# Patient Record
Sex: FEMALE | Race: WHITE | NOT HISPANIC OR LATINO | URBAN - METROPOLITAN AREA
[De-identification: names, ages, dates, MRNs, and addresses within clinical notes are randomized per-mention and may not be internally consistent; named-entity substitution may affect disease eponyms.]

---

## 2023-03-01 PROBLEM — M54.50 LOW BACK PAIN: Status: ACTIVE | Noted: 2023-03-01

## 2023-03-01 PROBLEM — R35.0 URINE FREQUENCY: Status: ACTIVE | Noted: 2023-03-01

## 2023-03-01 PROBLEM — B37.31 CANDIDIASIS OF VAGINA: Status: ACTIVE | Noted: 2023-03-01

## 2023-03-01 PROBLEM — K80.20 CHOLELITHIASIS: Status: ACTIVE | Noted: 2023-03-01

## 2023-03-01 PROBLEM — R74.8 ELEVATED CPK: Status: ACTIVE | Noted: 2023-03-01

## 2023-03-01 PROBLEM — B36.9 FUNGAL RASH OF TRUNK: Status: ACTIVE | Noted: 2023-03-01

## 2023-03-01 PROBLEM — M54.9 BACK PAIN WITH RADIATION: Status: ACTIVE | Noted: 2023-03-01

## 2023-03-01 PROBLEM — D25.9 UTERINE FIBROID: Status: ACTIVE | Noted: 2023-03-01

## 2023-03-01 PROBLEM — E55.9 VITAMIN D DEFICIENCY: Status: ACTIVE | Noted: 2023-03-01

## 2023-03-01 PROBLEM — E03.9 HYPOTHYROIDISM: Status: ACTIVE | Noted: 2023-03-01

## 2023-03-01 PROBLEM — R06.02 SOB (SHORTNESS OF BREATH) ON EXERTION: Status: ACTIVE | Noted: 2023-03-01

## 2023-03-01 PROBLEM — J11.1 INFLUENZA: Status: ACTIVE | Noted: 2023-03-01

## 2023-03-01 PROBLEM — L30.9 DERMATITIS, ECZEMATOID: Status: ACTIVE | Noted: 2023-03-01

## 2023-03-01 PROBLEM — M25.552 HIP PAIN, BILATERAL: Status: ACTIVE | Noted: 2023-03-01

## 2023-03-01 PROBLEM — M89.9 DISORDER OF BONE AND ARTICULAR CARTILAGE: Status: ACTIVE | Noted: 2023-03-01

## 2023-03-01 PROBLEM — M79.10 MYALGIA: Status: ACTIVE | Noted: 2023-03-01

## 2023-03-01 PROBLEM — M94.9 DISORDER OF BONE AND ARTICULAR CARTILAGE: Status: ACTIVE | Noted: 2023-03-01

## 2023-03-01 PROBLEM — R05.9 COUGH: Status: ACTIVE | Noted: 2023-03-01

## 2023-03-01 PROBLEM — R73.9 HYPERGLYCEMIA: Status: ACTIVE | Noted: 2023-03-01

## 2023-03-01 PROBLEM — M54.2 NECK PAIN: Status: ACTIVE | Noted: 2023-03-01

## 2023-03-01 PROBLEM — R06.83 SNORING: Status: ACTIVE | Noted: 2023-03-01

## 2023-03-01 PROBLEM — J32.9 SINUSITIS: Status: ACTIVE | Noted: 2023-03-01

## 2023-03-01 PROBLEM — R53.83 FATIGUE: Status: ACTIVE | Noted: 2023-03-01

## 2023-03-01 PROBLEM — E66.811 CLASS 1 OBESITY WITH BODY MASS INDEX (BMI) OF 30.0 TO 30.9 IN ADULT: Status: ACTIVE | Noted: 2023-03-01

## 2023-03-01 PROBLEM — R42 DIZZINESS: Status: ACTIVE | Noted: 2023-03-01

## 2023-03-01 PROBLEM — R10.9 ABDOMINAL PAIN: Status: ACTIVE | Noted: 2023-03-01

## 2023-03-01 PROBLEM — N95.2 ATROPHY OF VAGINA: Status: ACTIVE | Noted: 2023-03-01

## 2023-03-01 PROBLEM — M25.551 HIP PAIN, BILATERAL: Status: ACTIVE | Noted: 2023-03-01

## 2023-03-01 PROBLEM — E78.5 HYPERLIPIDEMIA: Status: ACTIVE | Noted: 2023-03-01

## 2023-03-01 PROBLEM — E07.9 THYROID LUMP: Status: ACTIVE | Noted: 2023-03-01

## 2023-03-01 PROBLEM — M79.602 PAIN OF LEFT UPPER EXTREMITY: Status: ACTIVE | Noted: 2023-03-01

## 2023-03-01 PROBLEM — E66.9 CLASS 1 OBESITY WITH BODY MASS INDEX (BMI) OF 30.0 TO 30.9 IN ADULT: Status: ACTIVE | Noted: 2023-03-01

## 2023-03-01 PROBLEM — M54.9 BACK PAIN: Status: ACTIVE | Noted: 2023-03-01

## 2023-03-01 PROBLEM — E04.1 THYROID NODULE: Status: ACTIVE | Noted: 2023-03-01

## 2023-03-01 PROBLEM — R09.81 SINUS CONGESTION: Status: ACTIVE | Noted: 2023-03-01

## 2023-03-01 PROBLEM — R68.89 FLU-LIKE SYMPTOMS: Status: ACTIVE | Noted: 2023-03-01

## 2023-03-01 PROBLEM — R10.2 PELVIC PAIN IN FEMALE: Status: ACTIVE | Noted: 2023-03-01

## 2023-03-01 PROBLEM — N39.0 UTI (URINARY TRACT INFECTION): Status: ACTIVE | Noted: 2023-03-01

## 2023-03-01 PROBLEM — J45.909 ASTHMA (HHS-HCC): Status: ACTIVE | Noted: 2023-03-01

## 2023-03-01 PROBLEM — I10 HYPERTENSION: Status: ACTIVE | Noted: 2023-03-01

## 2023-03-01 PROBLEM — J30.9 ALLERGIC RHINITIS: Status: ACTIVE | Noted: 2023-03-01

## 2023-03-01 PROBLEM — U07.1 COVID-19 VIRUS INFECTION: Status: ACTIVE | Noted: 2023-03-01

## 2023-03-01 PROBLEM — B37.0 ORAL THRUSH: Status: ACTIVE | Noted: 2023-03-01

## 2023-03-01 PROBLEM — R07.9 CHEST PAIN: Status: ACTIVE | Noted: 2023-03-01

## 2023-03-01 PROBLEM — E78.00 PURE HYPERCHOLESTEROLEMIA: Status: ACTIVE | Noted: 2023-03-01

## 2023-03-01 PROBLEM — R31.9 HEMATURIA: Status: ACTIVE | Noted: 2023-03-01

## 2023-03-01 PROBLEM — E87.1 HYPONATREMIA: Status: ACTIVE | Noted: 2023-03-01

## 2023-03-01 PROBLEM — R82.998 DARK BROWN-COLORED URINE: Status: ACTIVE | Noted: 2023-03-01

## 2023-03-01 PROBLEM — L85.3 DRY SKIN: Status: ACTIVE | Noted: 2023-03-01

## 2023-03-01 RX ORDER — DESOXIMETASONE 2.5 MG/G
CREAM TOPICAL
COMMUNITY
Start: 2017-12-05 | End: 2023-06-23 | Stop reason: ALTCHOICE

## 2023-03-01 RX ORDER — ALBUTEROL SULFATE 0.83 MG/ML
SOLUTION RESPIRATORY (INHALATION)
COMMUNITY
Start: 2022-03-11 | End: 2023-11-13 | Stop reason: SDUPTHER

## 2023-03-01 RX ORDER — BUDESONIDE AND FORMOTEROL FUMARATE DIHYDRATE 160; 4.5 UG/1; UG/1
2 AEROSOL RESPIRATORY (INHALATION) 2 TIMES DAILY
COMMUNITY
Start: 2022-03-25 | End: 2023-11-13 | Stop reason: SDUPTHER

## 2023-03-01 RX ORDER — LEVOCETIRIZINE DIHYDROCHLORIDE 5 MG/1
1 TABLET, FILM COATED ORAL DAILY
COMMUNITY
Start: 2016-04-18 | End: 2023-05-16

## 2023-03-01 RX ORDER — OLMESARTAN MEDOXOMIL 40 MG/1
1 TABLET ORAL DAILY
COMMUNITY
Start: 2020-10-23 | End: 2023-03-22 | Stop reason: SDUPTHER

## 2023-03-01 RX ORDER — FLUTICASONE PROPIONATE 50 MCG
1 SPRAY, SUSPENSION (ML) NASAL DAILY
COMMUNITY
Start: 2020-09-29 | End: 2023-11-13 | Stop reason: SDUPTHER

## 2023-03-01 RX ORDER — NYSTATIN 100000 U/G
CREAM TOPICAL
COMMUNITY
Start: 2021-07-19 | End: 2023-06-23 | Stop reason: ALTCHOICE

## 2023-03-01 RX ORDER — ALBUTEROL SULFATE 90 UG/1
2 AEROSOL, METERED RESPIRATORY (INHALATION) 2 TIMES DAILY PRN
COMMUNITY
Start: 2022-03-11 | End: 2023-03-28

## 2023-03-01 RX ORDER — LEVOTHYROXINE SODIUM 100 UG/1
1 TABLET ORAL DAILY
COMMUNITY
End: 2023-11-13 | Stop reason: DRUGHIGH

## 2023-03-02 LAB
ALANINE AMINOTRANSFERASE (SGPT) (U/L) IN SER/PLAS: 33 U/L (ref 7–45)
ALBUMIN (G/DL) IN SER/PLAS: 4.4 G/DL (ref 3.4–5)
ALKALINE PHOSPHATASE (U/L) IN SER/PLAS: 87 U/L (ref 33–136)
ANION GAP IN SER/PLAS: 12 MMOL/L (ref 10–20)
ASPARTATE AMINOTRANSFERASE (SGOT) (U/L) IN SER/PLAS: 25 U/L (ref 9–39)
BILIRUBIN TOTAL (MG/DL) IN SER/PLAS: 0.9 MG/DL (ref 0–1.2)
CALCIUM (MG/DL) IN SER/PLAS: 10.4 MG/DL (ref 8.6–10.6)
CARBON DIOXIDE, TOTAL (MMOL/L) IN SER/PLAS: 29 MMOL/L (ref 21–32)
CHLORIDE (MMOL/L) IN SER/PLAS: 99 MMOL/L (ref 98–107)
CHOLESTEROL (MG/DL) IN SER/PLAS: 266 MG/DL (ref 0–199)
CHOLESTEROL IN HDL (MG/DL) IN SER/PLAS: 67.6 MG/DL
CHOLESTEROL/HDL RATIO: 3.9
CREATININE (MG/DL) IN SER/PLAS: 0.71 MG/DL (ref 0.5–1.05)
ERYTHROCYTE DISTRIBUTION WIDTH (RATIO) BY AUTOMATED COUNT: 12.6 % (ref 11.5–14.5)
ERYTHROCYTE MEAN CORPUSCULAR HEMOGLOBIN CONCENTRATION (G/DL) BY AUTOMATED: 32.9 G/DL (ref 32–36)
ERYTHROCYTE MEAN CORPUSCULAR VOLUME (FL) BY AUTOMATED COUNT: 89 FL (ref 80–100)
ERYTHROCYTES (10*6/UL) IN BLOOD BY AUTOMATED COUNT: 4.71 X10E12/L (ref 4–5.2)
GFR FEMALE: >90 ML/MIN/1.73M2
GLUCOSE (MG/DL) IN SER/PLAS: 96 MG/DL (ref 74–99)
HEMATOCRIT (%) IN BLOOD BY AUTOMATED COUNT: 42 % (ref 36–46)
HEMOGLOBIN (G/DL) IN BLOOD: 13.8 G/DL (ref 12–16)
LDL: 177 MG/DL (ref 0–99)
LEUKOCYTES (10*3/UL) IN BLOOD BY AUTOMATED COUNT: 4.1 X10E9/L (ref 4.4–11.3)
NRBC (PER 100 WBCS) BY AUTOMATED COUNT: 0 /100 WBC (ref 0–0)
PLATELETS (10*3/UL) IN BLOOD AUTOMATED COUNT: 293 X10E9/L (ref 150–450)
POTASSIUM (MMOL/L) IN SER/PLAS: 4.3 MMOL/L (ref 3.5–5.3)
PROTEIN TOTAL: 7 G/DL (ref 6.4–8.2)
SODIUM (MMOL/L) IN SER/PLAS: 136 MMOL/L (ref 136–145)
THYROTROPIN (MIU/L) IN SER/PLAS BY DETECTION LIMIT <= 0.05 MIU/L: 6.07 MIU/L (ref 0.44–3.98)
TRIGLYCERIDE (MG/DL) IN SER/PLAS: 108 MG/DL (ref 0–149)
UREA NITROGEN (MG/DL) IN SER/PLAS: 10 MG/DL (ref 6–23)
VLDL: 22 MG/DL (ref 0–40)

## 2023-03-06 ENCOUNTER — OFFICE VISIT (OUTPATIENT)
Dept: PRIMARY CARE | Facility: CLINIC | Age: 68
End: 2023-03-06
Payer: COMMERCIAL

## 2023-03-06 VITALS
HEIGHT: 62 IN | DIASTOLIC BLOOD PRESSURE: 60 MMHG | BODY MASS INDEX: 31.47 KG/M2 | SYSTOLIC BLOOD PRESSURE: 122 MMHG | WEIGHT: 171 LBS

## 2023-03-06 DIAGNOSIS — J30.89 SEASONAL ALLERGIC RHINITIS DUE TO OTHER ALLERGIC TRIGGER: ICD-10-CM

## 2023-03-06 DIAGNOSIS — I15.9 SECONDARY HYPERTENSION: Primary | ICD-10-CM

## 2023-03-06 DIAGNOSIS — J45.20 MILD INTERMITTENT ASTHMA, UNSPECIFIED WHETHER COMPLICATED (HHS-HCC): ICD-10-CM

## 2023-03-06 DIAGNOSIS — Z00.00 HEALTHCARE MAINTENANCE: ICD-10-CM

## 2023-03-06 DIAGNOSIS — E78.2 MIXED HYPERLIPIDEMIA: ICD-10-CM

## 2023-03-06 DIAGNOSIS — E03.9 HYPOTHYROIDISM, UNSPECIFIED TYPE: ICD-10-CM

## 2023-03-06 PROCEDURE — 3078F DIAST BP <80 MM HG: CPT | Performed by: INTERNAL MEDICINE

## 2023-03-06 PROCEDURE — 3074F SYST BP LT 130 MM HG: CPT | Performed by: INTERNAL MEDICINE

## 2023-03-06 PROCEDURE — 99214 OFFICE O/P EST MOD 30 MIN: CPT | Performed by: INTERNAL MEDICINE

## 2023-03-06 PROCEDURE — 1159F MED LIST DOCD IN RCRD: CPT | Performed by: INTERNAL MEDICINE

## 2023-03-06 RX ORDER — FLUTICASONE PROPIONATE 50 MCG
2 SPRAY, SUSPENSION (ML) NASAL DAILY
Qty: 16 G | Refills: 2 | Status: SHIPPED | OUTPATIENT
Start: 2023-03-06 | End: 2023-06-23 | Stop reason: ALTCHOICE

## 2023-03-06 RX ORDER — LEVOTHYROXINE SODIUM 100 UG/1
100 TABLET ORAL DAILY
Qty: 90 TABLET | Refills: 1 | Status: SHIPPED | OUTPATIENT
Start: 2023-03-06 | End: 2023-06-23 | Stop reason: ALTCHOICE

## 2023-03-06 NOTE — PROGRESS NOTES
"Subjective   Patient ID: Ade Fisher is a 67 y.o. female who presents for Follow-up (Results) and Earache.    Earache      Follow-up on hypertension high cholesterol hypothyroidism  Did blood work  She has to go off the statins because it gave her severe muscle pain,  Wants refill on allergy medications  Needs refill on eczema cream  Her asthma is flaring up as she moved to new house and lot of work of remodeling going on refill of nebulizer solution  She feels her ears stuffy    Past recap   Patient presents with complaints of urinary symptoms having some frequency dark urine and back pain over the weekend  She had colonoscopy done couple of months ago by Dr. Chris diagnosed with diverticular disease     She did not do sleep study     Follow-up on hypertension high cholesterol hypothyroidism  Needs medication refill  Did blood work  She feels very tired because she does not get enough sleep  She does snore but does not think she has sleep apnea did not do sleep study  She is off the statins could not tolerate the side effects  She was on vacation and was not watching her diet     Nonsmoker nondrinker  Family history mother  of lung cancer father  of colon cancer at 85 grandmother had stroke    Review of Systems   HENT:  Positive for ear pain.        Objective   /60   Ht 1.575 m (5' 2\")   Wt 77.6 kg (171 lb)   BMI 31.28 kg/m²     Physical Exam  Vitals reviewed.   Constitutional:       Appearance: Normal appearance.   HENT:      Head: Normocephalic and atraumatic.      Right Ear: Tympanic membrane, ear canal and external ear normal.      Left Ear: Tympanic membrane, ear canal and external ear normal.      Nose: Nose normal.      Mouth/Throat:      Pharynx: Oropharynx is clear.   Eyes:      Extraocular Movements: Extraocular movements intact.      Conjunctiva/sclera: Conjunctivae normal.      Pupils: Pupils are equal, round, and reactive to light.   Cardiovascular:      Rate and Rhythm: Normal " rate and regular rhythm.      Pulses: Normal pulses.      Heart sounds: Normal heart sounds.   Pulmonary:      Effort: Pulmonary effort is normal.      Breath sounds: Normal breath sounds.   Abdominal:      General: Abdomen is flat. Bowel sounds are normal.      Palpations: Abdomen is soft.   Musculoskeletal:      Cervical back: Normal range of motion and neck supple.   Skin:     General: Skin is warm and dry.   Neurological:      General: No focal deficit present.      Mental Status: She is alert and oriented to person, place, and time.   Psychiatric:         Mood and Affect: Mood normal.         Assessment/Plan   Problem List Items Addressed This Visit          Respiratory    Asthma       Circulatory    Hypertension - Primary       Endocrine/Metabolic    Hypothyroidism       Other    Allergic rhinitis     Other Visit Diagnoses       Healthcare maintenance              Past recap  Blood pressure is stable  Will decrease levothyroxine 100 mcg  Ultrasound thyroid for the thyroid nodule yearly follow-up  Cholesterol is extremely high but patient does not want to take medications  She understands risks  She is cannot do it with diet and exercise  For hematuria she saw the urologist cystoscopy normal  Patient thinks that years she was going through a lot of stress and anxiety could have been contributing to a lot of her symptoms  Follow-up blood work in 3 months    3/6/23  Patient tympanic membrane clear  She has some tenderness in the TMJ  Flonase nasal spray to help with the eustachian tube dysfunction  Blood work reviewed  TSH little elevated I think patient was missing few dosages which she accepted  We will continue levothyroxine 100 mcg  Patient is again refusing to take statins for high cholesterol  We will do CT cardiac scoring to assess the risk  Follow-up blood work in 6 months

## 2023-03-22 DIAGNOSIS — I10 HYPERTENSION, UNSPECIFIED TYPE: Primary | ICD-10-CM

## 2023-03-23 RX ORDER — OLMESARTAN MEDOXOMIL 40 MG/1
40 TABLET ORAL DAILY
Qty: 90 TABLET | Refills: 0 | Status: SHIPPED | OUTPATIENT
Start: 2023-03-23 | End: 2023-06-05

## 2023-03-24 DIAGNOSIS — Z00.00 HEALTHCARE MAINTENANCE: ICD-10-CM

## 2023-03-27 DIAGNOSIS — J45.20 MILD INTERMITTENT ASTHMA, UNSPECIFIED WHETHER COMPLICATED (HHS-HCC): Primary | ICD-10-CM

## 2023-03-28 RX ORDER — ALBUTEROL SULFATE 90 UG/1
AEROSOL, METERED RESPIRATORY (INHALATION)
Qty: 8.5 G | Refills: 0 | Status: SHIPPED | OUTPATIENT
Start: 2023-03-28 | End: 2023-06-23 | Stop reason: ALTCHOICE

## 2023-05-15 DIAGNOSIS — J30.89 ALLERGIC RHINITIS DUE TO OTHER ALLERGIC TRIGGER, UNSPECIFIED SEASONALITY: Primary | ICD-10-CM

## 2023-05-16 RX ORDER — LEVOCETIRIZINE DIHYDROCHLORIDE 5 MG/1
TABLET, FILM COATED ORAL
Qty: 90 TABLET | Refills: 1 | Status: SHIPPED | OUTPATIENT
Start: 2023-05-16 | End: 2023-11-10

## 2023-05-25 ENCOUNTER — TELEMEDICINE (OUTPATIENT)
Dept: PRIMARY CARE | Facility: CLINIC | Age: 68
End: 2023-05-25
Payer: COMMERCIAL

## 2023-05-25 VITALS — HEIGHT: 62 IN | BODY MASS INDEX: 31.65 KG/M2 | WEIGHT: 172 LBS

## 2023-05-25 DIAGNOSIS — R09.81 SINUS CONGESTION: ICD-10-CM

## 2023-05-25 PROBLEM — J20.9 ACUTE BRONCHITIS: Status: ACTIVE | Noted: 2023-05-25

## 2023-05-25 PROBLEM — J01.90 ACUTE SINUSITIS: Status: ACTIVE | Noted: 2023-05-25

## 2023-05-25 PROBLEM — R10.2 PAIN IN FEMALE PELVIS: Status: ACTIVE | Noted: 2023-05-25

## 2023-05-25 PROCEDURE — 99213 OFFICE O/P EST LOW 20 MIN: CPT | Performed by: INTERNAL MEDICINE

## 2023-05-25 RX ORDER — AZITHROMYCIN 250 MG/1
TABLET, FILM COATED ORAL
Qty: 6 TABLET | Refills: 0 | Status: SHIPPED | OUTPATIENT
Start: 2023-05-25 | End: 2023-05-30

## 2023-05-25 RX ORDER — DOXYCYCLINE 100 MG/1
100 CAPSULE ORAL 2 TIMES DAILY
Qty: 20 CAPSULE | Refills: 0 | COMMUNITY
Start: 2023-05-19 | End: 2023-05-29

## 2023-06-05 DIAGNOSIS — I10 HYPERTENSION, UNSPECIFIED TYPE: ICD-10-CM

## 2023-06-05 RX ORDER — OLMESARTAN MEDOXOMIL 40 MG/1
TABLET ORAL
Qty: 90 TABLET | Refills: 0 | Status: SHIPPED | OUTPATIENT
Start: 2023-06-05 | End: 2023-09-05

## 2023-06-07 ENCOUNTER — TELEMEDICINE (OUTPATIENT)
Dept: PRIMARY CARE | Facility: CLINIC | Age: 68
End: 2023-06-07
Payer: COMMERCIAL

## 2023-06-07 VITALS — BODY MASS INDEX: 31.65 KG/M2 | WEIGHT: 172 LBS | HEIGHT: 62 IN

## 2023-06-07 DIAGNOSIS — R09.81 SINUS CONGESTION: ICD-10-CM

## 2023-06-07 PROCEDURE — 99213 OFFICE O/P EST LOW 20 MIN: CPT | Performed by: INTERNAL MEDICINE

## 2023-06-07 RX ORDER — ALBUTEROL SULFATE 90 UG/1
2 AEROSOL, METERED RESPIRATORY (INHALATION) EVERY 4 HOURS PRN
Qty: 8.5 G | Refills: 0 | Status: SHIPPED | OUTPATIENT
Start: 2023-06-07 | End: 2023-07-10

## 2023-06-07 RX ORDER — AZITHROMYCIN 500 MG/1
500 TABLET, FILM COATED ORAL DAILY
Qty: 10 TABLET | Refills: 0 | Status: SHIPPED | OUTPATIENT
Start: 2023-06-07 | End: 2023-06-23 | Stop reason: ALTCHOICE

## 2023-06-07 RX ORDER — FLUTICASONE PROPIONATE 50 MCG
1 SPRAY, SUSPENSION (ML) NASAL DAILY
Qty: 16 G | Refills: 11 | Status: SHIPPED | OUTPATIENT
Start: 2023-06-07 | End: 2023-10-17 | Stop reason: ALTCHOICE

## 2023-06-07 NOTE — PROGRESS NOTES
"Subjective   Patient ID: Ade Fisher is a 67 y.o. female who presents for virtual visit (Sinus problems).    HPI   Patient complaining of sinus congestion postnasal drainage  She had 2 rounds of antibiotics since May 25.  Treated with doxycycline and Z-Jez.  She could not handle doxycycline  She is again having headaches dripping in the post sinus no chills no fever         Past recap  Follow-up on hypertension high cholesterol hypothyroidism  Did blood work  She has to go off the statins because it gave her severe muscle pain,  Wants refill on allergy medications  Needs refill on eczema cream  Her asthma is flaring up as she moved to new house and lot of work of remodeling going on refill of nebulizer solution  She feels her ears stuffy     Past recap   Patient presents with complaints of urinary symptoms having some frequency dark urine and back pain over the weekend  She had colonoscopy done couple of months ago by Dr. Chris diagnosed with diverticular disease     She did not do sleep study     Follow-up on hypertension high cholesterol hypothyroidism  Needs medication refill  Did blood work  She feels very tired because she does not get enough sleep  She does snore but does not think she has sleep apnea did not do sleep study  She is off the statins could not tolerate the side effects  She was on vacation and was not watching her diet     Nonsmoker nondrinker  Family history mother  of lung cancer father  of colon cancer at 85 grandmother had stroke     Review of Systems    Objective   Ht 1.575 m (5' 2\")   Wt 78 kg (172 lb)   BMI 31.46 kg/m²     Physical Exam    Assessment/Plan   Problem List Items Addressed This Visit          Other    Sinus congestion    Relevant Medications    azithromycin (Zithromax) 500 mg tablet    albuterol (ProAir HFA) 90 mcg/actuation inhaler    fluticasone (Flonase) 50 mcg/actuation nasal spray        Past recap  Blood pressure is stable  Will decrease levothyroxine 100 " mcg  Ultrasound thyroid for the thyroid nodule yearly follow-up  Cholesterol is extremely high but patient does not want to take medications  She understands risks  She is cannot do it with diet and exercise  For hematuria she saw the urologist cystoscopy normal  Patient thinks that years she was going through a lot of stress and anxiety could have been contributing to a lot of her symptoms  Follow-up blood work in 3 months     3/6/23  Patient tympanic membrane clear  She has some tenderness in the TMJ  Flonase nasal spray to help with the eustachian tube dysfunction  Blood work reviewed  TSH little elevated I think patient was missing few dosages which she accepted  We will continue levothyroxine 100 mcg  Patient is again refusing to take statins for high cholesterol  We will do CT cardiac scoring to assess the risk  Follow-up blood work in 6 months    6/7/2023  Treat with Zithromax for 10 days  Flonase nasal spray  Albuterol inhaler  Plenty of fluids  Follow-up if not better  I am wondering if patient has underlying allergies causing the symptoms  Advised to follow-up if not better

## 2023-06-11 NOTE — PROGRESS NOTES
"Subjective   Patient ID: Ade Fisher is a 67 y.o. female who presents for virtual visit (Sinus congestion).    HPI   Patient is here with complaints of having sinus congestion postnasal drainage sore throat headache runny nose taking allergy medication and Flonase not helping went to urgent care prescribed doxycycline made her stomach upset and having diarrhea     Past recap   follow-up on hypertension high cholesterol hypothyroidism  Did blood work  She has to go off the statins because it gave her severe muscle pain,  Wants refill on allergy medications  Needs refill on eczema cream  Her asthma is flaring up as she moved to new house and lot of work of remodeling going on refill of nebulizer solution  She feels her ears stuffy     Past recap   Patient presents with complaints of urinary symptoms having some frequency dark urine and back pain over the weekend  She had colonoscopy done couple of months ago by Dr. Chris diagnosed with diverticular disease     She did not do sleep study     Follow-up on hypertension high cholesterol hypothyroidism  Needs medication refill  Did blood work  She feels very tired because she does not get enough sleep  She does snore but does not think she has sleep apnea did not do sleep study  She is off the statins could not tolerate the side effects  She was on vacation and was not watching her diet     Nonsmoker nondrinker  Family history mother  of lung cancer father  of colon cancer at 85 grandmother had stroke  Review of Systems    Objective   Ht 1.575 m (5' 2\")   Wt 78 kg (172 lb)   BMI 31.46 kg/m²     Physical Exam  On virtual examination patient sounded congested in the sinuses  Assessment/Plan   Problem List Items Addressed This Visit          Other    Sinus congestion     Past recap  Blood pressure is stable  Will decrease levothyroxine 100 mcg  Ultrasound thyroid for the thyroid nodule yearly follow-up  Cholesterol is extremely high but patient does not want to " take medications  She understands risks  She is cannot do it with diet and exercise  For hematuria she saw the urologist cystoscopy normal  Patient thinks that years she was going through a lot of stress and anxiety could have been contributing to a lot of her symptoms  Follow-up blood work in 3 months     3/6/23  Patient tympanic membrane clear  She has some tenderness in the TMJ  Flonase nasal spray to help with the eustachian tube dysfunction  Blood work reviewed  TSH little elevated I think patient was missing few dosages which she accepted  We will continue levothyroxine 100 mcg  Patient is again refusing to take statins for high cholesterol  We will do CT cardiac scoring to assess the risk  Follow-up blood work in 6 months     5/25/2023  Treat with Z-Jez  Steam saline gargles rest fluids  Follow-up if not better

## 2023-06-23 ENCOUNTER — OFFICE VISIT (OUTPATIENT)
Dept: PRIMARY CARE | Facility: CLINIC | Age: 68
End: 2023-06-23
Payer: COMMERCIAL

## 2023-06-23 VITALS
WEIGHT: 172 LBS | SYSTOLIC BLOOD PRESSURE: 116 MMHG | BODY MASS INDEX: 31.65 KG/M2 | HEIGHT: 62 IN | DIASTOLIC BLOOD PRESSURE: 74 MMHG

## 2023-06-23 DIAGNOSIS — J32.9 SINUSITIS, UNSPECIFIED CHRONICITY, UNSPECIFIED LOCATION: Primary | ICD-10-CM

## 2023-06-23 PROCEDURE — 3074F SYST BP LT 130 MM HG: CPT | Performed by: INTERNAL MEDICINE

## 2023-06-23 PROCEDURE — 1036F TOBACCO NON-USER: CPT | Performed by: INTERNAL MEDICINE

## 2023-06-23 PROCEDURE — 3078F DIAST BP <80 MM HG: CPT | Performed by: INTERNAL MEDICINE

## 2023-06-23 PROCEDURE — 1159F MED LIST DOCD IN RCRD: CPT | Performed by: INTERNAL MEDICINE

## 2023-06-23 PROCEDURE — 99213 OFFICE O/P EST LOW 20 MIN: CPT | Performed by: INTERNAL MEDICINE

## 2023-06-23 RX ORDER — AMOXICILLIN AND CLAVULANATE POTASSIUM 875; 125 MG/1; MG/1
875 TABLET, FILM COATED ORAL 2 TIMES DAILY
Qty: 20 TABLET | Refills: 0 | Status: SHIPPED | OUTPATIENT
Start: 2023-06-23 | End: 2023-07-03

## 2023-06-23 ASSESSMENT — ENCOUNTER SYMPTOMS: SINUS COMPLAINT: 1
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
## 2023-07-09 DIAGNOSIS — R09.81 SINUS CONGESTION: ICD-10-CM

## 2023-07-10 RX ORDER — ALBUTEROL SULFATE 90 UG/1
AEROSOL, METERED RESPIRATORY (INHALATION)
Qty: 8.5 G | Refills: 0 | Status: SHIPPED | OUTPATIENT
Start: 2023-07-10 | End: 2023-11-13 | Stop reason: SDUPTHER

## 2023-08-15 ENCOUNTER — OFFICE VISIT (OUTPATIENT)
Dept: PRIMARY CARE | Facility: CLINIC | Age: 68
End: 2023-08-15
Payer: COMMERCIAL

## 2023-08-15 VITALS
DIASTOLIC BLOOD PRESSURE: 60 MMHG | WEIGHT: 172 LBS | BODY MASS INDEX: 31.65 KG/M2 | SYSTOLIC BLOOD PRESSURE: 116 MMHG | HEIGHT: 62 IN

## 2023-08-15 DIAGNOSIS — K21.9 GASTROESOPHAGEAL REFLUX DISEASE WITHOUT ESOPHAGITIS: ICD-10-CM

## 2023-08-15 DIAGNOSIS — J01.91 ACUTE RECURRENT SINUSITIS, UNSPECIFIED LOCATION: ICD-10-CM

## 2023-08-15 DIAGNOSIS — R05.9 COUGH, UNSPECIFIED TYPE: ICD-10-CM

## 2023-08-15 PROCEDURE — 3078F DIAST BP <80 MM HG: CPT | Performed by: INTERNAL MEDICINE

## 2023-08-15 PROCEDURE — 1159F MED LIST DOCD IN RCRD: CPT | Performed by: INTERNAL MEDICINE

## 2023-08-15 PROCEDURE — 99213 OFFICE O/P EST LOW 20 MIN: CPT | Performed by: INTERNAL MEDICINE

## 2023-08-15 PROCEDURE — 1036F TOBACCO NON-USER: CPT | Performed by: INTERNAL MEDICINE

## 2023-08-15 PROCEDURE — 1126F AMNT PAIN NOTED NONE PRSNT: CPT | Performed by: INTERNAL MEDICINE

## 2023-08-15 PROCEDURE — 3074F SYST BP LT 130 MM HG: CPT | Performed by: INTERNAL MEDICINE

## 2023-08-15 RX ORDER — PANTOPRAZOLE SODIUM 40 MG/1
40 TABLET, DELAYED RELEASE ORAL DAILY
Qty: 30 TABLET | Refills: 2 | Status: SHIPPED | OUTPATIENT
Start: 2023-08-15 | End: 2023-11-13 | Stop reason: SDUPTHER

## 2023-08-15 RX ORDER — METHYLPREDNISOLONE 4 MG/1
TABLET ORAL
Qty: 21 TABLET | Refills: 0 | Status: SHIPPED | OUTPATIENT
Start: 2023-08-15 | End: 2023-10-17 | Stop reason: ALTCHOICE

## 2023-08-15 RX ORDER — AZITHROMYCIN 250 MG/1
TABLET, FILM COATED ORAL
Qty: 6 TABLET | Refills: 0 | Status: SHIPPED | OUTPATIENT
Start: 2023-08-15 | End: 2023-08-20

## 2023-08-15 ASSESSMENT — ENCOUNTER SYMPTOMS
COUGH: 1
DEPRESSION: 0
OCCASIONAL FEELINGS OF UNSTEADINESS: 0
LOSS OF SENSATION IN FEET: 0
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
## 2023-09-04 DIAGNOSIS — I10 HYPERTENSION, UNSPECIFIED TYPE: ICD-10-CM

## 2023-09-05 RX ORDER — OLMESARTAN MEDOXOMIL 40 MG/1
TABLET ORAL
Qty: 90 TABLET | Refills: 0 | Status: SHIPPED | OUTPATIENT
Start: 2023-09-05 | End: 2023-11-13 | Stop reason: SDUPTHER

## 2023-09-14 PROBLEM — N88.2 STRICTURE AND STENOSIS OF CERVIX UTERI: Status: ACTIVE | Noted: 2023-09-14

## 2023-09-14 PROBLEM — N94.10 DYSPAREUNIA IN FEMALE: Status: ACTIVE | Noted: 2023-09-14

## 2023-09-14 RX ORDER — OXYMETAZOLINE HCL 0.05 %
SPRAY, NON-AEROSOL (ML) NASAL
COMMUNITY
Start: 2023-07-29 | End: 2023-10-17 | Stop reason: ALTCHOICE

## 2023-09-14 RX ORDER — OLMESARTAN MEDOXOMIL AND HYDROCHLOROTHIAZIDE 20/12.5 20; 12.5 MG/1; MG/1
TABLET ORAL EVERY 24 HOURS
COMMUNITY
End: 2023-10-17 | Stop reason: ALTCHOICE

## 2023-10-05 ENCOUNTER — LAB (OUTPATIENT)
Dept: LAB | Facility: LAB | Age: 68
End: 2023-10-05
Payer: COMMERCIAL

## 2023-10-05 DIAGNOSIS — I15.9 SECONDARY HYPERTENSION: ICD-10-CM

## 2023-10-05 DIAGNOSIS — J01.91 ACUTE RECURRENT SINUSITIS, UNSPECIFIED LOCATION: ICD-10-CM

## 2023-10-05 DIAGNOSIS — R05.9 COUGH, UNSPECIFIED TYPE: ICD-10-CM

## 2023-10-05 LAB
ALBUMIN SERPL BCP-MCNC: 4.6 G/DL (ref 3.4–5)
ALP SERPL-CCNC: 104 U/L (ref 33–136)
ALT SERPL W P-5'-P-CCNC: 42 U/L (ref 7–45)
ANION GAP SERPL CALC-SCNC: 12 MMOL/L (ref 10–20)
AST SERPL W P-5'-P-CCNC: 43 U/L (ref 9–39)
BILIRUB SERPL-MCNC: 0.5 MG/DL (ref 0–1.2)
BUN SERPL-MCNC: 14 MG/DL (ref 6–23)
CALCIUM SERPL-MCNC: 9.8 MG/DL (ref 8.6–10.6)
CHLORIDE SERPL-SCNC: 103 MMOL/L (ref 98–107)
CHOLEST SERPL-MCNC: 265 MG/DL (ref 0–199)
CHOLESTEROL/HDL RATIO: 4.3
CO2 SERPL-SCNC: 28 MMOL/L (ref 21–32)
CREAT SERPL-MCNC: 0.63 MG/DL (ref 0.5–1.05)
ERYTHROCYTE [DISTWIDTH] IN BLOOD BY AUTOMATED COUNT: 12.8 % (ref 11.5–14.5)
GFR SERPL CREATININE-BSD FRML MDRD: >90 ML/MIN/1.73M*2
GLUCOSE SERPL-MCNC: 92 MG/DL (ref 74–99)
HCT VFR BLD AUTO: 41.5 % (ref 36–46)
HDLC SERPL-MCNC: 61.4 MG/DL
HGB BLD-MCNC: 13.6 G/DL (ref 12–16)
LDLC SERPL CALC-MCNC: 183 MG/DL (ref 140–190)
MCH RBC QN AUTO: 29.3 PG (ref 26–34)
MCHC RBC AUTO-ENTMCNC: 32.8 G/DL (ref 32–36)
MCV RBC AUTO: 89 FL (ref 80–100)
NON HDL CHOLESTEROL: 204 MG/DL (ref 0–149)
NRBC BLD-RTO: 0 /100 WBCS (ref 0–0)
PLATELET # BLD AUTO: 285 X10*3/UL (ref 150–450)
PMV BLD AUTO: 9.6 FL (ref 7.5–11.5)
POTASSIUM SERPL-SCNC: 4.9 MMOL/L (ref 3.5–5.3)
PROT SERPL-MCNC: 7 G/DL (ref 6.4–8.2)
RBC # BLD AUTO: 4.64 X10*6/UL (ref 4–5.2)
SODIUM SERPL-SCNC: 138 MMOL/L (ref 136–145)
TRIGL SERPL-MCNC: 105 MG/DL (ref 0–149)
TSH SERPL-ACNC: 8.64 MIU/L (ref 0.44–3.98)
VLDL: 21 MG/DL (ref 0–40)
WBC # BLD AUTO: 4 X10*3/UL (ref 4.4–11.3)

## 2023-10-05 PROCEDURE — 80053 COMPREHEN METABOLIC PANEL: CPT

## 2023-10-05 PROCEDURE — 84443 ASSAY THYROID STIM HORMONE: CPT

## 2023-10-05 PROCEDURE — 85027 COMPLETE CBC AUTOMATED: CPT

## 2023-10-05 PROCEDURE — 36415 COLL VENOUS BLD VENIPUNCTURE: CPT

## 2023-10-05 PROCEDURE — 80061 LIPID PANEL: CPT

## 2023-10-07 ENCOUNTER — TELEPHONE (OUTPATIENT)
Dept: PRIMARY CARE | Facility: CLINIC | Age: 68
End: 2023-10-07
Payer: COMMERCIAL

## 2023-10-17 ENCOUNTER — OFFICE VISIT (OUTPATIENT)
Dept: OTOLARYNGOLOGY | Facility: CLINIC | Age: 68
End: 2023-10-17
Payer: COMMERCIAL

## 2023-10-17 VITALS — BODY MASS INDEX: 31.72 KG/M2 | HEIGHT: 61 IN | WEIGHT: 168 LBS

## 2023-10-17 DIAGNOSIS — J32.9 RECURRENT SINUSITIS: Primary | ICD-10-CM

## 2023-10-17 DIAGNOSIS — R13.19 ESOPHAGEAL DYSPHAGIA: ICD-10-CM

## 2023-10-17 PROCEDURE — 99203 OFFICE O/P NEW LOW 30 MIN: CPT | Performed by: OTOLARYNGOLOGY

## 2023-10-17 PROCEDURE — 1126F AMNT PAIN NOTED NONE PRSNT: CPT | Performed by: OTOLARYNGOLOGY

## 2023-10-17 PROCEDURE — 1036F TOBACCO NON-USER: CPT | Performed by: OTOLARYNGOLOGY

## 2023-10-17 PROCEDURE — 1159F MED LIST DOCD IN RCRD: CPT | Performed by: OTOLARYNGOLOGY

## 2023-10-17 PROCEDURE — 1160F RVW MEDS BY RX/DR IN RCRD: CPT | Performed by: OTOLARYNGOLOGY

## 2023-10-17 NOTE — PROGRESS NOTES
Chief Complaint   Patient presents with    SINUS ISSUES     LOV 2017 W/ BOLD- SINUS ISSUES/SWALLOWING ISSUES      HPI:  Ade Fisher is a 68 y.o. female who complains of having a bad summer with some recurrent sinus infections.  Required 4 rounds of antibiotics.  She uses Flonase and saline spray.  Denies any significant clinical classic allergy symptoms.  Feeling better now without any complaints.  She does have a history over the past few years of some difficulty swallowing on occasion where she gets some pain in the chest and things feeling to get stuck.    PMH:  Past Medical History:   Diagnosis Date    Pain in right hip 12/02/2021    Hip pain, bilateral    Personal history of other diseases of the respiratory system     Personal history of asthma    Personal history of other endocrine, nutritional and metabolic disease     History of hyperlipidemia    Personal history of other specified conditions 03/30/2017    History of snoring    Personal history of urinary (tract) infections 01/18/2022    History of urinary tract infection     Past Surgical History:   Procedure Laterality Date    OTHER SURGICAL HISTORY  09/23/2013    Wrist Surgery    TONSILLECTOMY  04/12/2013    Tonsillectomy         Medications:     Current Outpatient Medications:     albuterol 2.5 mg /3 mL (0.083 %) nebulizer solution, USE 1 UNIT DOSE EVERY 4-6 HOURS AS NEEDED FOR WHEEZING ., Disp: , Rfl:     albuterol 90 mcg/actuation inhaler, INHALE 2 PUFFS BY MOUTH EVERY 4 HOURS IF NEEDED FOR WHEEZING OR SHORTNESS OF BREATH, Disp: 8.5 g, Rfl: 0    budesonide-formoteroL (Symbicort) 160-4.5 mcg/actuation inhaler, Inhale 2 puffs twice a day. RINSE MOUTH AFTER USE., Disp: , Rfl:     fluticasone (Flonase) 50 mcg/actuation nasal spray, Administer 1 spray into each nostril once daily., Disp: , Rfl:     levocetirizine (Xyzal) 5 mg tablet, TAKE 1 TABLET DAILY, Disp: 90 tablet, Rfl: 1    levothyroxine (Synthroid, Levoxyl) 100 mcg tablet, Take 1 tablet (100  "mcg) by mouth once daily., Disp: , Rfl:     olmesartan (BENIcar) 40 mg tablet, TAKE 1 TABLET DAILY, Disp: 90 tablet, Rfl: 0    pantoprazole (ProtoNix) 40 mg EC tablet, Take 1 tablet (40 mg) by mouth once daily. Do not crush, chew, or split., Disp: 30 tablet, Rfl: 2     Allergies:  Allergies   Allergen Reactions    Sulfa (Sulfonamide Antibiotics) Unknown        ROS:  Review of systems normal unless stated otherwise in the HPI and/or PMH.    Physical Exam:  Height 1.549 m (5' 1\"), weight 76.2 kg (168 lb). Body mass index is 31.74 kg/m².     GENERAL APPEARANCE: Well developed and well nourished.  Alert and oriented in no acute distress.  Normal vocal quality.      HEAD/FACE: No erythema or edema or facial tenderness.  Normal facial nerve function bilaterally.    EAR:       EXTERNAL: Normal pinnas and external auditory canals without lesion or obstructing wax.       MIDDLE EAR: Tympanic membranes intact and mobile with normal landmarks.  Middle ear space appears well aerated.       TUBE STATUS: N/A       MASTOID CAVITY: N/A       HEARING: Gross hearing assessment is within normal limits.      NOSE:       VISUALIZED USING: Anterior rhinoscopy with headlight and nasal speculum.       DORSUM: Midline, nontraumatic appearance.       MUCOSA: Normal-appearing.       SECRETIONS: Normal.       SEPTUM: Midline and nonobstructing.       INFERIOR TURBINATES: Normal.       MIDDLE TURBINATES/MEATUS: N/A       BLEEDING: N/A         ORAL CAVITY/PHARYNX:       TEETH: Adequate dentition.       TONGUE: No mass or lesion.  Normal mobility.       FLOOR OF MOUTH: No mass or lesion.       PALATE: Normal hard palate, soft palate, and uvula.       OROPHARYNX: Normal without mass or lesion.       BUCCAL MUCOSA/GBS: Normal without mass or lesion.       LIPS: Normal.    LARYNX/HYPOPHARYNX/NASOPHARYNX: N/A    NECK: No palpable masses or abnormal adenopathy.  Trachea is midline.    THYROID: No thyromegaly or palpable nodule.    SALIVARY GLANDS: " Normal bilateral parotid and submandibular glands by inspection and palpation.    TMJ's: Normal.    NEURO: Cranial nerve exam grossly normal bilaterally.       Assessment/Plan   Ade was seen today for sinus issues.  Diagnoses and all orders for this visit:  Recurrent sinusitis (Primary)  Esophageal dysphagia     Fortunately her sinuses are feeling better.  Recommend avoidance measures of allergens and soot.  Use saline spray and Leroy pot daily.  Continue use of Flonase.  Having some esophageal dysphagia.  Recommend she see Dr. Chris from GI for evaluation and possible EGD.  She is on a PPI from her PCP which she will continue.  Follow up if symptoms worsen or fail to improve.     Tesfaye Mak MD

## 2023-10-19 DIAGNOSIS — J30.1 ALLERGIC RHINITIS DUE TO POLLEN, UNSPECIFIED SEASONALITY: Primary | ICD-10-CM

## 2023-10-19 RX ORDER — FLUTICASONE PROPIONATE 0.5 MG/G
1 CREAM TOPICAL 2 TIMES DAILY
Qty: 60 G | Refills: 1 | Status: SHIPPED | OUTPATIENT
Start: 2023-10-19

## 2023-10-24 ENCOUNTER — TELEPHONE (OUTPATIENT)
Dept: OBSTETRICS AND GYNECOLOGY | Facility: CLINIC | Age: 68
End: 2023-10-24
Payer: COMMERCIAL

## 2023-10-24 NOTE — TELEPHONE ENCOUNTER
Est pt last seen 11/22/2022  post op / Annual sched for 09/10/2024 / pt calling states that she is having vag itching and irritation / denies discharge at this time / Advised Diflucan 150 mg / advised can take 48-72 hours for relief / if no relief to call office for appt /Diflucan 150 mg 1 po qty 1 no refills called to verified [facility].

## 2023-11-01 ENCOUNTER — ANCILLARY PROCEDURE (OUTPATIENT)
Dept: RADIOLOGY | Facility: CLINIC | Age: 68
End: 2023-11-01
Payer: COMMERCIAL

## 2023-11-01 DIAGNOSIS — Z00.00 ENCOUNTER FOR GENERAL ADULT MEDICAL EXAMINATION WITHOUT ABNORMAL FINDINGS: ICD-10-CM

## 2023-11-01 PROCEDURE — 75571 CT HRT W/O DYE W/CA TEST: CPT

## 2023-11-10 DIAGNOSIS — J30.89 ALLERGIC RHINITIS DUE TO OTHER ALLERGIC TRIGGER, UNSPECIFIED SEASONALITY: ICD-10-CM

## 2023-11-10 RX ORDER — LEVOCETIRIZINE DIHYDROCHLORIDE 5 MG/1
TABLET, FILM COATED ORAL
Qty: 90 TABLET | Refills: 3 | Status: SHIPPED | OUTPATIENT
Start: 2023-11-10 | End: 2023-11-13 | Stop reason: SDUPTHER

## 2023-11-13 ENCOUNTER — OFFICE VISIT (OUTPATIENT)
Dept: PRIMARY CARE | Facility: CLINIC | Age: 68
End: 2023-11-13
Payer: COMMERCIAL

## 2023-11-13 VITALS
WEIGHT: 168 LBS | SYSTOLIC BLOOD PRESSURE: 124 MMHG | DIASTOLIC BLOOD PRESSURE: 76 MMHG | BODY MASS INDEX: 31.72 KG/M2 | HEIGHT: 61 IN

## 2023-11-13 DIAGNOSIS — E03.8 OTHER SPECIFIED HYPOTHYROIDISM: ICD-10-CM

## 2023-11-13 DIAGNOSIS — R09.81 SINUS CONGESTION: ICD-10-CM

## 2023-11-13 DIAGNOSIS — J30.89 ALLERGIC RHINITIS DUE TO OTHER ALLERGIC TRIGGER, UNSPECIFIED SEASONALITY: ICD-10-CM

## 2023-11-13 DIAGNOSIS — E78.2 MIXED HYPERLIPIDEMIA: ICD-10-CM

## 2023-11-13 DIAGNOSIS — K21.9 GASTROESOPHAGEAL REFLUX DISEASE WITHOUT ESOPHAGITIS: ICD-10-CM

## 2023-11-13 DIAGNOSIS — R05.1 ACUTE COUGH: ICD-10-CM

## 2023-11-13 DIAGNOSIS — R79.89 ELEVATED LFTS: ICD-10-CM

## 2023-11-13 DIAGNOSIS — I10 PRIMARY HYPERTENSION: ICD-10-CM

## 2023-11-13 PROCEDURE — 1159F MED LIST DOCD IN RCRD: CPT | Performed by: INTERNAL MEDICINE

## 2023-11-13 PROCEDURE — 99214 OFFICE O/P EST MOD 30 MIN: CPT | Performed by: INTERNAL MEDICINE

## 2023-11-13 PROCEDURE — 1036F TOBACCO NON-USER: CPT | Performed by: INTERNAL MEDICINE

## 2023-11-13 PROCEDURE — 1126F AMNT PAIN NOTED NONE PRSNT: CPT | Performed by: INTERNAL MEDICINE

## 2023-11-13 PROCEDURE — 1160F RVW MEDS BY RX/DR IN RCRD: CPT | Performed by: INTERNAL MEDICINE

## 2023-11-13 PROCEDURE — 3078F DIAST BP <80 MM HG: CPT | Performed by: INTERNAL MEDICINE

## 2023-11-13 PROCEDURE — 3074F SYST BP LT 130 MM HG: CPT | Performed by: INTERNAL MEDICINE

## 2023-11-13 RX ORDER — BUDESONIDE AND FORMOTEROL FUMARATE DIHYDRATE 160; 4.5 UG/1; UG/1
2 AEROSOL RESPIRATORY (INHALATION)
Qty: 4 EACH | Refills: 1 | Status: SHIPPED | OUTPATIENT
Start: 2023-11-13 | End: 2024-02-02 | Stop reason: SDUPTHER

## 2023-11-13 RX ORDER — ALBUTEROL SULFATE 90 UG/1
2 AEROSOL, METERED RESPIRATORY (INHALATION) EVERY 4 HOURS PRN
Qty: 8.5 G | Refills: 2 | Status: SHIPPED | OUTPATIENT
Start: 2023-11-13 | End: 2023-12-19 | Stop reason: SDUPTHER

## 2023-11-13 RX ORDER — OLMESARTAN MEDOXOMIL 40 MG/1
40 TABLET ORAL DAILY
Qty: 90 TABLET | Refills: 0 | Status: SHIPPED | OUTPATIENT
Start: 2023-11-13 | End: 2024-02-09 | Stop reason: HOSPADM

## 2023-11-13 RX ORDER — LEVOCETIRIZINE DIHYDROCHLORIDE 5 MG/1
5 TABLET, FILM COATED ORAL DAILY
Qty: 90 TABLET | Refills: 0 | Status: SHIPPED | OUTPATIENT
Start: 2023-11-13

## 2023-11-13 RX ORDER — PANTOPRAZOLE SODIUM 40 MG/1
40 TABLET, DELAYED RELEASE ORAL DAILY
Qty: 90 TABLET | Refills: 0 | Status: SHIPPED | OUTPATIENT
Start: 2023-11-13 | End: 2025-03-01

## 2023-11-13 RX ORDER — ALBUTEROL SULFATE 0.83 MG/ML
2.5 SOLUTION RESPIRATORY (INHALATION) 4 TIMES DAILY
Qty: 2400 ML | Refills: 0 | Status: SHIPPED | OUTPATIENT
Start: 2023-11-13

## 2023-11-13 RX ORDER — FLUTICASONE PROPIONATE 50 MCG
1 SPRAY, SUSPENSION (ML) NASAL DAILY
Qty: 16 G | Refills: 1 | Status: SHIPPED | OUTPATIENT
Start: 2023-11-13

## 2023-11-13 RX ORDER — LEVOTHYROXINE SODIUM 125 UG/1
125 TABLET ORAL DAILY
Qty: 90 TABLET | Refills: 0 | Status: SHIPPED | OUTPATIENT
Start: 2023-11-13 | End: 2024-11-12
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
## 2023-12-19 ENCOUNTER — TELEMEDICINE (OUTPATIENT)
Dept: PRIMARY CARE | Facility: CLINIC | Age: 68
End: 2023-12-19
Payer: COMMERCIAL

## 2023-12-19 DIAGNOSIS — R09.81 SINUS CONGESTION: ICD-10-CM

## 2023-12-19 PROCEDURE — 99213 OFFICE O/P EST LOW 20 MIN: CPT | Performed by: INTERNAL MEDICINE

## 2023-12-19 RX ORDER — AZITHROMYCIN 500 MG/1
500 TABLET, FILM COATED ORAL DAILY
Qty: 10 TABLET | Refills: 0 | Status: SHIPPED | OUTPATIENT
Start: 2023-12-19 | End: 2023-12-29

## 2023-12-19 RX ORDER — ALBUTEROL SULFATE 90 UG/1
2 AEROSOL, METERED RESPIRATORY (INHALATION) EVERY 4 HOURS PRN
Qty: 18 G | Refills: 0 | Status: SHIPPED | OUTPATIENT
Start: 2023-12-19 | End: 2024-02-02 | Stop reason: SDUPTHER

## 2023-12-19 ASSESSMENT — ENCOUNTER SYMPTOMS
DEPRESSION: 0
LOSS OF SENSATION IN FEET: 0
OCCASIONAL FEELINGS OF UNSTEADINESS: 0
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
## 2024-01-09 ENCOUNTER — LAB (OUTPATIENT)
Dept: LAB | Facility: LAB | Age: 69
End: 2024-01-09
Payer: COMMERCIAL

## 2024-01-09 DIAGNOSIS — E03.8 OTHER SPECIFIED HYPOTHYROIDISM: ICD-10-CM

## 2024-01-09 DIAGNOSIS — E78.2 MIXED HYPERLIPIDEMIA: ICD-10-CM

## 2024-01-09 DIAGNOSIS — I10 PRIMARY HYPERTENSION: ICD-10-CM

## 2024-01-09 LAB
ALBUMIN SERPL BCP-MCNC: 4.4 G/DL (ref 3.4–5)
ALP SERPL-CCNC: 87 U/L (ref 33–136)
ALT SERPL W P-5'-P-CCNC: 30 U/L (ref 7–45)
ANION GAP SERPL CALC-SCNC: 10 MMOL/L (ref 10–20)
AST SERPL W P-5'-P-CCNC: 26 U/L (ref 9–39)
BILIRUB SERPL-MCNC: 0.7 MG/DL (ref 0–1.2)
BUN SERPL-MCNC: 8 MG/DL (ref 6–23)
CALCIUM SERPL-MCNC: 10.4 MG/DL (ref 8.6–10.6)
CHLORIDE SERPL-SCNC: 99 MMOL/L (ref 98–107)
CHOLEST SERPL-MCNC: 300 MG/DL (ref 0–199)
CHOLESTEROL/HDL RATIO: 4.6
CO2 SERPL-SCNC: 31 MMOL/L (ref 21–32)
CREAT SERPL-MCNC: 0.71 MG/DL (ref 0.5–1.05)
EGFRCR SERPLBLD CKD-EPI 2021: >90 ML/MIN/1.73M*2
ERYTHROCYTE [DISTWIDTH] IN BLOOD BY AUTOMATED COUNT: 12.4 % (ref 11.5–14.5)
GLUCOSE SERPL-MCNC: 87 MG/DL (ref 74–99)
HCT VFR BLD AUTO: 41.2 % (ref 36–46)
HDLC SERPL-MCNC: 65.4 MG/DL
HGB BLD-MCNC: 13.8 G/DL (ref 12–16)
LDLC SERPL CALC-MCNC: 206 MG/DL
MCH RBC QN AUTO: 29.6 PG (ref 26–34)
MCHC RBC AUTO-ENTMCNC: 33.5 G/DL (ref 32–36)
MCV RBC AUTO: 88 FL (ref 80–100)
NON HDL CHOLESTEROL: 235 MG/DL (ref 0–149)
NRBC BLD-RTO: 0 /100 WBCS (ref 0–0)
PLATELET # BLD AUTO: 296 X10*3/UL (ref 150–450)
POTASSIUM SERPL-SCNC: 5.1 MMOL/L (ref 3.5–5.3)
PROT SERPL-MCNC: 7.2 G/DL (ref 6.4–8.2)
RBC # BLD AUTO: 4.66 X10*6/UL (ref 4–5.2)
SODIUM SERPL-SCNC: 135 MMOL/L (ref 136–145)
TRIGL SERPL-MCNC: 145 MG/DL (ref 0–149)
TSH SERPL-ACNC: 3.91 MIU/L (ref 0.44–3.98)
VLDL: 29 MG/DL (ref 0–40)
WBC # BLD AUTO: 3.5 X10*3/UL (ref 4.4–11.3)

## 2024-01-09 PROCEDURE — 36415 COLL VENOUS BLD VENIPUNCTURE: CPT

## 2024-01-09 PROCEDURE — 84443 ASSAY THYROID STIM HORMONE: CPT

## 2024-01-09 PROCEDURE — 80053 COMPREHEN METABOLIC PANEL: CPT

## 2024-01-09 PROCEDURE — 85027 COMPLETE CBC AUTOMATED: CPT

## 2024-01-09 PROCEDURE — 80061 LIPID PANEL: CPT

## 2024-01-11 ENCOUNTER — OFFICE VISIT (OUTPATIENT)
Dept: PRIMARY CARE | Facility: CLINIC | Age: 69
End: 2024-01-11
Payer: COMMERCIAL

## 2024-01-11 ENCOUNTER — LAB (OUTPATIENT)
Dept: LAB | Facility: LAB | Age: 69
End: 2024-01-11
Payer: COMMERCIAL

## 2024-01-11 VITALS
WEIGHT: 174 LBS | SYSTOLIC BLOOD PRESSURE: 144 MMHG | BODY MASS INDEX: 32.85 KG/M2 | HEIGHT: 61 IN | DIASTOLIC BLOOD PRESSURE: 86 MMHG

## 2024-01-11 DIAGNOSIS — R07.9 CHEST PAIN, UNSPECIFIED TYPE: ICD-10-CM

## 2024-01-11 DIAGNOSIS — I10 PRIMARY HYPERTENSION: ICD-10-CM

## 2024-01-11 DIAGNOSIS — R07.9 CHEST PAIN, UNSPECIFIED TYPE: Primary | ICD-10-CM

## 2024-01-11 DIAGNOSIS — E78.00 PURE HYPERCHOLESTEROLEMIA: ICD-10-CM

## 2024-01-11 DIAGNOSIS — E03.9 HYPOTHYROIDISM, UNSPECIFIED TYPE: ICD-10-CM

## 2024-01-11 LAB
CARDIAC TROPONIN I PNL SERPL HS: 6 NG/L (ref 0–34)
CK SERPL-CCNC: 117 U/L (ref 0–215)

## 2024-01-11 PROCEDURE — 99214 OFFICE O/P EST MOD 30 MIN: CPT | Performed by: INTERNAL MEDICINE

## 2024-01-11 PROCEDURE — 84484 ASSAY OF TROPONIN QUANT: CPT

## 2024-01-11 PROCEDURE — 36415 COLL VENOUS BLD VENIPUNCTURE: CPT

## 2024-01-11 PROCEDURE — 82550 ASSAY OF CK (CPK): CPT

## 2024-01-11 PROCEDURE — 1126F AMNT PAIN NOTED NONE PRSNT: CPT | Performed by: INTERNAL MEDICINE

## 2024-01-11 PROCEDURE — 1159F MED LIST DOCD IN RCRD: CPT | Performed by: INTERNAL MEDICINE

## 2024-01-11 PROCEDURE — 3079F DIAST BP 80-89 MM HG: CPT | Performed by: INTERNAL MEDICINE

## 2024-01-11 PROCEDURE — 3077F SYST BP >= 140 MM HG: CPT | Performed by: INTERNAL MEDICINE

## 2024-01-11 PROCEDURE — 1036F TOBACCO NON-USER: CPT | Performed by: INTERNAL MEDICINE

## 2024-01-11 PROCEDURE — 1160F RVW MEDS BY RX/DR IN RCRD: CPT | Performed by: INTERNAL MEDICINE

## 2024-01-11 PROCEDURE — 93000 ELECTROCARDIOGRAM COMPLETE: CPT | Performed by: INTERNAL MEDICINE

## 2024-01-11 NOTE — PROGRESS NOTES
"Subjective   Patient ID: Ade Fisher is a 68 y.o. female who presents for Follow-up.    Patient is here for follow-up hypertension high cholesterol hypothyroidism  Not taking statin  Follow-up on hypothyroidism  Did blood work  Over the weekend having fleeting chest pains a lot of stress not sleeping heart was beating weird      Past recap        Follow-up on hypertension high cholesterol hypothyroidism  Did blood work  She has to go off the statins because it gave her severe muscle pain,  Wants refill on allergy medications  Needs refill on eczema cream  Her asthma is flaring up as she moved to new house and lot of work of remodeling going on refill of nebulizer solution  She feels her ears stuffy      Patient presents with complaints of urinary symptoms having some frequency dark urine and back pain over the weekend  She had colonoscopy done couple of months ago by Dr. Chris diagnosed with diverticular disease     She did not do sleep study     Follow-up on hypertension high cholesterol hypothyroidism  Needs medication refill  Did blood work  She feels very tired because she does not get enough sleep  She does snore but does not think she has sleep apnea did not do sleep study  She is off the statins could not tolerate the side effects  She was on vacation and was not watching her diet     Nonsmoker nondrinker  Family history mother  of lung cancer father  of colon cancer at 85 grandmother had stroke        Objective   /86   Ht 1.549 m (5' 1\")   Wt 78.9 kg (174 lb)   BMI 32.88 kg/m²     Physical Exam  Vitals reviewed.   Constitutional:       Appearance: Normal appearance.   HENT:      Head: Normocephalic and atraumatic.      Right Ear: Tympanic membrane, ear canal and external ear normal.      Left Ear: Tympanic membrane, ear canal and external ear normal.      Nose: Nose normal.      Mouth/Throat:      Pharynx: Oropharynx is clear.   Eyes:      Extraocular Movements: Extraocular movements " intact.      Conjunctiva/sclera: Conjunctivae normal.      Pupils: Pupils are equal, round, and reactive to light.   Cardiovascular:      Rate and Rhythm: Normal rate and regular rhythm.      Pulses: Normal pulses.      Heart sounds: Normal heart sounds.   Pulmonary:      Effort: Pulmonary effort is normal.      Breath sounds: Normal breath sounds.   Abdominal:      General: Abdomen is flat. Bowel sounds are normal.      Palpations: Abdomen is soft.   Musculoskeletal:      Cervical back: Normal range of motion and neck supple.   Skin:     General: Skin is warm and dry.   Neurological:      General: No focal deficit present.      Mental Status: She is alert and oriented to person, place, and time.   Psychiatric:         Mood and Affect: Mood normal.         Assessment/Plan   Problem List Items Addressed This Visit          Cardiac and Vasculature    Chest pain - Primary    Relevant Orders    CK (Completed)    Troponin I, High Sensitivity (Completed)    ECG 12 Lead    Hypertension    Pure hypercholesterolemia       Endocrine/Metabolic    Hypothyroidism     Past recap  Blood pressure is stable  Will decrease levothyroxine 100 mcg  Ultrasound thyroid for the thyroid nodule yearly follow-up  Cholesterol is extremely high but patient does not want to take medications  She understands risks  She is cannot do it with diet and exercise  For hematuria she saw the urologist cystoscopy normal  Patient thinks that years she was going through a lot of stress and anxiety could have been contributing to a lot of her symptoms  Follow-up blood work in 3 months     3/6/23  Patient tympanic membrane clear  She has some tenderness in the TMJ  Flonase nasal spray to help with the eustachian tube dysfunction  Blood work reviewed  TSH little elevated I think patient was missing few dosages which she accepted  We will continue levothyroxine 100 mcg  Patient is again refusing to take statins for high cholesterol  We will do CT cardiac  scoring to assess the risk  Follow-up blood work in 6 months     6/7/2023  Treat with Zithromax for 10 days  Flonase nasal spray  Albuterol inhaler  Plenty of fluids  Follow-up if not better  I am wondering if patient has underlying allergies causing the symptoms  Advised to follow-up if not better     6/23/2023  I am currently not convinced if patient has sinus infection  I think patient is having rhinitis and allergies  Advised to use Flonase twice a day  Use Claritin-D  If not better then only try antibiotic  Also offered patient to do CAT scan of the sinuses but she wants to wait    8/15/2023    Patient has mild eustachian tube dysfunction on the right side  Sinuses passages clean  I am wondering if patient has acid reflux causing chest congestion  Treat with Protonix Z-Jez and Medrol Dosepak  We will do CT of the sinuses for recurrent sinusitis  Keep her follow-up with the ENT    11/13/2023  Blood work reviewed  TSH elevated at 8.64  Increase levothyroxine 125 mcg  Cholesterol is high   Patient is intolerant to statins and does not want to take it  She will do with diet and exercise  Cholesterol diet chart given  LFT still elevated  Again emphasized low-carb diet  Follow-up blood work in 3 months    1/11/2024  EKG done shows normal sinus rhythm  Troponin ordered  Blood work reviewed all in normal range  Continue current medications  Advised to go to the emergency room if chest pain happens again  Stress test done in 2019 was normal  Patient has not done CT cardiac scoring advised to do  Discussed diet exercise and lifestyle modification to

## 2024-01-31 ENCOUNTER — TELEMEDICINE (OUTPATIENT)
Dept: PRIMARY CARE | Facility: CLINIC | Age: 69
End: 2024-01-31
Payer: COMMERCIAL

## 2024-01-31 DIAGNOSIS — U07.1 COVID-19 VIRUS INFECTION: ICD-10-CM

## 2024-01-31 PROCEDURE — 99213 OFFICE O/P EST LOW 20 MIN: CPT | Performed by: INTERNAL MEDICINE

## 2024-02-01 NOTE — PROGRESS NOTES
Subjective   Patient ID: Ade Fisher is a 68 y.o. female who presents for virtual visit (Covid positive).    Patient presents with complaints of head congestion chest congestion sinus congestion fever, body ache.  Has been tested positive for COVID on  last night she tested positive for COVID     Past recap   patient is here for follow-up hypertension high cholesterol hypothyroidism  Not taking statin  Follow-up on hypothyroidism  Did blood work  Over the weekend having fleeting chest pains a lot of stress not sleeping heart was beating weird      Follow-up on hypertension high cholesterol hypothyroidism  Did blood work  She has to go off the statins because it gave her severe muscle pain,  Wants refill on allergy medications  Needs refill on eczema cream  Her asthma is flaring up as she moved to new house and lot of work of remodeling going on refill of nebulizer solution  She feels her ears stuffy      Patient presents with complaints of urinary symptoms having some frequency dark urine and back pain over the weekend  She had colonoscopy done couple of months ago by Dr. Chris diagnosed with diverticular disease     She did not do sleep study     Follow-up on hypertension high cholesterol hypothyroidism  Needs medication refill  Did blood work  She feels very tired because she does not get enough sleep  She does snore but does not think she has sleep apnea did not do sleep study  She is off the statins could not tolerate the side effects  She was on vacation and was not watching her diet     Nonsmoker nondrinker  Family history mother  of lung cancer father  of colon cancer at 85 grandmother had stroke        Objective   There were no vitals taken for this visit.        Assessment/Plan   Problem List Items Addressed This Visit          Infectious Diseases    COVID-19 virus infection    Relevant Medications    nirmatrelvir-ritonavir (PAXLOVID) 300 mg (150 mg x 2)-100 mg tablet therapy pack     Past  recap  Blood pressure is stable  Will decrease levothyroxine 100 mcg  Ultrasound thyroid for the thyroid nodule yearly follow-up  Cholesterol is extremely high but patient does not want to take medications  She understands risks  She is cannot do it with diet and exercise  For hematuria she saw the urologist cystoscopy normal  Patient thinks that years she was going through a lot of stress and anxiety could have been contributing to a lot of her symptoms  Follow-up blood work in 3 months     3/6/23  Patient tympanic membrane clear  She has some tenderness in the TMJ  Flonase nasal spray to help with the eustachian tube dysfunction  Blood work reviewed  TSH little elevated I think patient was missing few dosages which she accepted  We will continue levothyroxine 100 mcg  Patient is again refusing to take statins for high cholesterol  We will do CT cardiac scoring to assess the risk  Follow-up blood work in 6 months     6/7/2023  Treat with Zithromax for 10 days  Flonase nasal spray  Albuterol inhaler  Plenty of fluids  Follow-up if not better  I am wondering if patient has underlying allergies causing the symptoms  Advised to follow-up if not better     6/23/2023  I am currently not convinced if patient has sinus infection  I think patient is having rhinitis and allergies  Advised to use Flonase twice a day  Use Claritin-D  If not better then only try antibiotic  Also offered patient to do CAT scan of the sinuses but she wants to wait    8/15/2023    Patient has mild eustachian tube dysfunction on the right side  Sinuses passages clean  I am wondering if patient has acid reflux causing chest congestion  Treat with Protonix Z-Jez and Medrol Dosepak  We will do CT of the sinuses for recurrent sinusitis  Keep her follow-up with the ENT    11/13/2023  Blood work reviewed  TSH elevated at 8.64  Increase levothyroxine 125 mcg  Cholesterol is high   Patient is intolerant to statins and does not want to take it  She  will do with diet and exercise  Cholesterol diet chart given  LFT still elevated  Again emphasized low-carb diet  Follow-up blood work in 3 months    1/11/2024  EKG done shows normal sinus rhythm  Troponin ordered  Blood work reviewed all in normal range  Continue current medications  Advised to go to the emergency room if chest pain happens again  Stress test done in 2019 was normal  Patient has not done CT cardiac scoring advised to do  Discussed diet exercise and lifestyle modification to    1/31/2024  Treat with Paxlovid  Rest fluids  Follow-up if not better

## 2024-02-02 ENCOUNTER — TELEMEDICINE (OUTPATIENT)
Dept: PRIMARY CARE | Facility: CLINIC | Age: 69
End: 2024-02-02
Payer: COMMERCIAL

## 2024-02-02 DIAGNOSIS — R05.1 ACUTE COUGH: ICD-10-CM

## 2024-02-02 DIAGNOSIS — U07.1 COVID-19: ICD-10-CM

## 2024-02-02 DIAGNOSIS — R09.81 SINUS CONGESTION: ICD-10-CM

## 2024-02-02 PROCEDURE — 99213 OFFICE O/P EST LOW 20 MIN: CPT | Performed by: INTERNAL MEDICINE

## 2024-02-02 RX ORDER — DEXAMETHASONE 6 MG/1
6 TABLET ORAL DAILY
Qty: 10 TABLET | Refills: 0 | Status: SHIPPED | OUTPATIENT
Start: 2024-02-02 | End: 2024-03-01 | Stop reason: WASHOUT

## 2024-02-02 RX ORDER — ALBUTEROL SULFATE 90 UG/1
2 AEROSOL, METERED RESPIRATORY (INHALATION) EVERY 4 HOURS PRN
Qty: 18 G | Refills: 0 | Status: SHIPPED | OUTPATIENT
Start: 2024-02-02

## 2024-02-02 RX ORDER — BENZONATATE 200 MG/1
200 CAPSULE ORAL 3 TIMES DAILY PRN
Qty: 60 CAPSULE | Refills: 0 | Status: CANCELLED | OUTPATIENT
Start: 2024-02-02 | End: 2024-07-31

## 2024-02-02 RX ORDER — FLUTICASONE PROPIONATE 50 MCG
1 SPRAY, SUSPENSION (ML) NASAL DAILY
Qty: 16 G | Refills: 0 | Status: CANCELLED | OUTPATIENT
Start: 2024-02-02

## 2024-02-02 RX ORDER — BUDESONIDE AND FORMOTEROL FUMARATE DIHYDRATE 160; 4.5 UG/1; UG/1
2 AEROSOL RESPIRATORY (INHALATION)
Qty: 10.2 G | Refills: 0 | Status: SHIPPED | OUTPATIENT
Start: 2024-02-02

## 2024-02-04 NOTE — PROGRESS NOTES
Subjective   Patient ID: Ade Fisher is a 68 y.o. female who presents for virtual visit.    Patient was with COVID symptoms she is getting worse again having headache sinus congestion.  Her  is feeling better.  Patient is having more chest congestion     Past recap   patient presents with complaints of head congestion chest congestion sinus congestion fever, body ache.  Has been tested positive for COVID on  last night she tested positive for COVID     patient is here for follow-up hypertension high cholesterol hypothyroidism  Not taking statin  Follow-up on hypothyroidism  Did blood work  Over the weekend having fleeting chest pains a lot of stress not sleeping heart was beating weird      Follow-up on hypertension high cholesterol hypothyroidism  Did blood work  She has to go off the statins because it gave her severe muscle pain,  Wants refill on allergy medications  Needs refill on eczema cream  Her asthma is flaring up as she moved to new house and lot of work of remodeling going on refill of nebulizer solution  She feels her ears stuffy      Patient presents with complaints of urinary symptoms having some frequency dark urine and back pain over the weekend  She had colonoscopy done couple of months ago by Dr. Chris diagnosed with diverticular disease     She did not do sleep study     Follow-up on hypertension high cholesterol hypothyroidism  Needs medication refill  Did blood work  She feels very tired because she does not get enough sleep  She does snore but does not think she has sleep apnea did not do sleep study  She is off the statins could not tolerate the side effects  She was on vacation and was not watching her diet     Nonsmoker nondrinker  Family history mother  of lung cancer father  of colon cancer at 85 grandmother had stroke        Objective   There were no vitals taken for this visit.        Assessment/Plan   Problem List Items Addressed This Visit          ENT     Sinus congestion    Relevant Medications    albuterol 90 mcg/actuation inhaler       Pulmonary and Pneumonias    Cough    Relevant Medications    budesonide-formoteroL (Symbicort) 160-4.5 mcg/actuation inhaler     Other Visit Diagnoses       COVID-19        Relevant Medications    budesonide-formoteroL (Symbicort) 160-4.5 mcg/actuation inhaler    dexAMETHasone (Decadron) 6 mg tablet    albuterol 90 mcg/actuation inhaler          Past recap  Blood pressure is stable  Will decrease levothyroxine 100 mcg  Ultrasound thyroid for the thyroid nodule yearly follow-up  Cholesterol is extremely high but patient does not want to take medications  She understands risks  She is cannot do it with diet and exercise  For hematuria she saw the urologist cystoscopy normal  Patient thinks that years she was going through a lot of stress and anxiety could have been contributing to a lot of her symptoms  Follow-up blood work in 3 months     3/6/23  Patient tympanic membrane clear  She has some tenderness in the TMJ  Flonase nasal spray to help with the eustachian tube dysfunction  Blood work reviewed  TSH little elevated I think patient was missing few dosages which she accepted  We will continue levothyroxine 100 mcg  Patient is again refusing to take statins for high cholesterol  We will do CT cardiac scoring to assess the risk  Follow-up blood work in 6 months     6/7/2023  Treat with Zithromax for 10 days  Flonase nasal spray  Albuterol inhaler  Plenty of fluids  Follow-up if not better  I am wondering if patient has underlying allergies causing the symptoms  Advised to follow-up if not better     6/23/2023  I am currently not convinced if patient has sinus infection  I think patient is having rhinitis and allergies  Advised to use Flonase twice a day  Use Claritin-D  If not better then only try antibiotic  Also offered patient to do CAT scan of the sinuses but she wants to wait    8/15/2023    Patient has mild eustachian tube  dysfunction on the right side  Sinuses passages clean  I am wondering if patient has acid reflux causing chest congestion  Treat with Protonix Z-Jez and Medrol Dosepak  We will do CT of the sinuses for recurrent sinusitis  Keep her follow-up with the ENT    11/13/2023  Blood work reviewed  TSH elevated at 8.64  Increase levothyroxine 125 mcg  Cholesterol is high   Patient is intolerant to statins and does not want to take it  She will do with diet and exercise  Cholesterol diet chart given  LFT still elevated  Again emphasized low-carb diet  Follow-up blood work in 3 months    1/11/2024  EKG done shows normal sinus rhythm  Troponin ordered  Blood work reviewed all in normal range  Continue current medications  Advised to go to the emergency room if chest pain happens again  Stress test done in 2019 was normal  Patient has not done CT cardiac scoring advised to do  Discussed diet exercise and lifestyle modification to    1/31/2024  Treat with Paxlovid  Rest fluids  Follow-up if not better    2/2/2024  Decadron 6 mg once a day for 10 days Symbicort and albuterol inhaler  Follow-up in person if not better.  Go to ER if gets worse

## 2024-02-05 ENCOUNTER — APPOINTMENT (OUTPATIENT)
Dept: CARDIOLOGY | Facility: HOSPITAL | Age: 69
DRG: 643 | End: 2024-02-05
Payer: COMMERCIAL

## 2024-02-05 ENCOUNTER — APPOINTMENT (OUTPATIENT)
Dept: RADIOLOGY | Facility: HOSPITAL | Age: 69
DRG: 643 | End: 2024-02-05
Payer: COMMERCIAL

## 2024-02-05 ENCOUNTER — HOSPITAL ENCOUNTER (INPATIENT)
Facility: HOSPITAL | Age: 69
LOS: 4 days | Discharge: HOME | DRG: 643 | End: 2024-02-09
Attending: STUDENT IN AN ORGANIZED HEALTH CARE EDUCATION/TRAINING PROGRAM | Admitting: INTERNAL MEDICINE
Payer: COMMERCIAL

## 2024-02-05 DIAGNOSIS — R09.81 SINUS CONGESTION: ICD-10-CM

## 2024-02-05 DIAGNOSIS — U07.1 COVID-19: Primary | ICD-10-CM

## 2024-02-05 DIAGNOSIS — E87.1 HYPONATREMIA: ICD-10-CM

## 2024-02-05 DIAGNOSIS — R06.02 SHORTNESS OF BREATH: ICD-10-CM

## 2024-02-05 DIAGNOSIS — J32.9 SINUSITIS, UNSPECIFIED CHRONICITY, UNSPECIFIED LOCATION: ICD-10-CM

## 2024-02-05 LAB
ALBUMIN SERPL-MCNC: 4.3 G/DL (ref 3.5–5)
ALP BLD-CCNC: 102 U/L (ref 35–125)
ALT SERPL-CCNC: 25 U/L (ref 5–40)
ANION GAP SERPL CALC-SCNC: 10 MMOL/L
ANION GAP SERPL CALC-SCNC: 13 MMOL/L
APPEARANCE UR: CLEAR
APPEARANCE UR: CLEAR
AST SERPL-CCNC: 25 U/L (ref 5–40)
BASOPHILS # BLD AUTO: 0 X10*3/UL (ref 0–0.1)
BASOPHILS NFR BLD AUTO: 0 %
BILIRUB SERPL-MCNC: 0.7 MG/DL (ref 0.1–1.2)
BILIRUB UR STRIP.AUTO-MCNC: NEGATIVE MG/DL
BILIRUB UR STRIP.AUTO-MCNC: NEGATIVE MG/DL
BUN SERPL-MCNC: 6 MG/DL (ref 8–25)
BUN SERPL-MCNC: 6 MG/DL (ref 8–25)
CALCIUM SERPL-MCNC: 9.1 MG/DL (ref 8.5–10.4)
CALCIUM SERPL-MCNC: 9.9 MG/DL (ref 8.5–10.4)
CHLORIDE SERPL-SCNC: 86 MMOL/L (ref 97–107)
CHLORIDE SERPL-SCNC: 89 MMOL/L (ref 97–107)
CO2 SERPL-SCNC: 23 MMOL/L (ref 24–31)
CO2 SERPL-SCNC: 23 MMOL/L (ref 24–31)
COLOR UR: COLORLESS
COLOR UR: COLORLESS
CREAT SERPL-MCNC: 0.6 MG/DL (ref 0.4–1.6)
CREAT SERPL-MCNC: 0.6 MG/DL (ref 0.4–1.6)
EGFRCR SERPLBLD CKD-EPI 2021: >90 ML/MIN/1.73M*2
EGFRCR SERPLBLD CKD-EPI 2021: >90 ML/MIN/1.73M*2
EOSINOPHIL # BLD AUTO: 0 X10*3/UL (ref 0–0.7)
EOSINOPHIL NFR BLD AUTO: 0 %
ERYTHROCYTE [DISTWIDTH] IN BLOOD BY AUTOMATED COUNT: 12.1 % (ref 11.5–14.5)
FLUAV RNA RESP QL NAA+PROBE: NOT DETECTED
FLUBV RNA RESP QL NAA+PROBE: NOT DETECTED
GLUCOSE SERPL-MCNC: 104 MG/DL (ref 65–99)
GLUCOSE SERPL-MCNC: 121 MG/DL (ref 65–99)
GLUCOSE UR STRIP.AUTO-MCNC: NORMAL MG/DL
GLUCOSE UR STRIP.AUTO-MCNC: NORMAL MG/DL
HCT VFR BLD AUTO: 39.8 % (ref 36–46)
HGB BLD-MCNC: 14.1 G/DL (ref 12–16)
IMM GRANULOCYTES # BLD AUTO: 0.02 X10*3/UL (ref 0–0.7)
IMM GRANULOCYTES NFR BLD AUTO: 0.4 % (ref 0–0.9)
KETONES UR STRIP.AUTO-MCNC: ABNORMAL MG/DL
KETONES UR STRIP.AUTO-MCNC: ABNORMAL MG/DL
LEUKOCYTE ESTERASE UR QL STRIP.AUTO: NEGATIVE
LEUKOCYTE ESTERASE UR QL STRIP.AUTO: NEGATIVE
LYMPHOCYTES # BLD AUTO: 0.8 X10*3/UL (ref 1.2–4.8)
LYMPHOCYTES NFR BLD AUTO: 16 %
MAGNESIUM SERPL-MCNC: 1.8 MG/DL (ref 1.6–3.1)
MCH RBC QN AUTO: 29.6 PG (ref 26–34)
MCHC RBC AUTO-ENTMCNC: 35.4 G/DL (ref 32–36)
MCV RBC AUTO: 83 FL (ref 80–100)
MONOCYTES # BLD AUTO: 0.32 X10*3/UL (ref 0.1–1)
MONOCYTES NFR BLD AUTO: 6.4 %
NEUTROPHILS # BLD AUTO: 3.87 X10*3/UL (ref 1.2–7.7)
NEUTROPHILS NFR BLD AUTO: 77.2 %
NITRITE UR QL STRIP.AUTO: NEGATIVE
NITRITE UR QL STRIP.AUTO: NEGATIVE
NRBC BLD-RTO: 0 /100 WBCS (ref 0–0)
PH UR STRIP.AUTO: 7 [PH]
PH UR STRIP.AUTO: 7 [PH]
PLATELET # BLD AUTO: 306 X10*3/UL (ref 150–450)
POTASSIUM SERPL-SCNC: 3.8 MMOL/L (ref 3.4–5.1)
POTASSIUM SERPL-SCNC: 4.1 MMOL/L (ref 3.4–5.1)
PROT SERPL-MCNC: 7.4 G/DL (ref 5.9–7.9)
PROT UR STRIP.AUTO-MCNC: NEGATIVE MG/DL
PROT UR STRIP.AUTO-MCNC: NEGATIVE MG/DL
RBC # BLD AUTO: 4.77 X10*6/UL (ref 4–5.2)
RBC # UR STRIP.AUTO: NEGATIVE /UL
RBC # UR STRIP.AUTO: NEGATIVE /UL
SARS-COV-2 RNA RESP QL NAA+PROBE: DETECTED
SODIUM SERPL-SCNC: 122 MMOL/L (ref 133–145)
SODIUM SERPL-SCNC: 122 MMOL/L (ref 133–145)
SP GR UR STRIP.AUTO: 1
SP GR UR STRIP.AUTO: 1.01
T4 FREE SERPL-MCNC: 1.5 NG/DL (ref 0.9–1.7)
TSH SERPL DL<=0.05 MIU/L-ACNC: 2.47 MIU/L (ref 0.27–4.2)
UROBILINOGEN UR STRIP.AUTO-MCNC: NORMAL MG/DL
UROBILINOGEN UR STRIP.AUTO-MCNC: NORMAL MG/DL
WBC # BLD AUTO: 5 X10*3/UL (ref 4.4–11.3)

## 2024-02-05 PROCEDURE — 84439 ASSAY OF FREE THYROXINE: CPT | Performed by: INTERNAL MEDICINE

## 2024-02-05 PROCEDURE — 80061 LIPID PANEL: CPT | Performed by: INTERNAL MEDICINE

## 2024-02-05 PROCEDURE — 84443 ASSAY THYROID STIM HORMONE: CPT | Performed by: INTERNAL MEDICINE

## 2024-02-05 PROCEDURE — 81003 URINALYSIS AUTO W/O SCOPE: CPT | Performed by: INTERNAL MEDICINE

## 2024-02-05 PROCEDURE — 93005 ELECTROCARDIOGRAM TRACING: CPT

## 2024-02-05 PROCEDURE — 82570 ASSAY OF URINE CREATININE: CPT | Performed by: INTERNAL MEDICINE

## 2024-02-05 PROCEDURE — 2500000004 HC RX 250 GENERAL PHARMACY W/ HCPCS (ALT 636 FOR OP/ED): Performed by: INTERNAL MEDICINE

## 2024-02-05 PROCEDURE — 99285 EMERGENCY DEPT VISIT HI MDM: CPT | Mod: 25 | Performed by: STUDENT IN AN ORGANIZED HEALTH CARE EDUCATION/TRAINING PROGRAM

## 2024-02-05 PROCEDURE — 84075 ASSAY ALKALINE PHOSPHATASE: CPT

## 2024-02-05 PROCEDURE — 83930 ASSAY OF BLOOD OSMOLALITY: CPT | Mod: TRILAB,WESLAB | Performed by: INTERNAL MEDICINE

## 2024-02-05 PROCEDURE — 87636 SARSCOV2 & INF A&B AMP PRB: CPT

## 2024-02-05 PROCEDURE — 96374 THER/PROPH/DIAG INJ IV PUSH: CPT

## 2024-02-05 PROCEDURE — 80048 BASIC METABOLIC PNL TOTAL CA: CPT | Mod: CCI | Performed by: INTERNAL MEDICINE

## 2024-02-05 PROCEDURE — 36415 COLL VENOUS BLD VENIPUNCTURE: CPT

## 2024-02-05 PROCEDURE — 83735 ASSAY OF MAGNESIUM: CPT | Performed by: INTERNAL MEDICINE

## 2024-02-05 PROCEDURE — 1200000002 HC GENERAL ROOM WITH TELEMETRY DAILY

## 2024-02-05 PROCEDURE — 2500000004 HC RX 250 GENERAL PHARMACY W/ HCPCS (ALT 636 FOR OP/ED)

## 2024-02-05 PROCEDURE — 36415 COLL VENOUS BLD VENIPUNCTURE: CPT | Performed by: INTERNAL MEDICINE

## 2024-02-05 PROCEDURE — 81003 URINALYSIS AUTO W/O SCOPE: CPT

## 2024-02-05 PROCEDURE — 83935 ASSAY OF URINE OSMOLALITY: CPT | Mod: TRILAB,WESLAB | Performed by: INTERNAL MEDICINE

## 2024-02-05 PROCEDURE — 71046 X-RAY EXAM CHEST 2 VIEWS: CPT

## 2024-02-05 PROCEDURE — 85025 COMPLETE CBC W/AUTO DIFF WBC: CPT

## 2024-02-05 RX ORDER — FLUTICASONE FUROATE AND VILANTEROL 200; 25 UG/1; UG/1
1 POWDER RESPIRATORY (INHALATION)
Status: DISCONTINUED | OUTPATIENT
Start: 2024-02-06 | End: 2024-02-09 | Stop reason: HOSPADM

## 2024-02-05 RX ORDER — HEPARIN SODIUM 5000 [USP'U]/ML
5000 INJECTION, SOLUTION INTRAVENOUS; SUBCUTANEOUS EVERY 8 HOURS SCHEDULED
Status: DISCONTINUED | OUTPATIENT
Start: 2024-02-05 | End: 2024-02-09 | Stop reason: HOSPADM

## 2024-02-05 RX ORDER — ACETAMINOPHEN 325 MG/1
650 TABLET ORAL ONCE
Status: COMPLETED | OUTPATIENT
Start: 2024-02-05 | End: 2024-02-05

## 2024-02-05 RX ORDER — LORATADINE 10 MG/1
10 TABLET ORAL DAILY
Status: DISCONTINUED | OUTPATIENT
Start: 2024-02-06 | End: 2024-02-09 | Stop reason: HOSPADM

## 2024-02-05 RX ORDER — PANTOPRAZOLE SODIUM 40 MG/1
40 TABLET, DELAYED RELEASE ORAL DAILY
Status: DISCONTINUED | OUTPATIENT
Start: 2024-02-06 | End: 2024-02-09 | Stop reason: HOSPADM

## 2024-02-05 RX ORDER — ACETAMINOPHEN 650 MG/1
650 SUPPOSITORY RECTAL EVERY 4 HOURS PRN
Status: DISCONTINUED | OUTPATIENT
Start: 2024-02-05 | End: 2024-02-09 | Stop reason: HOSPADM

## 2024-02-05 RX ORDER — ALBUTEROL SULFATE 0.83 MG/ML
2.5 SOLUTION RESPIRATORY (INHALATION) 4 TIMES DAILY
Status: DISCONTINUED | OUTPATIENT
Start: 2024-02-05 | End: 2024-02-06

## 2024-02-05 RX ORDER — LEVOTHYROXINE SODIUM 125 UG/1
125 TABLET ORAL
Status: DISCONTINUED | OUTPATIENT
Start: 2024-02-06 | End: 2024-02-09 | Stop reason: HOSPADM

## 2024-02-05 RX ORDER — GUAIFENESIN/DEXTROMETHORPHAN 100-10MG/5
5 SYRUP ORAL EVERY 4 HOURS PRN
Status: DISCONTINUED | OUTPATIENT
Start: 2024-02-05 | End: 2024-02-09 | Stop reason: HOSPADM

## 2024-02-05 RX ORDER — ACETAMINOPHEN 160 MG/5ML
650 SOLUTION ORAL EVERY 4 HOURS PRN
Status: DISCONTINUED | OUTPATIENT
Start: 2024-02-05 | End: 2024-02-09 | Stop reason: HOSPADM

## 2024-02-05 RX ORDER — ONDANSETRON HYDROCHLORIDE 2 MG/ML
4 INJECTION, SOLUTION INTRAVENOUS ONCE
Status: COMPLETED | OUTPATIENT
Start: 2024-02-05 | End: 2024-02-05

## 2024-02-05 RX ORDER — ALBUTEROL SULFATE 0.83 MG/ML
2.5 SOLUTION RESPIRATORY (INHALATION) EVERY 4 HOURS PRN
Status: DISCONTINUED | OUTPATIENT
Start: 2024-02-05 | End: 2024-02-09 | Stop reason: HOSPADM

## 2024-02-05 RX ORDER — ONDANSETRON HYDROCHLORIDE 2 MG/ML
4 INJECTION, SOLUTION INTRAVENOUS EVERY 6 HOURS PRN
Status: DISCONTINUED | OUTPATIENT
Start: 2024-02-05 | End: 2024-02-09 | Stop reason: HOSPADM

## 2024-02-05 RX ORDER — ACETAMINOPHEN 325 MG/1
650 TABLET ORAL EVERY 6 HOURS PRN
Status: DISCONTINUED | OUTPATIENT
Start: 2024-02-05 | End: 2024-02-05

## 2024-02-05 RX ORDER — TRIAMCINOLONE ACETONIDE 1 MG/G
CREAM TOPICAL 2 TIMES DAILY
Status: DISCONTINUED | OUTPATIENT
Start: 2024-02-05 | End: 2024-02-09 | Stop reason: HOSPADM

## 2024-02-05 RX ORDER — GUAIFENESIN 600 MG/1
600 TABLET, EXTENDED RELEASE ORAL EVERY 12 HOURS PRN
Status: DISCONTINUED | OUTPATIENT
Start: 2024-02-05 | End: 2024-02-09 | Stop reason: HOSPADM

## 2024-02-05 RX ORDER — DEXAMETHASONE 4 MG/1
6 TABLET ORAL DAILY
Status: DISCONTINUED | OUTPATIENT
Start: 2024-02-06 | End: 2024-02-09 | Stop reason: HOSPADM

## 2024-02-05 RX ORDER — FLUTICASONE PROPIONATE 50 MCG
1 SPRAY, SUSPENSION (ML) NASAL DAILY
Status: DISCONTINUED | OUTPATIENT
Start: 2024-02-06 | End: 2024-02-09 | Stop reason: HOSPADM

## 2024-02-05 RX ORDER — ACETAMINOPHEN 325 MG/1
650 TABLET ORAL EVERY 4 HOURS PRN
Status: DISCONTINUED | OUTPATIENT
Start: 2024-02-05 | End: 2024-02-09 | Stop reason: HOSPADM

## 2024-02-05 RX ORDER — POLYETHYLENE GLYCOL 3350 17 G/17G
17 POWDER, FOR SOLUTION ORAL DAILY PRN
Status: DISCONTINUED | OUTPATIENT
Start: 2024-02-05 | End: 2024-02-09 | Stop reason: HOSPADM

## 2024-02-05 RX ADMIN — ONDANSETRON 4 MG: 2 INJECTION INTRAMUSCULAR; INTRAVENOUS at 15:53

## 2024-02-05 RX ADMIN — ONDANSETRON HYDROCHLORIDE 4 MG: 2 INJECTION INTRAMUSCULAR; INTRAVENOUS at 18:45

## 2024-02-05 RX ADMIN — ACETAMINOPHEN 325 MG: 325 TABLET ORAL at 15:52

## 2024-02-05 RX ADMIN — ACETAMINOPHEN 650 MG: 325 TABLET ORAL at 21:26

## 2024-02-05 RX ADMIN — SODIUM CHLORIDE 1000 ML: 9 INJECTION, SOLUTION INTRAVENOUS at 15:40

## 2024-02-05 RX ADMIN — HEPARIN SODIUM 5000 UNITS: 5000 INJECTION, SOLUTION INTRAVENOUS; SUBCUTANEOUS at 23:03

## 2024-02-05 RX ADMIN — ONDANSETRON 4 MG: 2 INJECTION INTRAMUSCULAR; INTRAVENOUS at 23:03

## 2024-02-05 SDOH — SOCIAL STABILITY: SOCIAL INSECURITY: DOES ANYONE TRY TO KEEP YOU FROM HAVING/CONTACTING OTHER FRIENDS OR DOING THINGS OUTSIDE YOUR HOME?: NO

## 2024-02-05 SDOH — SOCIAL STABILITY: SOCIAL INSECURITY: ABUSE: ADULT

## 2024-02-05 SDOH — SOCIAL STABILITY: SOCIAL INSECURITY: HAVE YOU HAD THOUGHTS OF HARMING ANYONE ELSE?: NO

## 2024-02-05 SDOH — SOCIAL STABILITY: SOCIAL INSECURITY: DO YOU FEEL ANYONE HAS EXPLOITED OR TAKEN ADVANTAGE OF YOU FINANCIALLY OR OF YOUR PERSONAL PROPERTY?: NO

## 2024-02-05 SDOH — SOCIAL STABILITY: SOCIAL INSECURITY: DO YOU FEEL UNSAFE GOING BACK TO THE PLACE WHERE YOU ARE LIVING?: NO

## 2024-02-05 SDOH — SOCIAL STABILITY: SOCIAL INSECURITY: ARE YOU OR HAVE YOU BEEN THREATENED OR ABUSED PHYSICALLY, EMOTIONALLY, OR SEXUALLY BY ANYONE?: NO

## 2024-02-05 SDOH — SOCIAL STABILITY: SOCIAL INSECURITY: WERE YOU ABLE TO COMPLETE ALL THE BEHAVIORAL HEALTH SCREENINGS?: YES

## 2024-02-05 SDOH — SOCIAL STABILITY: SOCIAL INSECURITY: ARE THERE ANY APPARENT SIGNS OF INJURIES/BEHAVIORS THAT COULD BE RELATED TO ABUSE/NEGLECT?: NO

## 2024-02-05 SDOH — SOCIAL STABILITY: SOCIAL INSECURITY: HAS ANYONE EVER THREATENED TO HURT YOUR FAMILY OR YOUR PETS?: NO

## 2024-02-05 ASSESSMENT — COGNITIVE AND FUNCTIONAL STATUS - GENERAL
DRESSING REGULAR UPPER BODY CLOTHING: A LITTLE
TOILETING: A LITTLE
PATIENT BASELINE BEDBOUND: NO
MOBILITY SCORE: 22
CLIMB 3 TO 5 STEPS WITH RAILING: A LITTLE
DAILY ACTIVITIY SCORE: 22
WALKING IN HOSPITAL ROOM: A LITTLE

## 2024-02-05 ASSESSMENT — COLUMBIA-SUICIDE SEVERITY RATING SCALE - C-SSRS
1. IN THE PAST MONTH, HAVE YOU WISHED YOU WERE DEAD OR WISHED YOU COULD GO TO SLEEP AND NOT WAKE UP?: NO
6. HAVE YOU EVER DONE ANYTHING, STARTED TO DO ANYTHING, OR PREPARED TO DO ANYTHING TO END YOUR LIFE?: NO
2. HAVE YOU ACTUALLY HAD ANY THOUGHTS OF KILLING YOURSELF?: NO

## 2024-02-05 ASSESSMENT — ACTIVITIES OF DAILY LIVING (ADL)
HEARING - LEFT EAR: FUNCTIONAL
JUDGMENT_ADEQUATE_SAFELY_COMPLETE_DAILY_ACTIVITIES: YES
DRESSING YOURSELF: INDEPENDENT
GROOMING: INDEPENDENT
ADEQUATE_TO_COMPLETE_ADL: YES
FEEDING YOURSELF: INDEPENDENT
BATHING: INDEPENDENT
WALKS IN HOME: INDEPENDENT
HEARING - RIGHT EAR: FUNCTIONAL
PATIENT'S MEMORY ADEQUATE TO SAFELY COMPLETE DAILY ACTIVITIES?: YES
LACK_OF_TRANSPORTATION: NO
TOILETING: INDEPENDENT

## 2024-02-05 ASSESSMENT — PAIN SCALES - GENERAL
PAINLEVEL_OUTOF10: 3
PAINLEVEL_OUTOF10: 0 - NO PAIN

## 2024-02-05 ASSESSMENT — PATIENT HEALTH QUESTIONNAIRE - PHQ9
2. FEELING DOWN, DEPRESSED OR HOPELESS: NOT AT ALL
SUM OF ALL RESPONSES TO PHQ9 QUESTIONS 1 & 2: 0
1. LITTLE INTEREST OR PLEASURE IN DOING THINGS: NOT AT ALL

## 2024-02-05 ASSESSMENT — LIFESTYLE VARIABLES
HOW OFTEN DO YOU HAVE 6 OR MORE DRINKS ON ONE OCCASION: NEVER
HOW OFTEN DO YOU HAVE A DRINK CONTAINING ALCOHOL: NEVER
PRESCIPTION_ABUSE_PAST_12_MONTHS: NO
HOW MANY STANDARD DRINKS CONTAINING ALCOHOL DO YOU HAVE ON A TYPICAL DAY: PATIENT DOES NOT DRINK
SUBSTANCE_ABUSE_PAST_12_MONTHS: NO
AUDIT-C TOTAL SCORE: 0
AUDIT-C TOTAL SCORE: 0
SKIP TO QUESTIONS 9-10: 1

## 2024-02-05 ASSESSMENT — PAIN - FUNCTIONAL ASSESSMENT
PAIN_FUNCTIONAL_ASSESSMENT: 0-10
PAIN_FUNCTIONAL_ASSESSMENT: FLACC (FACE, LEGS, ACTIVITY, CRY, CONSOLABILITY)

## 2024-02-05 ASSESSMENT — PAIN DESCRIPTION - PROGRESSION: CLINICAL_PROGRESSION: NOT CHANGED

## 2024-02-05 ASSESSMENT — PAIN DESCRIPTION - LOCATION: LOCATION: HEAD

## 2024-02-05 NOTE — ED PROVIDER NOTES
HPI   Chief Complaint   Patient presents with    Flu Symptoms     Flu like symptoms, positive for covid last week       Patient is a 68-year-old female presents emergency department for evaluation of cough, congestion, inability eat or drink, and generalized fatigue malaise.  Patient states that last Tuesday she took an at home COVID test and was positive for COVID.  She states since then she has been on a steady decline.  She states over the last few days she has been unable to eat or drink because she is been so nauseous and has had multiple episodes of vomiting.  She is been having increased diarrhea and is concerned she may be dehydrated.  She states that she has been coughing a lot and congested, with some mild shortness of breath.  She denies any associated chest pain.  She has generalized aches and pains throughout her entire body.  She denies any associate abdominal pain and has been urinating without difficulty.  She has had fevers and chills as well.      History provided by:  Patient   used: No                        [place] Coma Scale Score: 15                  Patient History   Past Medical History:   Diagnosis Date    Pain in right hip 12/02/2021    Hip pain, bilateral    Personal history of other diseases of the respiratory system     Personal history of asthma    Personal history of other endocrine, nutritional and metabolic disease     History of hyperlipidemia    Personal history of other specified conditions 03/30/2017    History of snoring    Personal history of urinary (tract) infections 01/18/2022    History of urinary tract infection     Past Surgical History:   Procedure Laterality Date    OTHER SURGICAL HISTORY  09/23/2013    Wrist Surgery    TONSILLECTOMY  04/12/2013    Tonsillectomy     Family History   Problem Relation Name Age of Onset    Lung cancer Mother      Hypertension Brother       Social History     Tobacco Use    Smoking status: Never    Smokeless tobacco:  Never   Substance Use Topics    Alcohol use: Never    Drug use: Never       Physical Exam   ED Triage Vitals [02/05/24 1526]   Temperature Heart Rate Respirations BP   36.6 °C (97.9 °F) 70 20 (!) 158/91      Pulse Ox Temp Source Heart Rate Source Patient Position   98 % Oral Monitor Sitting      BP Location FiO2 (%)     Right arm --       Physical Exam  Constitutional:       Appearance: Normal appearance.      Comments: Fatigued appearing.   HENT:      Mouth/Throat:      Mouth: Mucous membranes are moist.   Eyes:      Extraocular Movements: Extraocular movements intact.      Pupils: Pupils are equal, round, and reactive to light.   Cardiovascular:      Rate and Rhythm: Normal rate and regular rhythm.   Pulmonary:      Effort: Pulmonary effort is normal.      Breath sounds: Normal breath sounds. No wheezing.   Abdominal:      General: Abdomen is flat.      Palpations: Abdomen is soft.      Tenderness: There is no abdominal tenderness.   Musculoskeletal:         General: Normal range of motion.   Skin:     General: Skin is warm and dry.   Neurological:      General: No focal deficit present.      Mental Status: She is alert and oriented to person, place, and time.         ED Course & MDM   ED Course as of 02/05/24 2035 Mon Feb 05, 2024   1547 EKG presented to me at 3:47 PM     EKG as interpreted by me shows normal sinus rhythm at a rate of 77 bpm, no STEMI, normal intervals   [DH]   1717 Attending MDM:  68-year-old female past medical history of hypertension presents the emergency room feeling unwell.  Patient notes a 6-day history of symptoms and diagnosis of COVID 6 days ago.  Patient notes significant nausea and vomiting with diarrhea and otherwise has been not hydrating well at home.  Patient notes shortness of breath as well as cough but notes no recent fevers.    [unfilled]  Vital signs as interpreted by me: Afebrile, not tachycardic, mildly hypertensive, 98% on room air    Exam:    Constitutional:  Uncomfortable appearing.    Head: Normocephalic, atraumatic.   Eyes: Pupils equal bilaterally, EOM grossly intact, conjunctiva normal.  Mouth/Throat: Oropharynx is clear, moist mucus membranes.   Neck: Supple. No lymphadenopathy.  Cardiovascular: Regular rate and regular rhythm. Extremities are well-perfused.    Pulmonary/Chest: No respiratory distress, breathing comfortably on room air.    Abdominal: Soft, non-tender, non-distended. No rebound or guarding.   Musculoskeletal: No lower extremity edema.       Skin: Warm, dry, and intact.   Neurological: Patient is oriented to person, place, time, and situation. Face symmetric, hearing intact to voice, speech normal. Moves all extremities.   Psych: Mood, affect, thought content, and judgment normal.    Differential includes but not limited to:  Electrolyte abnormality versus dehydration versus COVID versus pneumonia versus pneumothorax [DH]      ED Course User Index  [DH] Vaibhav Rucker MD         Diagnoses as of 02/05/24 2035   COVID-19   Hyponatremia       Medical Decision Making  Patient is a 68-year-old female presents emergency department for evaluation of flulike symptoms with recent COVID-positive test.    EKG was interpreted by attending physician and reviewed by me.    Lab work done today included CMP, CBC, urinalysis, and flu/COVID swabs.  Lab work with hyponatremia, mild electrolyte abnormalities, COVID-positive test, and otherwise unremarkable    Scans done today were interpreted/confirmed by radiologist and also interpreted by me which included x-ray.  Chest x-ray shows no acute cardiopulmonary disease.    Medications given at today's visit include IV fluids, IV Zofran, p.o. Tylenol    I saw this patient in conjunction with Dr. Rucker.  Patient still significantly fatigued and malaise.  She is COVID-positive today, but chest x-ray does not show any acute cardiopulmonary disease.  She is significantly hyponatremic and given this in conjunction with her inability  to eat and drink with generalized fatigue malaise she will be admitted for further management.  I spoke with Dr. Fierro who is admitting.  Patient's primary care provider Dr. Ellis was agreeable to admission of patient for further management.  Patient agreeable to admission at this time.    The patient/family was counseled on clinical impression, expectations, and plan along with recommendations to admission.  All questions were answered and involved parties were understanding and agreeable to course of treatment.  Case was discussed with admitting physician and any consultants. Bed type, ED treatment and further ED workup decided by joint decision making with admitting team and any consultants. Patient stable for admission per my assessment and further management of patient will be deferred to the inpatient setting.    ** Disclaimer:  Parts of this document were written utilizing a voice to text dictation software.  Note may contain minor transcription or typographical errors that were inadvertently transcribed by the computer software.        Procedure  Procedures     Valentina Boyer PA-C  02/05/24 2197

## 2024-02-06 ENCOUNTER — APPOINTMENT (OUTPATIENT)
Dept: CARDIOLOGY | Facility: HOSPITAL | Age: 69
DRG: 643 | End: 2024-02-06
Payer: COMMERCIAL

## 2024-02-06 LAB
ALBUMIN SERPL-MCNC: 4.3 G/DL (ref 3.5–5)
ALP BLD-CCNC: 98 U/L (ref 35–125)
ALT SERPL-CCNC: 29 U/L (ref 5–40)
ANION GAP SERPL CALC-SCNC: 9 MMOL/L
AST SERPL-CCNC: 30 U/L (ref 5–40)
BILIRUB SERPL-MCNC: 0.6 MG/DL (ref 0.1–1.2)
BUN SERPL-MCNC: 6 MG/DL (ref 8–25)
CALCIUM SERPL-MCNC: 10 MG/DL (ref 8.5–10.4)
CHLORIDE SERPL-SCNC: 91 MMOL/L (ref 97–107)
CHOLEST SERPL-MCNC: 234 MG/DL (ref 133–200)
CHOLEST/HDLC SERPL: 4 {RATIO}
CO2 SERPL-SCNC: 25 MMOL/L (ref 24–31)
CREAT SERPL-MCNC: 0.6 MG/DL (ref 0.4–1.6)
CREAT UR-MCNC: 44.8 MG/DL
EGFRCR SERPLBLD CKD-EPI 2021: >90 ML/MIN/1.73M*2
ERYTHROCYTE [DISTWIDTH] IN BLOOD BY AUTOMATED COUNT: 11.9 % (ref 11.5–14.5)
GLUCOSE SERPL-MCNC: 110 MG/DL (ref 65–99)
HCT VFR BLD AUTO: 41 % (ref 36–46)
HDLC SERPL-MCNC: 58 MG/DL
HGB BLD-MCNC: 14.4 G/DL (ref 12–16)
LDLC SERPL CALC-MCNC: 152 MG/DL (ref 65–130)
MCH RBC QN AUTO: 29.3 PG (ref 26–34)
MCHC RBC AUTO-ENTMCNC: 35.1 G/DL (ref 32–36)
MCV RBC AUTO: 84 FL (ref 80–100)
NRBC BLD-RTO: 0 /100 WBCS (ref 0–0)
OSMOLALITY SERPL: 254 MOSM/KG (ref 280–300)
OSMOLALITY UR: 382 MOSM/KG (ref 200–1200)
PLATELET # BLD AUTO: 314 X10*3/UL (ref 150–450)
POTASSIUM SERPL-SCNC: 3.9 MMOL/L (ref 3.4–5.1)
PROT SERPL-MCNC: 7.4 G/DL (ref 5.9–7.9)
RBC # BLD AUTO: 4.91 X10*6/UL (ref 4–5.2)
SODIUM SERPL-SCNC: 125 MMOL/L (ref 133–145)
SODIUM UR-SCNC: 125 MMOL/L
SODIUM/CREAT UR-RTO: 279 MMOL/G CREAT
TRIGL SERPL-MCNC: 119 MG/DL (ref 40–150)
WBC # BLD AUTO: 4.2 X10*3/UL (ref 4.4–11.3)

## 2024-02-06 PROCEDURE — 2500000002 HC RX 250 W HCPCS SELF ADMINISTERED DRUGS (ALT 637 FOR MEDICARE OP, ALT 636 FOR OP/ED): Performed by: INTERNAL MEDICINE

## 2024-02-06 PROCEDURE — 36415 COLL VENOUS BLD VENIPUNCTURE: CPT | Performed by: INTERNAL MEDICINE

## 2024-02-06 PROCEDURE — 85027 COMPLETE CBC AUTOMATED: CPT | Performed by: INTERNAL MEDICINE

## 2024-02-06 PROCEDURE — 1200000002 HC GENERAL ROOM WITH TELEMETRY DAILY

## 2024-02-06 PROCEDURE — 97162 PT EVAL MOD COMPLEX 30 MIN: CPT | Mod: GP

## 2024-02-06 PROCEDURE — 97165 OT EVAL LOW COMPLEX 30 MIN: CPT | Mod: GO

## 2024-02-06 PROCEDURE — 2500000004 HC RX 250 GENERAL PHARMACY W/ HCPCS (ALT 636 FOR OP/ED): Performed by: INTERNAL MEDICINE

## 2024-02-06 PROCEDURE — 9420000001 HC RT PATIENT EDUCATION 5 MIN

## 2024-02-06 PROCEDURE — 2500000001 HC RX 250 WO HCPCS SELF ADMINISTERED DRUGS (ALT 637 FOR MEDICARE OP): Performed by: INTERNAL MEDICINE

## 2024-02-06 PROCEDURE — 80053 COMPREHEN METABOLIC PANEL: CPT | Performed by: INTERNAL MEDICINE

## 2024-02-06 PROCEDURE — 93005 ELECTROCARDIOGRAM TRACING: CPT

## 2024-02-06 RX ORDER — ACETAMINOPHEN 500 MG
5 TABLET ORAL NIGHTLY
Status: DISCONTINUED | OUTPATIENT
Start: 2024-02-06 | End: 2024-02-09 | Stop reason: HOSPADM

## 2024-02-06 RX ORDER — GUAIFENESIN 600 MG/1
600 TABLET, EXTENDED RELEASE ORAL 2 TIMES DAILY PRN
Status: DISCONTINUED | OUTPATIENT
Start: 2024-02-06 | End: 2024-02-09 | Stop reason: HOSPADM

## 2024-02-06 RX ORDER — TRAMADOL HYDROCHLORIDE 50 MG/1
50 TABLET ORAL EVERY 6 HOURS PRN
Status: DISCONTINUED | OUTPATIENT
Start: 2024-02-06 | End: 2024-02-09 | Stop reason: HOSPADM

## 2024-02-06 RX ORDER — ALBUTEROL SULFATE 0.83 MG/ML
2.5 SOLUTION RESPIRATORY (INHALATION)
Status: DISCONTINUED | OUTPATIENT
Start: 2024-02-06 | End: 2024-02-06

## 2024-02-06 RX ORDER — ALBUTEROL SULFATE 0.83 MG/ML
2.5 SOLUTION RESPIRATORY (INHALATION) EVERY 2 HOUR PRN
Status: DISCONTINUED | OUTPATIENT
Start: 2024-02-06 | End: 2024-02-09 | Stop reason: HOSPADM

## 2024-02-06 RX ORDER — AMOXICILLIN AND CLAVULANATE POTASSIUM 875; 125 MG/1; MG/1
1 TABLET, FILM COATED ORAL EVERY 12 HOURS SCHEDULED
Status: DISCONTINUED | OUTPATIENT
Start: 2024-02-06 | End: 2024-02-09 | Stop reason: HOSPADM

## 2024-02-06 RX ADMIN — PANTOPRAZOLE SODIUM 40 MG: 40 TABLET, DELAYED RELEASE ORAL at 09:58

## 2024-02-06 RX ADMIN — TRIAMCINOLONE ACETONIDE: 1 CREAM TOPICAL at 09:58

## 2024-02-06 RX ADMIN — Medication 5 MG: at 21:30

## 2024-02-06 RX ADMIN — HEPARIN SODIUM 5000 UNITS: 5000 INJECTION, SOLUTION INTRAVENOUS; SUBCUTANEOUS at 05:09

## 2024-02-06 RX ADMIN — AMOXICILLIN AND CLAVULANATE POTASSIUM 1 TABLET: 875; 125 TABLET, FILM COATED ORAL at 21:30

## 2024-02-06 RX ADMIN — HEPARIN SODIUM 5000 UNITS: 5000 INJECTION, SOLUTION INTRAVENOUS; SUBCUTANEOUS at 14:46

## 2024-02-06 RX ADMIN — ACETAMINOPHEN 650 MG: 325 TABLET ORAL at 18:34

## 2024-02-06 RX ADMIN — ACETAMINOPHEN 650 MG: 325 TABLET ORAL at 09:56

## 2024-02-06 RX ADMIN — ONDANSETRON 4 MG: 2 INJECTION INTRAMUSCULAR; INTRAVENOUS at 05:09

## 2024-02-06 RX ADMIN — ACETAMINOPHEN 650 MG: 325 TABLET ORAL at 03:08

## 2024-02-06 RX ADMIN — HEPARIN SODIUM 5000 UNITS: 5000 INJECTION, SOLUTION INTRAVENOUS; SUBCUTANEOUS at 21:30

## 2024-02-06 RX ADMIN — GUAIFENESIN SYRUP AND DEXTROMETHORPHAN 5 ML: 100; 10 SYRUP ORAL at 03:08

## 2024-02-06 RX ADMIN — GUAIFENESIN 600 MG: 600 TABLET ORAL at 18:44

## 2024-02-06 RX ADMIN — DEXAMETHASONE 6 MG: 4 TABLET ORAL at 09:57

## 2024-02-06 RX ADMIN — LORATADINE 10 MG: 10 TABLET ORAL at 09:58

## 2024-02-06 RX ADMIN — TRAMADOL HYDROCHLORIDE 50 MG: 50 TABLET ORAL at 21:30

## 2024-02-06 RX ADMIN — FLUTICASONE PROPIONATE 1 SPRAY: 50 SPRAY, METERED NASAL at 09:58

## 2024-02-06 RX ADMIN — LEVOTHYROXINE SODIUM 125 MCG: 0.12 TABLET ORAL at 05:09

## 2024-02-06 SDOH — ECONOMIC STABILITY: FOOD INSECURITY: WITHIN THE PAST 12 MONTHS, YOU WORRIED THAT YOUR FOOD WOULD RUN OUT BEFORE YOU GOT MONEY TO BUY MORE.: NEVER TRUE

## 2024-02-06 SDOH — SOCIAL STABILITY: SOCIAL INSECURITY
WITHIN THE LAST YEAR, HAVE TO BEEN RAPED OR FORCED TO HAVE ANY KIND OF SEXUAL ACTIVITY BY YOUR PARTNER OR EX-PARTNER?: NO

## 2024-02-06 SDOH — ECONOMIC STABILITY: HOUSING INSECURITY
IN THE LAST 12 MONTHS, WAS THERE A TIME WHEN YOU DID NOT HAVE A STEADY PLACE TO SLEEP OR SLEPT IN A SHELTER (INCLUDING NOW)?: NO

## 2024-02-06 SDOH — SOCIAL STABILITY: SOCIAL INSECURITY: WITHIN THE LAST YEAR, HAVE YOU BEEN AFRAID OF YOUR PARTNER OR EX-PARTNER?: NO

## 2024-02-06 SDOH — SOCIAL STABILITY: SOCIAL NETWORK
DO YOU BELONG TO ANY CLUBS OR ORGANIZATIONS SUCH AS CHURCH GROUPS UNIONS, FRATERNAL OR ATHLETIC GROUPS, OR SCHOOL GROUPS?: NO

## 2024-02-06 SDOH — SOCIAL STABILITY: SOCIAL NETWORK: ARE YOU MARRIED, WIDOWED, DIVORCED, SEPARATED, NEVER MARRIED, OR LIVING WITH A PARTNER?: MARRIED

## 2024-02-06 SDOH — ECONOMIC STABILITY: INCOME INSECURITY: IN THE LAST 12 MONTHS, WAS THERE A TIME WHEN YOU WERE NOT ABLE TO PAY THE MORTGAGE OR RENT ON TIME?: NO

## 2024-02-06 SDOH — SOCIAL STABILITY: SOCIAL INSECURITY
WITHIN THE LAST YEAR, HAVE YOU BEEN KICKED, HIT, SLAPPED, OR OTHERWISE PHYSICALLY HURT BY YOUR PARTNER OR EX-PARTNER?: NO

## 2024-02-06 SDOH — ECONOMIC STABILITY: FOOD INSECURITY: WITHIN THE PAST 12 MONTHS, THE FOOD YOU BOUGHT JUST DIDN'T LAST AND YOU DIDN'T HAVE MONEY TO GET MORE.: NEVER TRUE

## 2024-02-06 SDOH — HEALTH STABILITY: MENTAL HEALTH: HOW OFTEN DO YOU HAVE 6 OR MORE DRINKS ON ONE OCCASION?: NEVER

## 2024-02-06 SDOH — SOCIAL STABILITY: SOCIAL NETWORK: HOW OFTEN DO YOU ATTEND CHURCH OR RELIGIOUS SERVICES?: NEVER

## 2024-02-06 SDOH — HEALTH STABILITY: MENTAL HEALTH: HOW OFTEN DO YOU HAVE A DRINK CONTAINING ALCOHOL?: NEVER

## 2024-02-06 SDOH — SOCIAL STABILITY: SOCIAL INSECURITY: WITHIN THE LAST YEAR, HAVE YOU BEEN HUMILIATED OR EMOTIONALLY ABUSED IN OTHER WAYS BY YOUR PARTNER OR EX-PARTNER?: NO

## 2024-02-06 SDOH — HEALTH STABILITY: MENTAL HEALTH: HOW MANY STANDARD DRINKS CONTAINING ALCOHOL DO YOU HAVE ON A TYPICAL DAY?: PATIENT DOES NOT DRINK

## 2024-02-06 SDOH — ECONOMIC STABILITY: INCOME INSECURITY: IN THE PAST 12 MONTHS, HAS THE ELECTRIC, GAS, OIL, OR WATER COMPANY THREATENED TO SHUT OFF SERVICE IN YOUR HOME?: NO

## 2024-02-06 SDOH — ECONOMIC STABILITY: HOUSING INSECURITY: IN THE LAST 12 MONTHS, HOW MANY PLACES HAVE YOU LIVED?: 1

## 2024-02-06 SDOH — SOCIAL STABILITY: SOCIAL NETWORK: HOW OFTEN DO YOU GET TOGETHER WITH FRIENDS OR RELATIVES?: MORE THAN THREE TIMES A WEEK

## 2024-02-06 SDOH — HEALTH STABILITY: PHYSICAL HEALTH: ON AVERAGE, HOW MANY MINUTES DO YOU ENGAGE IN EXERCISE AT THIS LEVEL?: 0 MIN

## 2024-02-06 SDOH — HEALTH STABILITY: PHYSICAL HEALTH: ON AVERAGE, HOW MANY DAYS PER WEEK DO YOU ENGAGE IN MODERATE TO STRENUOUS EXERCISE (LIKE A BRISK WALK)?: 0 DAYS

## 2024-02-06 SDOH — ECONOMIC STABILITY: TRANSPORTATION INSECURITY
IN THE PAST 12 MONTHS, HAS LACK OF TRANSPORTATION KEPT YOU FROM MEETINGS, WORK, OR FROM GETTING THINGS NEEDED FOR DAILY LIVING?: NO

## 2024-02-06 SDOH — SOCIAL STABILITY: SOCIAL NETWORK
IN A TYPICAL WEEK, HOW MANY TIMES DO YOU TALK ON THE PHONE WITH FAMILY, FRIENDS, OR NEIGHBORS?: MORE THAN THREE TIMES A WEEK

## 2024-02-06 SDOH — HEALTH STABILITY: MENTAL HEALTH
STRESS IS WHEN SOMEONE FEELS TENSE, NERVOUS, ANXIOUS, OR CAN'T SLEEP AT NIGHT BECAUSE THEIR MIND IS TROUBLED. HOW STRESSED ARE YOU?: ONLY A LITTLE

## 2024-02-06 SDOH — ECONOMIC STABILITY: TRANSPORTATION INSECURITY
IN THE PAST 12 MONTHS, HAS THE LACK OF TRANSPORTATION KEPT YOU FROM MEDICAL APPOINTMENTS OR FROM GETTING MEDICATIONS?: NO

## 2024-02-06 SDOH — ECONOMIC STABILITY: INCOME INSECURITY: HOW HARD IS IT FOR YOU TO PAY FOR THE VERY BASICS LIKE FOOD, HOUSING, MEDICAL CARE, AND HEATING?: NOT HARD AT ALL

## 2024-02-06 ASSESSMENT — PAIN - FUNCTIONAL ASSESSMENT
PAIN_FUNCTIONAL_ASSESSMENT: 0-10
PAIN_FUNCTIONAL_ASSESSMENT: 0-10
PAIN_FUNCTIONAL_ASSESSMENT: WONG-BAKER FACES
PAIN_FUNCTIONAL_ASSESSMENT: 0-10

## 2024-02-06 ASSESSMENT — COGNITIVE AND FUNCTIONAL STATUS - GENERAL
STANDING UP FROM CHAIR USING ARMS: A LITTLE
DRESSING REGULAR UPPER BODY CLOTHING: A LITTLE
STANDING UP FROM CHAIR USING ARMS: A LITTLE
MOBILITY SCORE: 20
MOVING TO AND FROM BED TO CHAIR: A LITTLE
CLIMB 3 TO 5 STEPS WITH RAILING: A LITTLE
EATING MEALS: A LITTLE
TOILETING: A LITTLE
HELP NEEDED FOR BATHING: A LITTLE
PERSONAL GROOMING: A LITTLE
CLIMB 3 TO 5 STEPS WITH RAILING: A LITTLE
EATING MEALS: A LITTLE
DRESSING REGULAR UPPER BODY CLOTHING: A LITTLE
TOILETING: A LITTLE
HELP NEEDED FOR BATHING: A LITTLE
DRESSING REGULAR LOWER BODY CLOTHING: A LITTLE
MOBILITY SCORE: 20
MOVING TO AND FROM BED TO CHAIR: A LITTLE
DAILY ACTIVITIY SCORE: 18
WALKING IN HOSPITAL ROOM: A LITTLE
DAILY ACTIVITIY SCORE: 18
PERSONAL GROOMING: A LITTLE
WALKING IN HOSPITAL ROOM: A LITTLE
DRESSING REGULAR LOWER BODY CLOTHING: A LITTLE

## 2024-02-06 ASSESSMENT — PAIN SCALES - GENERAL
PAINLEVEL_OUTOF10: 7
PAINLEVEL_OUTOF10: 0 - NO PAIN
PAINLEVEL_OUTOF10: 4
PAINLEVEL_OUTOF10: 0 - NO PAIN
PAINLEVEL_OUTOF10: 2

## 2024-02-06 ASSESSMENT — LIFESTYLE VARIABLES
SKIP TO QUESTIONS 9-10: 1
AUDIT-C TOTAL SCORE: 0

## 2024-02-06 ASSESSMENT — ACTIVITIES OF DAILY LIVING (ADL)
ADL_ASSISTANCE: INDEPENDENT
LACK_OF_TRANSPORTATION: NO
ADL_ASSISTANCE: INDEPENDENT

## 2024-02-06 ASSESSMENT — PAIN SCALES - WONG BAKER: WONGBAKER_NUMERICALRESPONSE: NO HURT

## 2024-02-06 ASSESSMENT — PAIN DESCRIPTION - LOCATION
LOCATION: HEAD
LOCATION: HEAD

## 2024-02-06 NOTE — PROGRESS NOTES
02/06/24 1610   Physical Activity   On average, how many days per week do you engage in moderate to strenuous exercise (like a brisk walk)? 0 days   On average, how many minutes do you engage in exercise at this level? 0 min   Financial Resource Strain   How hard is it for you to pay for the very basics like food, housing, medical care, and heating? Not hard   Housing Stability   In the last 12 months, was there a time when you were not able to pay the mortgage or rent on time? N   In the last 12 months, how many places have you lived? 1   In the last 12 months, was there a time when you did not have a steady place to sleep or slept in a shelter (including now)? N   Transportation Needs   In the past 12 months, has lack of transportation kept you from medical appointments or from getting medications? no   In the past 12 months, has lack of transportation kept you from meetings, work, or from getting things needed for daily living? No   Food Insecurity   Within the past 12 months, you worried that your food would run out before you got the money to buy more. Never true   Within the past 12 months, the food you bought just didn't last and you didn't have money to get more. Never true   Stress   Do you feel stress - tense, restless, nervous, or anxious, or unable to sleep at night because your mind is troubled all the time - these days? Only a littl   Social Connections   In a typical week, how many times do you talk on the phone with family, friends, or neighbors? More than 3   How often do you get together with friends or relatives? More than 3   How often do you attend Hinduism or Pentecostal services? Never   Do you belong to any clubs or organizations such as Hinduism groups, unions, fraternal or athletic groups, or school groups? No   Are you , , , , never , or living with a partner?    Intimate Partner Violence   Within the last year, have you been afraid of your partner  or ex-partner? No   Within the last year, have you been humiliated or emotionally abused in other ways by your partner or ex-partner? No   Within the last year, have you been kicked, hit, slapped, or otherwise physically hurt by your partner or ex-partner? No   Within the last year, have you been raped or forced to have any kind of sexual activity by your partner or ex-partner? No   Alcohol Use   Q1: How often do you have a drink containing alcohol? Never   Q2: How many drinks containing alcohol do you have on a typical day when you are drinking? None   Q3: How often do you have six or more drinks on one occasion? Never   Utilities   In the past 12 months has the electric, gas, oil, or water company threatened to shut off services in your home? No

## 2024-02-06 NOTE — H&P
History Of Present Illness  Ade Fisher is a 68 y.o. female presenting with generalized weakness, shortness of breath, nausea, cough, inability to eat.  About a week ago patient was diagnosed with COVID-19 and was treated with Paxlovid and dexamethasone.  In the emergency room initial workup was done.  Patient was found to have a hyponatremia.  Patient been admitted to the floor for further management.  Patient denied any nausea or vomiting.  Patient also complained of nausea and vomited yesterday.  Patient also complained diarrhea.  Patient complained of loss of taste and smell.  Patient also complaining of coughing a lot and congested.      Past Medical History  Past Medical History:   Diagnosis Date    Pain in right hip 12/02/2021    Hip pain, bilateral    Personal history of other diseases of the respiratory system     Personal history of asthma    Personal history of other endocrine, nutritional and metabolic disease     History of hyperlipidemia    Personal history of other specified conditions 03/30/2017    History of snoring    Personal history of urinary (tract) infections 01/18/2022    History of urinary tract infection       Surgical History  Past Surgical History:   Procedure Laterality Date    OTHER SURGICAL HISTORY  09/23/2013    Wrist Surgery    TONSILLECTOMY  04/12/2013    Tonsillectomy        Social History  She reports that she has never smoked. She has never used smokeless tobacco. She reports that she does not drink alcohol and does not use drugs.    Family History  Family History   Problem Relation Name Age of Onset    Lung cancer Mother      Hypertension Brother          Allergies  Sulfa (sulfonamide antibiotics)    Review of Systems  I reviewed all systems reviewed as above otherwise is negative.  Physical Exam  HEENT:  Head externally atraumatic, no pallor, no icterus, extraocular movements intact, pupils reactive to light, oral mucosa moist and throat clear.  Neck:  Supple, no JVP, no  "palpable adenopathy or thyromegaly.  No carotid bruit.  Chest: Bilateral occasional wheeze and decreased breath sounds.    Heart:  Regular rate and rhythm, no murmur or gallop could be appreciated.  Abdomen:  Soft, nontender, bowel sounds present, normoactive, no palpable hepatosplenomegaly.  Extremities:  No edema, pulses present, no cyanosis or clubbing.  CNS:  Patient alert, oriented to time, place and person.  Power 5/5 all over and deep tendon reflexes symmetrical, cranial nerves 2-12 grossly intact.  Skin:  No active rash.  Musculoskeletal:  No joint swelling or erythema, range of movement normal.  Last Recorded Vitals  Heart Rate:  [68-74]   Temp:  [36.6 °C (97.9 °F)-36.9 °C (98.4 °F)]   Resp:  [18-20]   BP: (135-158)/(71-91)   Height:  [157.5 cm (5' 2\")]   Weight:  [76 kg (167 lb 8.8 oz)-76.1 kg (167 lb 12.3 oz)]   SpO2:  [95 %-99 %]       Relevant Results        Results for orders placed or performed during the hospital encounter of 02/05/24 (from the past 24 hour(s))   CBC and Auto Differential   Result Value Ref Range    WBC 5.0 4.4 - 11.3 x10*3/uL    nRBC 0.0 0.0 - 0.0 /100 WBCs    RBC 4.77 4.00 - 5.20 x10*6/uL    Hemoglobin 14.1 12.0 - 16.0 g/dL    Hematocrit 39.8 36.0 - 46.0 %    MCV 83 80 - 100 fL    MCH 29.6 26.0 - 34.0 pg    MCHC 35.4 32.0 - 36.0 g/dL    RDW 12.1 11.5 - 14.5 %    Platelets 306 150 - 450 x10*3/uL    Neutrophils % 77.2 40.0 - 80.0 %    Immature Granulocytes %, Automated 0.4 0.0 - 0.9 %    Lymphocytes % 16.0 13.0 - 44.0 %    Monocytes % 6.4 2.0 - 10.0 %    Eosinophils % 0.0 0.0 - 6.0 %    Basophils % 0.0 0.0 - 2.0 %    Neutrophils Absolute 3.87 1.20 - 7.70 x10*3/uL    Immature Granulocytes Absolute, Automated 0.02 0.00 - 0.70 x10*3/uL    Lymphocytes Absolute 0.80 (L) 1.20 - 4.80 x10*3/uL    Monocytes Absolute 0.32 0.10 - 1.00 x10*3/uL    Eosinophils Absolute 0.00 0.00 - 0.70 x10*3/uL    Basophils Absolute 0.00 0.00 - 0.10 x10*3/uL   Comprehensive metabolic panel   Result Value Ref " Range    Glucose 121 (H) 65 - 99 mg/dL    Sodium 122 (L) 133 - 145 mmol/L    Potassium 3.8 3.4 - 5.1 mmol/L    Chloride 86 (L) 97 - 107 mmol/L    Bicarbonate 23 (L) 24 - 31 mmol/L    Urea Nitrogen 6 (L) 8 - 25 mg/dL    Creatinine 0.60 0.40 - 1.60 mg/dL    eGFR >90 >60 mL/min/1.73m*2    Calcium 9.9 8.5 - 10.4 mg/dL    Albumin 4.3 3.5 - 5.0 g/dL    Alkaline Phosphatase 102 35 - 125 U/L    Total Protein 7.4 5.9 - 7.9 g/dL    AST 25 5 - 40 U/L    Bilirubin, Total 0.7 0.1 - 1.2 mg/dL    ALT 25 5 - 40 U/L    Anion Gap 13 <=19 mmol/L   Influenza A, and B PCR   Result Value Ref Range    Flu A Result Not Detected Not Detected    Flu B Result Not Detected Not Detected   SARS-CoV-2 RT PCR   Result Value Ref Range    Coronavirus 2019, PCR Detected (A) Not Detected   Urinalysis with Reflex Culture and Microscopic   Result Value Ref Range    Color, Urine Colorless (N) Light-Yellow, Yellow, Dark-Yellow    Appearance, Urine Clear Clear    Specific Gravity, Urine 1.004 (N) 1.005 - 1.035    pH, Urine 7.0 5.0, 5.5, 6.0, 6.5, 7.0, 7.5, 8.0    Protein, Urine NEGATIVE NEGATIVE, 10 (TRACE), 20 (TRACE) mg/dL    Glucose, Urine Normal Normal mg/dL    Blood, Urine NEGATIVE NEGATIVE    Ketones, Urine 10 (1+) (A) NEGATIVE mg/dL    Bilirubin, Urine NEGATIVE NEGATIVE    Urobilinogen, Urine Normal Normal mg/dL    Nitrite, Urine NEGATIVE NEGATIVE    Leukocyte Esterase, Urine NEGATIVE NEGATIVE     Prior to Admission medications    Medication Sig Start Date End Date Taking? Authorizing Provider   albuterol 2.5 mg /3 mL (0.083 %) nebulizer solution Take 3 mL (2.5 mg) by nebulization 4 times a day. USE 1 UNIT DOSE EVERY 4-6 HOURS AS NEEDED FOR WHEEZING . 11/13/23   Jennifer Ellis MD   albuterol 90 mcg/actuation inhaler Inhale 2 puffs every 4 hours if needed for wheezing or shortness of breath. 2/2/24   Jennifer Ellis MD   budesonide-formoteroL (Symbicort) 160-4.5 mcg/actuation inhaler Inhale 2 puffs 2 times a day. RINSE MOUTH AFTER USE. 2/2/24   Jennifer QURESHI  MD Caleb   dexAMETHasone (Decadron) 6 mg tablet Take 1 tablet (6 mg) by mouth once daily for 10 days. 2/2/24 2/12/24  Jennifer Ellis MD   fluticasone (Cutivate) 0.05 % cream APPLY AND GENTLY MASSAGE INTO AFFECTED AREA(S) TWICE DAILY 10/19/23   Jennifer Ellis MD   fluticasone (Flonase) 50 mcg/actuation nasal spray Administer 1 spray into each nostril once daily. 11/13/23   Jennifer Ellis MD   levocetirizine (Xyzal) 5 mg tablet Take 1 tablet (5 mg) by mouth once daily. 11/13/23   Jennifer Ellis MD   levothyroxine (Synthroid, Levoxyl) 125 mcg tablet Take 1 tablet (125 mcg) by mouth once daily. 11/13/23 11/12/24  Jennifer Ellis MD   nirmatrelvir-ritonavir (PAXLOVID) 300 mg (150 mg x 2)-100 mg tablet therapy pack Take 3 tablets by mouth 2 times a day for 5 days. Follow the instructions on the package 1/31/24 2/5/24  Jennifer Ellis MD   olmesartan (BENIcar) 40 mg tablet Take 1 tablet (40 mg) by mouth once daily. 11/13/23   Jennifer Ellis MD   pantoprazole (ProtoNix) 40 mg EC tablet Take 1 tablet (40 mg) by mouth once daily. Do not crush, chew, or split.  Patient taking differently: Take 1 tablet (40 mg) by mouth if needed. Do not crush, chew, or split. 11/13/23 1/12/24  Jennifer Ellis MD   albuterol 90 mcg/actuation inhaler Inhale 2 puffs every 4 hours if needed for wheezing or shortness of breath. 12/19/23 2/2/24  Jennifer Ellis MD   budesonide-formoteroL (Symbicort) 160-4.5 mcg/actuation inhaler Inhale 2 puffs 2 times a day. RINSE MOUTH AFTER USE. 11/13/23 2/2/24  Jennifer Ellis MD       Current Facility-Administered Medications:     acetaminophen (Tylenol) tablet 650 mg, 650 mg, oral, q4h PRN **OR** acetaminophen (Tylenol) oral liquid 650 mg, 650 mg, oral, q4h PRN **OR** acetaminophen (Tylenol) suppository 650 mg, 650 mg, rectal, q4h PRN, Ben Fierro MD    albuterol 2.5 mg /3 mL (0.083 %) nebulizer solution 2.5 mg, 2.5 mg, nebulization, 4x daily, Ben Fierro MD    albuterol 2.5 mg /3 mL (0.083 %) nebulizer solution 2.5 mg,  2.5 mg, nebulization, q4h PRN, Ben Fierro MD    benzocaine-menthol (Cepastat Sore Throat) 15-3.6 mg lozenge 1 lozenge, 1 lozenge, Mouth/Throat, q2h PRN, Ben Fierro MD    [START ON 2/6/2024] dexAMETHasone (Decadron) tablet 6 mg, 6 mg, oral, Daily, Ben Fierro MD    dextromethorphan-guaifenesin (Robitussin DM)  mg/5 mL oral liquid 5 mL, 5 mL, oral, q4h PRN, Ben Fierro MD    [START ON 2/6/2024] fluticasone (Flonase) nasal spray 1 spray, 1 spray, Each Nostril, Daily, Ben Fierro MD    [START ON 2/6/2024] fluticasone furoate-vilanteroL (Breo Ellipta) 200-25 mcg/dose inhaler 1 puff, 1 puff, inhalation, Daily, Ben Fierro MD    guaiFENesin (Mucinex) 12 hr tablet 600 mg, 600 mg, oral, q12h PRN, Ben Fierro MD    heparin (porcine) injection 5,000 Units, 5,000 Units, subcutaneous, q8h ZAC, Ben Fierro MD    [START ON 2/6/2024] levothyroxine (Synthroid, Levoxyl) tablet 125 mcg, 125 mcg, oral, Daily, Ben Fierro MD    [START ON 2/6/2024] loratadine (Claritin) tablet 10 mg, 10 mg, oral, Daily, Ben Fierro MD    nirmatrelvir-ritonavir (PAXLOVID) tablet therapy pack 3 tablet, 3 tablet, oral, BID, Ben Fierro MD    ondansetron (Zofran) injection 4 mg, 4 mg, intravenous, q6h PRN, Ben Fierro MD    [START ON 2/6/2024] pantoprazole (ProtoNix) EC tablet 40 mg, 40 mg, oral, Daily, Ben Fierro MD    polyethylene glycol (Glycolax, Miralax) packet 17 g, 17 g, oral, Daily PRN, Ben Fierro MD    triamcinolone (Kenalog) 0.1 % cream, , Topical, BID, Ben Fierro MD  XR chest 2 views    Result Date: 2/5/2024  Interpreted By:  Kyler Greenfield, STUDY: XR CHEST 2 VIEWS; 2/5/2024 4:36 pm   INDICATION: Signs/Symptoms:cough, congestion   COMPARISON: 01/12/2022   ACCESSION NUMBER(S): [id number]   ORDERING CLINICIAN: AGUSTÍN BROCK   TECHNIQUE: PA and LAT views of the chest were obtained.   FINDINGS: The cardiomediastinal  silhouette is unremarkable. The lungs are clear. No pleural effusion is identified.   The osseous structures are intact.       No acute cardiopulmonary process.     Signed by: Kyler Greenfield 2/5/2024 4:58 PM Dictation workstation:   [id number]    ECG 12 Lead    Result Date: 1/20/2024  Normal sinus rhythm    No results found for the last 90 days.       Assessment/Plan   Principal Problem:    COVID-19  Hyponatremia  GERD  Hypothyroidism  Nausea and vomiting  Hypertension  COPD      Plan: Continue current medication.  Supportive care.  Physical therapy and Occupational Therapy.  CBC and BMP.  Patient has got a hyponatremia.  Patient was on telmisartan.  There could also contribute to hyponatremia.  Hyponatremia workup including urine, serum osmolality and urine sodium, TSH, free T4, lipid panel has been ordered.  We will take DVT, fall, aspiration decubitus pressure.  This has been discussed with patient.  Nephrology on consult.        Ben Fierro MD

## 2024-02-06 NOTE — PROGRESS NOTES
Physical Therapy    Physical Therapy Evaluation    Patient Name: Ade Fisher  MRN: [id number]  Today's Date: 2/6/2024   Time Calculation  Start Time: 1007  Stop Time: 1022  Time Calculation (min): 15 min    Assessment/Plan   PT Assessment  PT Assessment Results: Decreased strength, Decreased endurance, Impaired balance, Decreased mobility, Decreased safety awareness  Rehab Prognosis: Good  Evaluation/Treatment Tolerance: Patient tolerated treatment well  Medical Staff Made Aware: Yes  End of Session Communication: Bedside nurse  End of Session Patient Position: Bed, 3 rail up, Alarm on  IP OR SWING BED PT PLAN  Inpatient or Swing Bed: Inpatient  PT Plan  Treatment/Interventions: Transfer training, Gait training, Balance training, Strengthening, Endurance training, Therapeutic exercise, Therapeutic activity, Home exercise program  PT Plan: Skilled PT  PT Frequency: 3 times per week  PT Discharge Recommendations: Low intensity level of continued care  PT Recommended Transfer Status: Contact guard  PT - OK to Discharge: Yes      Subjective   General Visit Information:  General  Reason for Referral: Impaired Mobility  Referred By: Dr. Fierro  Past Medical History Relevant to Rehab: hip pain, asthma, hyperlipidemia, UTI  Family/Caregiver Present: Yes  Caregiver Feedback: son  Prior to Session Communication: Bedside nurse  Patient Position Received: Bed, 3 rail up, Alarm on  Preferred Learning Style: verbal  General Comment: 68 year old female admits with Covid, hyponatremia, GERD, hypothyroidism, N/V, HTN, and COPD  Home Living:  Home Living  Type of Home: House  Lives With: Spouse, Adult children  Home Adaptive Equipment: Cane  Home Layout: Two level, Able to live on main level with bedroom/bathroom  Home Access: Stairs to enter with rails  Entrance Stairs-Rails: Both  Entrance Stairs-Number of Steps: 2  Bathroom Shower/Tub: Walk-in shower  Prior Level of Function:  Prior Function Per Pt/Caregiver Report  Level of  [place]: Independent with ADLs and functional transfers, Independent with homemaking with ambulation  Receives Help From: Family  ADL Assistance: Independent  Homemaking Assistance: Independent  Ambulatory Assistance: Independent  Precautions:  Precautions  Medical Precautions: Fall precautions  Precautions Comment: c+  Vital Signs:   SPO2 93-96% throughout session on RA    Objective   Pain:  Pain Assessment  Pain Assessment: 0-10  Pain Score: 0 - No pain  Cognition:  Cognition  Overall Cognitive Status: Within Functional Limits    General Assessments:  Activity Tolerance  Endurance: Decreased tolerance for upright activites    Sensation  Light Touch: No apparent deficits  Functional Assessments:  Bed Mobility  Bed Mobility: Yes  Bed Mobility 1  Bed Mobility 1: Supine to sitting  Level of Assistance 1: Independent  Bed Mobility 2  Bed Mobility  2: Sitting to supine  Level of Assistance 2: Independent    Transfers  Transfer: Yes  Transfer 1  Transfer From 1: Bed to  Transfer to 1: Stand  Technique 1: Sit to stand, Stand to sit  Transfer Device 1:  (no AD)  Transfer Level of Assistance 1: Close supervision  Trials/Comments 1: Laborous but self completed. Mild sway once standing    Ambulation/Gait Training  Ambulation/Gait Training Performed: Yes  Ambulation/Gait Training 1  Surface 1: Level tile  Device 1: No device  Assistance 1: Close supervision  Quality of Gait 1:  (slow, laborous, mild sway noted but no LOB. Pt reports feeling weak and unsteady)  Comments/Distance (ft) 1: 60  Extremity/Trunk Assessments:  RLE   RLE : Exceptions to WFL  Strength RLE  RLE Overall Strength: Deficits (Pt reports feeling weak)  R Hip Flexion: 4+/5  R Knee Flexion: 4+/5  R Knee Extension: 4+/5  LLE   LLE : Exceptions to WFL (Pt reports feeling weak)  Strength LLE  LLE Overall Strength: Deficits  L Hip Flexion: 4+/5  L Knee Flexion: 4+/5  L Knee Extension: 4+/5  Outcome Measures:  [facility] Basic Mobility  Turning from your back to  your side while in a flat bed without using bedrails: None  Moving from lying on your back to sitting on the side of a flat bed without using bedrails: None  Moving to and from bed to chair (including a wheelchair): A little  Standing up from a chair using your arms (e.g. wheelchair or bedside chair): A little  To walk in hospital room: A little  Climbing 3-5 steps with railing: A little  Basic Mobility - Total Score: 20    Encounter Problems       Encounter Problems (Active)       Balance       Standing Balance (Progressing)       Start:  02/06/24    Expected End:  02/20/24         Pt will demonstrate good static standing balance to promote safe participation with out of bed activity, transfers, and mobility              Mobility       Ambulation (Progressing)       Start:  02/06/24    Expected End:  02/20/24       Pt will ambulate 150' independent assist with no device to promote safe home mobility              Pain - Adult          Safety       Safe Mobility Techniques (Progressing)       Start:  02/06/24    Expected End:  02/20/24       Pt will correctly identify and demonstrate safe mobility techniques to reduce their risks for falls during their acute care stay               Transfers       Sit to stand (Progressing)       Start:  02/06/24    Expected End:  02/20/24       Pt will transfer sit to standing position with independent assist and no device to promote safe out of bed activity                  Education Documentation  Mobility Training, taught by Gagan Simeon, PT at 2/6/2024 11:36 AM.  Learner: Family, Patient  Readiness: Acceptance  Method: Explanation, Demonstration  Response: Verbalizes Understanding  Comment: safe mobility techniques, risks of prolonged bedrest, benefits to continued OOB activity    Education Comments  No comments found.

## 2024-02-06 NOTE — PROGRESS NOTES
Occupational Therapy    Evaluation    Patient Name: Ade Fisher  MRN: [id number]  Today's Date: 2/6/2024  Time Calculation  Start Time: 0824  Stop Time: 0839  Time Calculation (min): 15 min    Assessment  IP OT Assessment  OT Assessment: 67 y/o female here with SOB, malaise, + covid19.  On eval, she requires slightly increased assist for xfers, mobility and self care d/t decreased strength, balance, activity tolerance.  Would benefit from skilled OT services while in house to maximize safety/IND prior to DC  Prognosis: Excellent  Barriers to Discharge: None  Evaluation/Treatment Tolerance: Patient tolerated treatment well  Medical Staff Made Aware: Yes  End of Session Communication: Bedside nurse  End of Session Patient Position: Bed, 2 rail up    Plan:  Treatment Interventions: ADL retraining, Functional transfer training, UE strengthening/ROM, Endurance training, Patient/family training, Equipment evaluation/education, Compensatory technique education  OT Frequency: 2 times per week  OT Discharge Recommendations: Low intensity level of continued care  OT Recommended Transfer Status: Assist of 1  OT - OK to Discharge: Yes    Subjective   Current Problem:  1. COVID-19        2. Hyponatremia          General:  General  Reason for Referral: Decreased ADLs  Referred By: Dr. Fierro  Past Medical History Relevant to Rehab: hip pain, asthma, hyperlipidemia, UTI  Family/Caregiver Present: Yes  Caregiver Feedback: spouse present, very supportive  Prior to Session Communication: Bedside nurse  Patient Position Received: Bed, 3 rail up  Preferred Learning Style: verbal  General Comment: Cleared by nsg, pt met in supine, agreeable to OT assessment  Precautions:  Medical Precautions: Fall precautions, Infection precautions (+ covid iso, on room air)    Pain:  Pain Assessment  Pain Assessment: 0-10  Pain Score: 0 - No pain    Objective   Cognition:  Overall Cognitive Status: Within Functional Limits    Home Living:  Type of  Home: House  Lives With: Spouse, Adult children  Home Adaptive Equipment: Cane  Home Layout: Two level, Able to live on main level with bedroom/bathroom  Home Access: Stairs to enter with rails  Entrance Stairs-Rails: Both  Entrance Stairs-Number of Steps: 2  Bathroom Shower/Tub: Walk-in shower     Prior Function:  Level of [place]: Independent with ADLs and functional transfers, Independent with homemaking with ambulation  Receives Help From: Family  ADL Assistance: Independent  Homemaking Assistance: Independent  Ambulatory Assistance: Independent    ADL:  Eating Assistance: Stand by  Eating Deficit: Setup  Grooming Assistance: Stand by  Grooming Deficit: Supervision/safety, Wash/dry hands (standing sinkside grooming)  UE Dressing Assistance: Minimal  UE Dressing Deficit: Pull around back  LE Dressing Assistance: Stand by  LE Dressing Deficit: Supervision/safety, Don/doff R sock, Don/doff L sock (donning socks while EOB sitting with figure 4 technique)  Toileting Assistance with Device: Stand by  Toileting Deficit: Supervison/safety    Activity Tolerance:  Endurance: Decreased tolerance for upright activites  Activity Tolerance Comments: on room air, c/o generalized malaise throughout session    Bed Mobility/Transfers:   Bed Mobility  Bed Mobility: Yes  Bed Mobility 1  Bed Mobility 1: Supine to sitting  Level of Assistance 1: Modified independent  Bed Mobility Comments 1: HOB elevated, increased effort  Bed Mobility 2  Bed Mobility  2: Sitting to supine  Level of Assistance 2: Modified independent  Bed Mobility Comments 2: manages trunk/LEs no assist    Transfers  Transfer: Yes  Transfer 1  Technique 1: Stand to sit, Sit to stand  Transfer Level of Assistance 1: Contact guard  Trials/Comments 1: STS to/from EOB with CGA for stability  Transfers 2  Technique 2:  (functional mobility)  Transfer Level of Assistance 2: Contact guard  Trials/Comments 2: ambulates short household distance within hospital room, no  device but requires CGA via hand held assist for stability  Transfers 3  Transfer From 3: Stand to  Transfer to 3: Toilet  Technique 3: Stand to sit  Transfer Level of Assistance 3: Close supervision  Trials/Comments 3: with grab bar use    Vision: Vision - Basic Assessment  Current Vision: No visual deficits  Sensation:  Light Touch: No apparent deficits  Strength:  Strength Comments: [place] WFL  Hand Function:  Hand Function  Gross Grasp: Functional  Coordination: Functional  Extremities: RUE   RUE : Within Functional Limits and LUE   LUE: Within Functional Limits    Outcome Measures: [facility] Daily Activity  Putting on and taking off regular lower body clothing: A little  Bathing (including washing, rinsing, drying): A little  Putting on and taking off regular upper body clothing: A little  Toileting, which includes using toilet, bedpan or urinal: A little  Taking care of personal grooming such as brushing teeth: A little  Eating Meals: A little  Daily Activity - Total Score: 18      Education Documentation  Body Mechanics, taught by Henry Buchanan OT at 2/6/2024  9:30 AM.  Learner: Patient  Readiness: Acceptance  Method: Explanation  Response: Needs Reinforcement    Precautions, taught by Henry Buchanan OT at 2/6/2024  9:30 AM.  Learner: Patient  Readiness: Acceptance  Method: Explanation  Response: Needs Reinforcement    Education Comments  No comments found.      Goals:   Encounter Problems       Encounter Problems (Active)       OT Goals       ADLs (Progressing)       Start:  02/06/24    Expected End:  02/20/24       Patient will complete ADLS at MOD I using AE/compensatory techniques as needed.          Functional Mobility (Progressing)       Start:  02/06/24    Expected End:  02/20/24       Patient will complete household distance mobility at MOD I using LRD.          Activity Tolerance (Progressing)       Start:  02/06/24    Expected End:  02/20/24       Patient will demonstrate improved activity  tolerance AEB completing functional tasks for >/= 15 minutes while remaining hemodynamically stable.

## 2024-02-06 NOTE — PROGRESS NOTES
02/06/24 1611   Discharge Planning   Living Arrangements Spouse/significant other   Support Systems Spouse/significant other   Assistance Needed none   Type of Residence Private residence   Home or Post Acute Services None   Patient expects to be discharged to: home   Does the patient need discharge transport arranged? No   Financial Resource Strain   How hard is it for you to pay for the very basics like food, housing, medical care, and heating? Not hard   Housing Stability   In the last 12 months, was there a time when you were not able to pay the mortgage or rent on time? N   In the last 12 months, how many places have you lived? 1   In the last 12 months, was there a time when you did not have a steady place to sleep or slept in a shelter (including now)? N   Transportation Needs   In the past 12 months, has lack of transportation kept you from medical appointments or from getting medications? no   In the past 12 months, has lack of transportation kept you from meetings, work, or from getting things needed for daily living? No

## 2024-02-06 NOTE — CONSULTS
.Reason For Consult  Hyponatremia    History Of Present Illness  Ade Fisher is a 68 y.o. female  who is known to have a history of arthritis no history of kidney disease or hyponatremia in the past and was diagnosed last week with COVID-19 as an outpatient she was treated with steroids as well as Paxlovid however symptoms had progressed significantly she became extremely weak she starting some nausea which is extremely severe sinus pressure difficulty ambulating she did have an emesis yesterday she told me she never had received vaccination for COVID-19 the patient renal consultation because of hyponatremia the patient mental status is clear she does have some headache     Review of Systems    10 point review of system was done all negative except was positive for the history of present illness    Past Medical History  She has a past medical history of Pain in right hip (12/02/2021), Personal history of other diseases of the respiratory system, Personal history of other endocrine, nutritional and metabolic disease, Personal history of other specified conditions (03/30/2017), and Personal history of urinary (tract) infections (01/18/2022).    Surgical History  She has a past surgical history that includes Tonsillectomy (04/12/2013) and Other surgical history (09/23/2013).     Social History  She reports that she has never smoked. She has never used smokeless tobacco. She reports that she does not drink alcohol and does not use drugs.    Family History  Family History   Problem Relation Name Age of Onset    Lung cancer Mother      Hypertension Brother          Current Facility-Administered Medications:     acetaminophen (Tylenol) tablet 650 mg, 650 mg, oral, q4h PRN, 650 mg at 02/06/24 0956 **OR** acetaminophen (Tylenol) oral liquid 650 mg, 650 mg, oral, q4h PRN **OR** acetaminophen (Tylenol) suppository 650 mg, 650 mg, rectal, q4h PRN, Ben Fierro MD    albuterol 2.5 mg /3 mL (0.083 %) nebulizer solution  2.5 mg, 2.5 mg, nebulization, q4h PRN, Ben Fierro MD    albuterol 2.5 mg /3 mL (0.083 %) nebulizer solution 2.5 mg, 2.5 mg, nebulization, 4x daily, Ben Fierro MD    benzocaine-menthol (Cepastat Sore Throat) 15-3.6 mg lozenge 1 lozenge, 1 lozenge, Mouth/Throat, q2h PRN, Ben Fierro MD    dexAMETHasone (Decadron) tablet 6 mg, 6 mg, oral, Daily, Ben Fierro MD, 6 mg at 02/06/24 0957    dextromethorphan-guaifenesin (Robitussin DM)  mg/5 mL oral liquid 5 mL, 5 mL, oral, q4h PRN, Ben Fierro MD, 5 mL at 02/06/24 0308    fluticasone (Flonase) nasal spray 1 spray, 1 spray, Each Nostril, Daily, Ben Fierro MD, 1 spray at 02/06/24 0958    [Held by provider] fluticasone furoate-vilanteroL (Breo Ellipta) 200-25 mcg/dose inhaler 1 puff, 1 puff, inhalation, Daily, Ben Fierro MD    guaiFENesin (Mucinex) 12 hr tablet 600 mg, 600 mg, oral, q12h PRN, Ben Fierro MD    heparin (porcine) injection 5,000 Units, 5,000 Units, subcutaneous, q8h ZAC, Ben Fierro MD, 5,000 Units at 02/06/24 0509    levothyroxine (Synthroid, Levoxyl) tablet 125 mcg, 125 mcg, oral, Daily before breakfast, Ben Fierro MD, 125 mcg at 02/06/24 0509    loratadine (Claritin) tablet 10 mg, 10 mg, oral, Daily, Ben Fierro MD, 10 mg at 02/06/24 0958    ondansetron (Zofran) injection 4 mg, 4 mg, intravenous, q6h PRN, Ben Fierro MD, 4 mg at 02/06/24 0509    pantoprazole (ProtoNix) EC tablet 40 mg, 40 mg, oral, Daily, Ben Fierro MD, 40 mg at 02/06/24 0958    polyethylene glycol (Glycolax, Miralax) packet 17 g, 17 g, oral, Daily PRN, Ben Fierro MD    traMADol (Ultram) tablet 50 mg, 50 mg, oral, q6h PRN, Ben Fierro MD    triamcinolone (Kenalog) 0.1 % cream, , Topical, BID, Ben Fierro MD, Given at 02/06/24 0958   Allergies  Sulfa (sulfonamide antibiotics)         Physical Exam   through the glass window she does not appear in any  "respiratory distress   the neck without increased mass pressure   lungs movements are intact   heart regular rhythm   on the monitor   extremities no edema       Intake/Output Summary (Last 24 hours) at 2/6/2024 1047  Last data filed at 2/6/2024 0150  Gross per 24 hour   Intake --   Output 550 ml   Net -550 ml       Vitals 24HR  Heart Rate:  [68-87]   Temp:  [36.6 °C (97.9 °F)-36.9 °C (98.4 °F)]   Resp:  [18-20]   BP: (135-158)/(65-91)   Height:  [157.5 cm (5' 2\")]   Weight:  [76 kg (167 lb 8.8 oz)-76.1 kg (167 lb 12.3 oz)]   SpO2:  [95 %-99 %]     Relevant Results        Results for orders placed or performed during the hospital encounter of 02/05/24 (from the past 96 hour(s))   CBC and Auto Differential   Result Value Ref Range    WBC 5.0 4.4 - 11.3 x10*3/uL    nRBC 0.0 0.0 - 0.0 /100 WBCs    RBC 4.77 4.00 - 5.20 x10*6/uL    Hemoglobin 14.1 12.0 - 16.0 g/dL    Hematocrit 39.8 36.0 - 46.0 %    MCV 83 80 - 100 fL    MCH 29.6 26.0 - 34.0 pg    MCHC 35.4 32.0 - 36.0 g/dL    RDW 12.1 11.5 - 14.5 %    Platelets 306 150 - 450 x10*3/uL    Neutrophils % 77.2 40.0 - 80.0 %    Immature Granulocytes %, Automated 0.4 0.0 - 0.9 %    Lymphocytes % 16.0 13.0 - 44.0 %    Monocytes % 6.4 2.0 - 10.0 %    Eosinophils % 0.0 0.0 - 6.0 %    Basophils % 0.0 0.0 - 2.0 %    Neutrophils Absolute 3.87 1.20 - 7.70 x10*3/uL    Immature Granulocytes Absolute, Automated 0.02 0.00 - 0.70 x10*3/uL    Lymphocytes Absolute 0.80 (L) 1.20 - 4.80 x10*3/uL    Monocytes Absolute 0.32 0.10 - 1.00 x10*3/uL    Eosinophils Absolute 0.00 0.00 - 0.70 x10*3/uL    Basophils Absolute 0.00 0.00 - 0.10 x10*3/uL   Comprehensive metabolic panel   Result Value Ref Range    Glucose 121 (H) 65 - 99 mg/dL    Sodium 122 (L) 133 - 145 mmol/L    Potassium 3.8 3.4 - 5.1 mmol/L    Chloride 86 (L) 97 - 107 mmol/L    Bicarbonate 23 (L) 24 - 31 mmol/L    Urea Nitrogen 6 (L) 8 - 25 mg/dL    Creatinine 0.60 0.40 - 1.60 mg/dL    eGFR >90 >60 mL/min/1.73m*2    Calcium 9.9 8.5 - 10.4 " mg/dL    Albumin 4.3 3.5 - 5.0 g/dL    Alkaline Phosphatase 102 35 - 125 U/L    Total Protein 7.4 5.9 - 7.9 g/dL    AST 25 5 - 40 U/L    Bilirubin, Total 0.7 0.1 - 1.2 mg/dL    ALT 25 5 - 40 U/L    Anion Gap 13 <=19 mmol/L   Influenza A, and B PCR   Result Value Ref Range    Flu A Result Not Detected Not Detected    Flu B Result Not Detected Not Detected   SARS-CoV-2 RT PCR   Result Value Ref Range    Coronavirus 2019, PCR Detected (A) Not Detected   Urinalysis with Reflex Culture and Microscopic   Result Value Ref Range    Color, Urine Colorless (N) Light-Yellow, Yellow, Dark-Yellow    Appearance, Urine Clear Clear    Specific Gravity, Urine 1.004 (N) 1.005 - 1.035    pH, Urine 7.0 5.0, 5.5, 6.0, 6.5, 7.0, 7.5, 8.0    Protein, Urine NEGATIVE NEGATIVE, 10 (TRACE), 20 (TRACE) mg/dL    Glucose, Urine Normal Normal mg/dL    Blood, Urine NEGATIVE NEGATIVE    Ketones, Urine 10 (1+) (A) NEGATIVE mg/dL    Bilirubin, Urine NEGATIVE NEGATIVE    Urobilinogen, Urine Normal Normal mg/dL    Nitrite, Urine NEGATIVE NEGATIVE    Leukocyte Esterase, Urine NEGATIVE NEGATIVE   Thyroxine, Free   Result Value Ref Range    Thyroxine, Free 1.50 0.90 - 1.70 ng/dL   Thyroid Stimulating Hormone   Result Value Ref Range    Thyroid Stimulating Hormone 2.47 0.27 - 4.20 mIU/L   Lipid Panel   Result Value Ref Range    Cholesterol 234 (H) 133 - 200 mg/dL    HDL-Cholesterol 58.0 >50.0 mg/dL    Cholesterol/HDL Ratio 4.0 SEE COMMENT    LDL Calculated 152 (H) 65 - 130 mg/dL    Triglycerides 119 40 - 150 mg/dL   Magnesium   Result Value Ref Range    Magnesium 1.80 1.60 - 3.10 mg/dL   Basic Metabolic Panel   Result Value Ref Range    Glucose 104 (H) 65 - 99 mg/dL    Sodium 122 (L) 133 - 145 mmol/L    Potassium 4.1 3.4 - 5.1 mmol/L    Chloride 89 (L) 97 - 107 mmol/L    Bicarbonate 23 (L) 24 - 31 mmol/L    Urea Nitrogen 6 (L) 8 - 25 mg/dL    Creatinine 0.60 0.40 - 1.60 mg/dL    eGFR >90 >60 mL/min/1.73m*2    Calcium 9.1 8.5 - 10.4 mg/dL    Anion Gap 10  <=19 mmol/L   Sodium, Urine Random   Result Value Ref Range    Sodium, Urine Random 125 NOT ESTABLISHED mmol/L    Creatinine, Urine Random 44.8 NOT ESTABLISHED mg/dL    Sodium/Creatinine Ratio 279 Not established. mmol/g Creat   Urinalysis with Reflex Microscopic   Result Value Ref Range    Color, Urine Colorless (N) Light-Yellow, Yellow, Dark-Yellow    Appearance, Urine Clear Clear    Specific Gravity, Urine 1.009 1.005 - 1.035    pH, Urine 7.0 5.0, 5.5, 6.0, 6.5, 7.0, 7.5, 8.0    Protein, Urine NEGATIVE NEGATIVE, 10 (TRACE), 20 (TRACE) mg/dL    Glucose, Urine Normal Normal mg/dL    Blood, Urine NEGATIVE NEGATIVE    Ketones, Urine 40 (2+) (A) NEGATIVE mg/dL    Bilirubin, Urine NEGATIVE NEGATIVE    Urobilinogen, Urine Normal Normal mg/dL    Nitrite, Urine NEGATIVE NEGATIVE    Leukocyte Esterase, Urine NEGATIVE NEGATIVE   Comprehensive metabolic panel   Result Value Ref Range    Glucose 110 (H) 65 - 99 mg/dL    Sodium 125 (L) 133 - 145 mmol/L    Potassium 3.9 3.4 - 5.1 mmol/L    Chloride 91 (L) 97 - 107 mmol/L    Bicarbonate 25 24 - 31 mmol/L    Urea Nitrogen 6 (L) 8 - 25 mg/dL    Creatinine 0.60 0.40 - 1.60 mg/dL    eGFR >90 >60 mL/min/1.73m*2    Calcium 10.0 8.5 - 10.4 mg/dL    Albumin 4.3 3.5 - 5.0 g/dL    Alkaline Phosphatase 98 35 - 125 U/L    Total Protein 7.4 5.9 - 7.9 g/dL    AST 30 5 - 40 U/L    Bilirubin, Total 0.6 0.1 - 1.2 mg/dL    ALT 29 5 - 40 U/L    Anion Gap 9 <=19 mmol/L   CBC   Result Value Ref Range    WBC 4.2 (L) 4.4 - 11.3 x10*3/uL    nRBC 0.0 0.0 - 0.0 /100 WBCs    RBC 4.91 4.00 - 5.20 x10*6/uL    Hemoglobin 14.4 12.0 - 16.0 g/dL    Hematocrit 41.0 36.0 - 46.0 %    MCV 84 80 - 100 fL    MCH 29.3 26.0 - 34.0 pg    MCHC 35.1 32.0 - 36.0 g/dL    RDW 11.9 11.5 - 14.5 %    Platelets 314 150 - 450 x10*3/uL          Assessment/Plan     XR chest 2 views    Result Date: 2/5/2024  Interpreted By:  Kyler Greenfield, STUDY: XR CHEST 2 VIEWS; 2/5/2024 4:36 pm   INDICATION: Signs/Symptoms:cough, congestion    COMPARISON: 01/12/2022   ACCESSION NUMBER(S): [id number]   ORDERING CLINICIAN: AGUSTÍN BROCK   TECHNIQUE: PA and LAT views of the chest were obtained.   FINDINGS: The cardiomediastinal silhouette is unremarkable. The lungs are clear. No pleural effusion is identified.   The osseous structures are intact.       No acute cardiopulmonary process.     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

## 2024-02-06 NOTE — PROGRESS NOTES
"Ade Fisher is a 68 y.o. female on day 1 of admission presenting with COVID-19.    Subjective   The patient was seen and examined.  Patient is complaining of headache.  Patient denied any nausea or vomiting.  No fever chills or rigor.       Objective     Physical Exam  HEENT:  Head externally atraumatic, no pallor, no icterus, extraocular movements intact, pupils reacting to light, oral mucosa moist and throat clear.  Patient had left-sided frontal and maxillary sinus tenderness  Neck:  Supple, no JVP, no palpable adenopathy or thyromegaly.  No carotid bruit.  Chest: Bilateral occasional wheeze and decreased breath sound  Heart:  Regular rate and rhythm, no murmur or gallop could be appreciated.  Abdomen:  Soft, nontender, bowel sounds present, normoactive, no palpable hepatosplenomegaly.  Extremities:  No edema, pulses present, no cyanosis or clubbing.  CNS:  Patient alert, oriented to time, place and person.  Power 5/5 all over and deep tendon reflexes symmetrical, cranial nerves 2-12 grossly intact.  Skin:  No active rash.  Musculoskeletal:  No joint swelling or erythema, range of movement normal.  Last Recorded Vitals  Heart Rate:  [64-91]   Temp:  [36.7 °C (98.1 °F)-37.1 °C (98.8 °F)]   Resp:  [17-18]   BP: (135-152)/(65-89)   Height:  [157.5 cm (5' 2\")]   Weight:  [76.1 kg (167 lb 12.3 oz)]   SpO2:  [94 %-99 %]     Intake/Output last 3 Shifts:  I/O last 3 completed shifts:  In: - (0 mL/kg)   Out: 550 (7.2 mL/kg) [Urine:550 (0.2 mL/kg/hr)]  Weight: 76.1 kg     Relevant Results  No results found for the last 90 days.    Results for orders placed or performed during the hospital encounter of 02/05/24 (from the past 24 hour(s))   Urinalysis with Reflex Culture and Microscopic   Result Value Ref Range    Color, Urine Colorless (N) Light-Yellow, Yellow, Dark-Yellow    Appearance, Urine Clear Clear    Specific Gravity, Urine 1.004 (N) 1.005 - 1.035    pH, Urine 7.0 5.0, 5.5, 6.0, 6.5, 7.0, 7.5, 8.0    Protein, " Urine NEGATIVE NEGATIVE, 10 (TRACE), 20 (TRACE) mg/dL    Glucose, Urine Normal Normal mg/dL    Blood, Urine NEGATIVE NEGATIVE    Ketones, Urine 10 (1+) (A) NEGATIVE mg/dL    Bilirubin, Urine NEGATIVE NEGATIVE    Urobilinogen, Urine Normal Normal mg/dL    Nitrite, Urine NEGATIVE NEGATIVE    Leukocyte Esterase, Urine NEGATIVE NEGATIVE   Thyroxine, Free   Result Value Ref Range    Thyroxine, Free 1.50 0.90 - 1.70 ng/dL   Thyroid Stimulating Hormone   Result Value Ref Range    Thyroid Stimulating Hormone 2.47 0.27 - 4.20 mIU/L   Lipid Panel   Result Value Ref Range    Cholesterol 234 (H) 133 - 200 mg/dL    HDL-Cholesterol 58.0 >50.0 mg/dL    Cholesterol/HDL Ratio 4.0 SEE COMMENT    LDL Calculated 152 (H) 65 - 130 mg/dL    Triglycerides 119 40 - 150 mg/dL   Magnesium   Result Value Ref Range    Magnesium 1.80 1.60 - 3.10 mg/dL   Osmolality   Result Value Ref Range    Osmolality, Serum 254 (L) 280 - 300 mOsm/kg   Basic Metabolic Panel   Result Value Ref Range    Glucose 104 (H) 65 - 99 mg/dL    Sodium 122 (L) 133 - 145 mmol/L    Potassium 4.1 3.4 - 5.1 mmol/L    Chloride 89 (L) 97 - 107 mmol/L    Bicarbonate 23 (L) 24 - 31 mmol/L    Urea Nitrogen 6 (L) 8 - 25 mg/dL    Creatinine 0.60 0.40 - 1.60 mg/dL    eGFR >90 >60 mL/min/1.73m*2    Calcium 9.1 8.5 - 10.4 mg/dL    Anion Gap 10 <=19 mmol/L   Sodium, Urine Random   Result Value Ref Range    Sodium, Urine Random 125 NOT ESTABLISHED mmol/L    Creatinine, Urine Random 44.8 NOT ESTABLISHED mg/dL    Sodium/Creatinine Ratio 279 Not established. mmol/g Creat   Osmolality, Urine   Result Value Ref Range    Osmolality, Urine Random 382 200 - 1,200 mOsm/kg   Urinalysis with Reflex Microscopic   Result Value Ref Range    Color, Urine Colorless (N) Light-Yellow, Yellow, Dark-Yellow    Appearance, Urine Clear Clear    Specific Gravity, Urine 1.009 1.005 - 1.035    pH, Urine 7.0 5.0, 5.5, 6.0, 6.5, 7.0, 7.5, 8.0    Protein, Urine NEGATIVE NEGATIVE, 10 (TRACE), 20 (TRACE) mg/dL     Glucose, Urine Normal Normal mg/dL    Blood, Urine NEGATIVE NEGATIVE    Ketones, Urine 40 (2+) (A) NEGATIVE mg/dL    Bilirubin, Urine NEGATIVE NEGATIVE    Urobilinogen, Urine Normal Normal mg/dL    Nitrite, Urine NEGATIVE NEGATIVE    Leukocyte Esterase, Urine NEGATIVE NEGATIVE   Comprehensive metabolic panel   Result Value Ref Range    Glucose 110 (H) 65 - 99 mg/dL    Sodium 125 (L) 133 - 145 mmol/L    Potassium 3.9 3.4 - 5.1 mmol/L    Chloride 91 (L) 97 - 107 mmol/L    Bicarbonate 25 24 - 31 mmol/L    Urea Nitrogen 6 (L) 8 - 25 mg/dL    Creatinine 0.60 0.40 - 1.60 mg/dL    eGFR >90 >60 mL/min/1.73m*2    Calcium 10.0 8.5 - 10.4 mg/dL    Albumin 4.3 3.5 - 5.0 g/dL    Alkaline Phosphatase 98 35 - 125 U/L    Total Protein 7.4 5.9 - 7.9 g/dL    AST 30 5 - 40 U/L    Bilirubin, Total 0.6 0.1 - 1.2 mg/dL    ALT 29 5 - 40 U/L    Anion Gap 9 <=19 mmol/L   CBC   Result Value Ref Range    WBC 4.2 (L) 4.4 - 11.3 x10*3/uL    nRBC 0.0 0.0 - 0.0 /100 WBCs    RBC 4.91 4.00 - 5.20 x10*6/uL    Hemoglobin 14.4 12.0 - 16.0 g/dL    Hematocrit 41.0 36.0 - 46.0 %    MCV 84 80 - 100 fL    MCH 29.3 26.0 - 34.0 pg    MCHC 35.1 32.0 - 36.0 g/dL    RDW 11.9 11.5 - 14.5 %    Platelets 314 150 - 450 x10*3/uL        Current Facility-Administered Medications:     acetaminophen (Tylenol) tablet 650 mg, 650 mg, oral, q4h PRN, 650 mg at 02/06/24 0956 **OR** acetaminophen (Tylenol) oral liquid 650 mg, 650 mg, oral, q4h PRN **OR** acetaminophen (Tylenol) suppository 650 mg, 650 mg, rectal, q4h PRN, Ben Fierro MD    albuterol 2.5 mg /3 mL (0.083 %) nebulizer solution 2.5 mg, 2.5 mg, nebulization, q4h PRN, Ben Fierro MD    albuterol 2.5 mg /3 mL (0.083 %) nebulizer solution 2.5 mg, 2.5 mg, nebulization, q2h PRN, Ben Fierro MD    amoxicillin-pot clavulanate (Augmentin) 875-125 mg per tablet 1 tablet, 1 tablet, oral, q12h ZAC, Ben Fierro MD    benzocaine-menthol (Cepastat Sore Throat) 15-3.6 mg lozenge 1 lozenge, 1  lozenge, Mouth/Throat, q2h PRN, Ben Fierro MD    dexAMETHasone (Decadron) tablet 6 mg, 6 mg, oral, Daily, Ben Fierro MD, 6 mg at 02/06/24 0957    dextromethorphan-guaifenesin (Robitussin DM)  mg/5 mL oral liquid 5 mL, 5 mL, oral, q4h PRN, Ben Fierro MD, 5 mL at 02/06/24 0308    fluticasone (Flonase) nasal spray 1 spray, 1 spray, Each Nostril, Daily, Ben Fierro MD, 1 spray at 02/06/24 0958    [Held by provider] fluticasone furoate-vilanteroL (Breo Ellipta) 200-25 mcg/dose inhaler 1 puff, 1 puff, inhalation, Daily, Ben Fierro MD    guaiFENesin (Mucinex) 12 hr tablet 600 mg, 600 mg, oral, q12h PRN, Ben Fierro MD    guaiFENesin (Mucinex) 12 hr tablet 600 mg, 600 mg, oral, BID PRN, Ben Fierro MD    heparin (porcine) injection 5,000 Units, 5,000 Units, subcutaneous, q8h ZAC, Ben Fierro MD, 5,000 Units at 02/06/24 1446    levothyroxine (Synthroid, Levoxyl) tablet 125 mcg, 125 mcg, oral, Daily before breakfast, Ben Fierro MD, 125 mcg at 02/06/24 0509    loratadine (Claritin) tablet 10 mg, 10 mg, oral, Daily, Ben Fierro MD, 10 mg at 02/06/24 0958    ondansetron (Zofran) injection 4 mg, 4 mg, intravenous, q6h PRN, Ben Fierro MD, 4 mg at 02/06/24 0509    pantoprazole (ProtoNix) EC tablet 40 mg, 40 mg, oral, Daily, Ben Fierro MD, 40 mg at 02/06/24 0958    polyethylene glycol (Glycolax, Miralax) packet 17 g, 17 g, oral, Daily PRN, Ben Fierro MD    traMADol (Ultram) tablet 50 mg, 50 mg, oral, q6h PRN, Ben Fierro MD    triamcinolone (Kenalog) 0.1 % cream, , Topical, BID, Ben Fierro MD, Given at 02/06/24 0958   Assessment/Plan   Principal Problem:    COVID-19  Hyponatremia  Sinusitis  Hypothyroidism  COVID-19  COPD  Hypertension  Hypothyroidism  Nausea and vomiting    Plan: Continue current medication.  Supportive care.  Physical therapy and Occupational Therapy.  Monitor closely and BMP.   Patient has got acute frontal and maxillary sinusitis.  Patient started on Augmentin.  Patient also started on the Mucinex.  Continue current medication.  Sodium levels improved to 125.  Still low.  Nephrology input appreciated.  Continue to hold telmisartan.  Monitor blood pressure appears fairly controlled at this time.  Will take DVT, fall, aspiration, decubitus and DVT precaution.  This has been discussed with the patient and agreeable to it.       Ben Fierro MD

## 2024-02-07 LAB
ANION GAP SERPL CALC-SCNC: 13 MMOL/L
BASOPHILS # BLD AUTO: 0 X10*3/UL (ref 0–0.1)
BASOPHILS NFR BLD AUTO: 0 %
BUN SERPL-MCNC: 15 MG/DL (ref 8–25)
CALCIUM SERPL-MCNC: 10 MG/DL (ref 8.5–10.4)
CHLORIDE SERPL-SCNC: 93 MMOL/L (ref 97–107)
CO2 SERPL-SCNC: 21 MMOL/L (ref 24–31)
CREAT SERPL-MCNC: 0.6 MG/DL (ref 0.4–1.6)
EGFRCR SERPLBLD CKD-EPI 2021: >90 ML/MIN/1.73M*2
EOSINOPHIL # BLD AUTO: 0 X10*3/UL (ref 0–0.7)
EOSINOPHIL NFR BLD AUTO: 0 %
ERYTHROCYTE [DISTWIDTH] IN BLOOD BY AUTOMATED COUNT: 12.1 % (ref 11.5–14.5)
GLUCOSE SERPL-MCNC: 134 MG/DL (ref 65–99)
HCT VFR BLD AUTO: 40.8 % (ref 36–46)
HGB BLD-MCNC: 14.5 G/DL (ref 12–16)
IMM GRANULOCYTES # BLD AUTO: 0.04 X10*3/UL (ref 0–0.7)
IMM GRANULOCYTES NFR BLD AUTO: 0.7 % (ref 0–0.9)
LYMPHOCYTES # BLD AUTO: 0.47 X10*3/UL (ref 1.2–4.8)
LYMPHOCYTES NFR BLD AUTO: 8.5 %
MCH RBC QN AUTO: 29.7 PG (ref 26–34)
MCHC RBC AUTO-ENTMCNC: 35.5 G/DL (ref 32–36)
MCV RBC AUTO: 83 FL (ref 80–100)
MONOCYTES # BLD AUTO: 0.18 X10*3/UL (ref 0.1–1)
MONOCYTES NFR BLD AUTO: 3.2 %
NEUTROPHILS # BLD AUTO: 4.87 X10*3/UL (ref 1.2–7.7)
NEUTROPHILS NFR BLD AUTO: 87.6 %
NRBC BLD-RTO: 0 /100 WBCS (ref 0–0)
PLATELET # BLD AUTO: 364 X10*3/UL (ref 150–450)
POTASSIUM SERPL-SCNC: 4.2 MMOL/L (ref 3.4–5.1)
RBC # BLD AUTO: 4.89 X10*6/UL (ref 4–5.2)
SODIUM SERPL-SCNC: 127 MMOL/L (ref 133–145)
WBC # BLD AUTO: 5.6 X10*3/UL (ref 4.4–11.3)

## 2024-02-07 PROCEDURE — 94664 DEMO&/EVAL PT USE INHALER: CPT

## 2024-02-07 PROCEDURE — 80048 BASIC METABOLIC PNL TOTAL CA: CPT | Performed by: INTERNAL MEDICINE

## 2024-02-07 PROCEDURE — 2500000002 HC RX 250 W HCPCS SELF ADMINISTERED DRUGS (ALT 637 FOR MEDICARE OP, ALT 636 FOR OP/ED): Performed by: INTERNAL MEDICINE

## 2024-02-07 PROCEDURE — 97116 GAIT TRAINING THERAPY: CPT | Mod: GP

## 2024-02-07 PROCEDURE — 36415 COLL VENOUS BLD VENIPUNCTURE: CPT | Performed by: INTERNAL MEDICINE

## 2024-02-07 PROCEDURE — 94640 AIRWAY INHALATION TREATMENT: CPT

## 2024-02-07 PROCEDURE — 1200000002 HC GENERAL ROOM WITH TELEMETRY DAILY

## 2024-02-07 PROCEDURE — 85025 COMPLETE CBC W/AUTO DIFF WBC: CPT | Performed by: INTERNAL MEDICINE

## 2024-02-07 PROCEDURE — 2500000004 HC RX 250 GENERAL PHARMACY W/ HCPCS (ALT 636 FOR OP/ED): Performed by: INTERNAL MEDICINE

## 2024-02-07 PROCEDURE — 2500000001 HC RX 250 WO HCPCS SELF ADMINISTERED DRUGS (ALT 637 FOR MEDICARE OP): Performed by: INTERNAL MEDICINE

## 2024-02-07 PROCEDURE — 9420000001 HC RT PATIENT EDUCATION 5 MIN

## 2024-02-07 PROCEDURE — 94760 N-INVAS EAR/PLS OXIMETRY 1: CPT

## 2024-02-07 PROCEDURE — 97110 THERAPEUTIC EXERCISES: CPT | Mod: GP

## 2024-02-07 RX ADMIN — GUAIFENESIN 600 MG: 600 TABLET ORAL at 08:16

## 2024-02-07 RX ADMIN — AMOXICILLIN AND CLAVULANATE POTASSIUM 1 TABLET: 875; 125 TABLET, FILM COATED ORAL at 20:39

## 2024-02-07 RX ADMIN — GUAIFENESIN SYRUP AND DEXTROMETHORPHAN 5 ML: 100; 10 SYRUP ORAL at 06:23

## 2024-02-07 RX ADMIN — ALBUTEROL SULFATE 2.5 MG: 2.5 SOLUTION RESPIRATORY (INHALATION) at 21:25

## 2024-02-07 RX ADMIN — ACETAMINOPHEN 650 MG: 325 TABLET ORAL at 06:23

## 2024-02-07 RX ADMIN — HEPARIN SODIUM 5000 UNITS: 5000 INJECTION, SOLUTION INTRAVENOUS; SUBCUTANEOUS at 21:19

## 2024-02-07 RX ADMIN — ONDANSETRON 4 MG: 2 INJECTION INTRAMUSCULAR; INTRAVENOUS at 07:32

## 2024-02-07 RX ADMIN — LEVOTHYROXINE SODIUM 125 MCG: 0.12 TABLET ORAL at 06:15

## 2024-02-07 RX ADMIN — LORATADINE 10 MG: 10 TABLET ORAL at 08:18

## 2024-02-07 RX ADMIN — PANTOPRAZOLE SODIUM 40 MG: 40 TABLET, DELAYED RELEASE ORAL at 08:19

## 2024-02-07 RX ADMIN — AMOXICILLIN AND CLAVULANATE POTASSIUM 1 TABLET: 875; 125 TABLET, FILM COATED ORAL at 08:17

## 2024-02-07 RX ADMIN — DEXAMETHASONE 6 MG: 4 TABLET ORAL at 08:17

## 2024-02-07 RX ADMIN — Medication 5 MG: at 20:39

## 2024-02-07 RX ADMIN — ALBUTEROL SULFATE 2.5 MG: 2.5 SOLUTION RESPIRATORY (INHALATION) at 09:37

## 2024-02-07 RX ADMIN — HEPARIN SODIUM 5000 UNITS: 5000 INJECTION, SOLUTION INTRAVENOUS; SUBCUTANEOUS at 14:29

## 2024-02-07 RX ADMIN — HEPARIN SODIUM 5000 UNITS: 5000 INJECTION, SOLUTION INTRAVENOUS; SUBCUTANEOUS at 06:15

## 2024-02-07 RX ADMIN — ALBUTEROL SULFATE 2.5 MG: 2.5 SOLUTION RESPIRATORY (INHALATION) at 15:57

## 2024-02-07 RX ADMIN — FLUTICASONE PROPIONATE 1 SPRAY: 50 SPRAY, METERED NASAL at 08:23

## 2024-02-07 SDOH — SOCIAL STABILITY: SOCIAL INSECURITY: WITHIN THE LAST YEAR, HAVE YOU BEEN HUMILIATED OR EMOTIONALLY ABUSED IN OTHER WAYS BY YOUR PARTNER OR EX-PARTNER?: NO

## 2024-02-07 SDOH — ECONOMIC STABILITY: FOOD INSECURITY: WITHIN THE PAST 12 MONTHS, YOU WORRIED THAT YOUR FOOD WOULD RUN OUT BEFORE YOU GOT MONEY TO BUY MORE.: NEVER TRUE

## 2024-02-07 SDOH — HEALTH STABILITY: PHYSICAL HEALTH: ON AVERAGE, HOW MANY DAYS PER WEEK DO YOU ENGAGE IN MODERATE TO STRENUOUS EXERCISE (LIKE A BRISK WALK)?: 0 DAYS

## 2024-02-07 SDOH — SOCIAL STABILITY: SOCIAL NETWORK: ARE YOU MARRIED, WIDOWED, DIVORCED, SEPARATED, NEVER MARRIED, OR LIVING WITH A PARTNER?: MARRIED

## 2024-02-07 SDOH — SOCIAL STABILITY: SOCIAL INSECURITY: WITHIN THE LAST YEAR, HAVE YOU BEEN AFRAID OF YOUR PARTNER OR EX-PARTNER?: NO

## 2024-02-07 SDOH — SOCIAL STABILITY: SOCIAL NETWORK: HOW OFTEN DO YOU ATTENT MEETINGS OF THE CLUB OR ORGANIZATION YOU BELONG TO?: 1 TO 4 TIMES PER YEAR

## 2024-02-07 SDOH — ECONOMIC STABILITY: FOOD INSECURITY: WITHIN THE PAST 12 MONTHS, THE FOOD YOU BOUGHT JUST DIDN'T LAST AND YOU DIDN'T HAVE MONEY TO GET MORE.: NEVER TRUE

## 2024-02-07 SDOH — ECONOMIC STABILITY: INCOME INSECURITY: HOW HARD IS IT FOR YOU TO PAY FOR THE VERY BASICS LIKE FOOD, HOUSING, MEDICAL CARE, AND HEATING?: NOT HARD AT ALL

## 2024-02-07 SDOH — ECONOMIC STABILITY: HOUSING INSECURITY: IN THE LAST 12 MONTHS, HOW MANY PLACES HAVE YOU LIVED?: 1

## 2024-02-07 SDOH — ECONOMIC STABILITY: INCOME INSECURITY: IN THE LAST 12 MONTHS, WAS THERE A TIME WHEN YOU WERE NOT ABLE TO PAY THE MORTGAGE OR RENT ON TIME?: NO

## 2024-02-07 SDOH — HEALTH STABILITY: MENTAL HEALTH: HOW MANY STANDARD DRINKS CONTAINING ALCOHOL DO YOU HAVE ON A TYPICAL DAY?: PATIENT DOES NOT DRINK

## 2024-02-07 SDOH — HEALTH STABILITY: MENTAL HEALTH: HOW OFTEN DO YOU HAVE 6 OR MORE DRINKS ON ONE OCCASION?: NEVER

## 2024-02-07 SDOH — SOCIAL STABILITY: SOCIAL NETWORK: HOW OFTEN DO YOU ATTEND CHURCH OR RELIGIOUS SERVICES?: NEVER

## 2024-02-07 SDOH — HEALTH STABILITY: MENTAL HEALTH: HOW OFTEN DO YOU HAVE A DRINK CONTAINING ALCOHOL?: NEVER

## 2024-02-07 SDOH — SOCIAL STABILITY: SOCIAL NETWORK: HOW OFTEN DO YOU GET TOGETHER WITH FRIENDS OR RELATIVES?: MORE THAN THREE TIMES A WEEK

## 2024-02-07 SDOH — ECONOMIC STABILITY: INCOME INSECURITY: IN THE PAST 12 MONTHS, HAS THE ELECTRIC, GAS, OIL, OR WATER COMPANY THREATENED TO SHUT OFF SERVICE IN YOUR HOME?: NO

## 2024-02-07 SDOH — HEALTH STABILITY: PHYSICAL HEALTH: ON AVERAGE, HOW MANY MINUTES DO YOU ENGAGE IN EXERCISE AT THIS LEVEL?: 0 MIN

## 2024-02-07 ASSESSMENT — PAIN SCALES - GENERAL
PAINLEVEL_OUTOF10: 3
PAINLEVEL_OUTOF10: 2
PAINLEVEL_OUTOF10: 0 - NO PAIN
PAINLEVEL_OUTOF10: 0 - NO PAIN

## 2024-02-07 ASSESSMENT — COGNITIVE AND FUNCTIONAL STATUS - GENERAL
CLIMB 3 TO 5 STEPS WITH RAILING: A LITTLE
WALKING IN HOSPITAL ROOM: A LITTLE
MOVING TO AND FROM BED TO CHAIR: A LITTLE
STANDING UP FROM CHAIR USING ARMS: A LITTLE
MOBILITY SCORE: 20
DAILY ACTIVITIY SCORE: 24
MOBILITY SCORE: 24

## 2024-02-07 ASSESSMENT — LIFESTYLE VARIABLES
SKIP TO QUESTIONS 9-10: 1
AUDIT-C TOTAL SCORE: 0

## 2024-02-07 ASSESSMENT — PAIN DESCRIPTION - LOCATION: LOCATION: HEAD

## 2024-02-07 ASSESSMENT — PAIN - FUNCTIONAL ASSESSMENT
PAIN_FUNCTIONAL_ASSESSMENT: 0-10

## 2024-02-07 NOTE — CARE PLAN
The patient's goals for the shift include      The clinical goals for the shift include safety    Problem: Pain - Adult  Goal: Verbalizes/displays adequate comfort level or baseline comfort level  Outcome: Progressing     Problem: Safety - Adult  Goal: Free from fall injury  Outcome: Progressing     Problem: Fall/Injury  Goal: Not fall by end of shift  Outcome: Progressing  Goal: Be free from injury by end of the shift  Outcome: Progressing  Goal: Verbalize understanding of personal risk factors for fall in the hospital  Outcome: Progressing  Goal: Verbalize understanding of risk factor reduction measures to prevent injury from fall in the home  Outcome: Progressing     Problem: Pain  Goal: Takes deep breaths with improved pain control throughout the shift  Outcome: Progressing  Goal: Turns in bed with improved pain control throughout the shift  Outcome: Progressing  Goal: Walks with improved pain control throughout the shift  Outcome: Progressing      PAST MEDICAL HISTORY:  No pertinent past medical history

## 2024-02-07 NOTE — PROGRESS NOTES
Physical Therapy    Physical Therapy Treatment    Patient Name: Ade Fisher  MRN: [id number]  Today's Date: 2/7/2024  Time Calculation  Start Time: 1509  Stop Time: 1540  Time Calculation (min): 31 min    No further acute care PT needs at this time. HEP given to Pt. Pt reports interest in OP PT services upon DC for strengthening        Assessment/Plan   PT Assessment  Evaluation/Treatment Tolerance: Patient tolerated treatment well  End of Session Communication: Bedside nurse  End of Session Patient Position: Up in chair (Pt is ind with mobility. No chair alarm needed as there is a low risk for falls)  PT Plan  Inpatient/Swing Bed or Outpatient: Inpatient  PT Plan  Treatment/Interventions: Transfer training, Gait training, Balance training, Strengthening, Endurance training, Therapeutic exercise, Therapeutic activity, Home exercise program  PT Plan: Skilled PT  PT Frequency: Other (Comment) (DC)  PT Discharge Recommendations: Low intensity level of continued care (Pt reports she is interested in OP PT services at [facility] for strengthening)  PT Recommended Transfer Status: Independent  PT - OK to Discharge: Yes      General Visit Information:   PT  Visit  PT Received On: 02/07/24  Response to Previous Treatment: Patient with no complaints from previous session.  General  Family/Caregiver Present: No  Prior to Session Communication: Bedside nurse  Patient Position Received: Bed, 3 rail up  Preferred Learning Style: verbal  General Comment: Agreeable to PT follow up    Subjective   Precautions:  Precautions  Precautions Comment: c+  Vital Signs:  Vital Signs  SpO2: 96 % (RA)    Objective   Pain:  Pain Assessment  Pain Assessment: 0-10  Pain Score: 0 - No pain  Cognition:  Cognition  Overall Cognitive Status: Within Functional Limits    Activity Tolerance:  Activity Tolerance  Endurance: Decreased tolerance for upright activites  Rate of Perceived Exertion (RPE): 5/10 after long gait trial  Treatments:  Therapeutic  Exercise  Therapeutic Exercise Performed: Yes  Therapeutic Exercise Activity 2: Pt educated on and completes B ankle pumps x20, Knee Kicks x10, Seated Knee Marches x10, Resisted Hip Abd x10, Resisted Hip Add x10, and Glute sets x10    Bed Mobility  Bed Mobility: Yes  Bed Mobility 1  Bed Mobility 1: Supine to sitting  Level of Assistance 1: Independent    Ambulation/Gait Training  Ambulation/Gait Training Performed: Yes  Ambulation/Gait Training 1  Surface 1: Level tile  Device 1: No device  Assistance 1: Independent  Comments/Distance (ft) 1: ~300 (due to isolation Pt remains in room but is able to ambulate ~300' no AD ind before reaching 5/10 fatigue)  Transfers  Transfer: Yes  Transfer 1  Transfer From 1: Bed to  Transfer to 1: Stand  Technique 1: Sit to stand, Stand to sit  Transfer Device 1:  (no AD)  Transfer Level of Assistance 1: Independent  Trials/Comments 1: X5 TRIALS    Outcome Measures:  [facility] Basic Mobility  Turning from your back to your side while in a flat bed without using bedrails: None  Moving from lying on your back to sitting on the side of a flat bed without using bedrails: None  Moving to and from bed to chair (including a wheelchair): None  Standing up from a chair using your arms (e.g. wheelchair or bedside chair): None  To walk in hospital room: None  Climbing 3-5 steps with railing: None  Basic Mobility - Total Score: 24    Education Documentation  Handouts, taught by Gagan Simeon, PT at 2/7/2024  3:47 PM.  Learner: Patient  Readiness: Eager  Method: Explanation, Demonstration  Response: Verbalizes Understanding  Comment: safe mobility techniques, fall risk management, SPO2 monitoring with activity, endurance building, HEP    Precautions, taught by Gagan Simeon, PT at 2/7/2024  3:47 PM.  Learner: Patient  Readiness: Eager  Method: Explanation, Demonstration  Response: Verbalizes Understanding  Comment: safe mobility techniques, fall risk management, SPO2 monitoring with  activity, endurance building, HEP    Body Mechanics, taught by Gagan Simeon, PT at 2/7/2024  3:47 PM.  Learner: Patient  Readiness: Eager  Method: Explanation, Demonstration  Response: Verbalizes Understanding  Comment: safe mobility techniques, fall risk management, SPO2 monitoring with activity, endurance building, HEP    Home Exercise Program, taught by Gagan Simeon, PT at 2/7/2024  3:47 PM.  Learner: Patient  Readiness: Eager  Method: Explanation, Demonstration  Response: Verbalizes Understanding  Comment: safe mobility techniques, fall risk management, SPO2 monitoring with activity, endurance building, HEP    Mobility Training, taught by Gagan Simeon, PT at 2/7/2024  3:47 PM.  Learner: Patient  Readiness: Eager  Method: Explanation, Demonstration  Response: Verbalizes Understanding  Comment: safe mobility techniques, fall risk management, SPO2 monitoring with activity, endurance building, HEP    Education Comments  No comments found.        OP EDUCATION:       Encounter Problems       Encounter Problems (Active)       Pain - Adult             Encounter Problems (Resolved)       Balance       Standing Balance (Met)       Start:  02/06/24    Expected End:  02/20/24    Resolved:  02/07/24      Pt will demonstrate good static standing balance to promote safe participation with out of bed activity, transfers, and mobility              Mobility       Ambulation (Met)       Start:  02/06/24    Expected End:  02/20/24    Resolved:  02/07/24    Pt will ambulate 150' independent assist with no device to promote safe home mobility              Safety       Safe Mobility Techniques (Met)       Start:  02/06/24    Expected End:  02/20/24    Resolved:  02/07/24    Pt will correctly identify and demonstrate safe mobility techniques to reduce their risks for falls during their acute care stay               Transfers       Sit to stand (Met)       Start:  02/06/24    Expected End:  02/20/24    Resolved:   02/07/24    Pt will transfer sit to standing position with independent assist and no device to promote safe out of bed activity

## 2024-02-07 NOTE — PROGRESS NOTES
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

## 2024-02-07 NOTE — NURSING NOTE
Pt sitting up in the bed ambulates independently in room  requesting an aerosol treatment RT called  for tx

## 2024-02-07 NOTE — PROGRESS NOTES
"Ade Fisher is a 68 y.o. female on day 2 of admission presenting with COVID-19.    Subjective   TCSW engaged with pt via TCT to discuss pt's discharge plan. Pt states she, PT and OT were discussing the possibility of outpatient PTOT. Pt states she is in full agreement to complete  outpatient PTOT post discharge. Pt will require orders for the outpatient services. TCSW will inform Dr Fierro of the above information.        Objective     Physical Exam    Last Recorded Vitals  Blood pressure 118/55, pulse 97, temperature 36.7 °C (98.1 °F), temperature source Oral, resp. rate 17, height 1.575 m (5' 2\"), weight 76.1 kg (167 lb 12.3 oz), SpO2 92 %.  Intake/Output last 3 Shifts:  I/O last 3 completed shifts:  In: 200 (2.6 mL/kg) [P.O.:200]  Out: 1350 (17.7 mL/kg) [Urine:1350 (0.5 mL/kg/hr)]  Weight: 76.1 kg     Relevant Results                             Assessment/Plan   Principal Problem:    COVID-19               TALA Kuhn      "

## 2024-02-08 LAB
ALBUMIN SERPL-MCNC: 4.2 G/DL (ref 3.5–5)
ANION GAP SERPL CALC-SCNC: 12 MMOL/L
BUN SERPL-MCNC: 31 MG/DL (ref 8–25)
CALCIUM SERPL-MCNC: 10 MG/DL (ref 8.5–10.4)
CHLORIDE SERPL-SCNC: 94 MMOL/L (ref 97–107)
CO2 SERPL-SCNC: 22 MMOL/L (ref 24–31)
CREAT SERPL-MCNC: 0.7 MG/DL (ref 0.4–1.6)
EGFRCR SERPLBLD CKD-EPI 2021: >90 ML/MIN/1.73M*2
GLUCOSE SERPL-MCNC: 163 MG/DL (ref 65–99)
NT-PROBNP SERPL-MCNC: 64 PG/ML (ref 0–353)
PHOSPHATE SERPL-MCNC: 3.4 MG/DL (ref 2.5–4.5)
POTASSIUM SERPL-SCNC: 3.7 MMOL/L (ref 3.4–5.1)
SODIUM SERPL-SCNC: 128 MMOL/L (ref 133–145)

## 2024-02-08 PROCEDURE — 83880 ASSAY OF NATRIURETIC PEPTIDE: CPT | Performed by: INTERNAL MEDICINE

## 2024-02-08 PROCEDURE — 94640 AIRWAY INHALATION TREATMENT: CPT

## 2024-02-08 PROCEDURE — 99222 1ST HOSP IP/OBS MODERATE 55: CPT | Performed by: INTERNAL MEDICINE

## 2024-02-08 PROCEDURE — 80069 RENAL FUNCTION PANEL: CPT | Performed by: INTERNAL MEDICINE

## 2024-02-08 PROCEDURE — 97110 THERAPEUTIC EXERCISES: CPT | Mod: GO

## 2024-02-08 PROCEDURE — 36415 COLL VENOUS BLD VENIPUNCTURE: CPT | Performed by: INTERNAL MEDICINE

## 2024-02-08 PROCEDURE — 9420000001 HC RT PATIENT EDUCATION 5 MIN

## 2024-02-08 PROCEDURE — 2500000002 HC RX 250 W HCPCS SELF ADMINISTERED DRUGS (ALT 637 FOR MEDICARE OP, ALT 636 FOR OP/ED): Performed by: INTERNAL MEDICINE

## 2024-02-08 PROCEDURE — 1200000002 HC GENERAL ROOM WITH TELEMETRY DAILY

## 2024-02-08 PROCEDURE — 2500000001 HC RX 250 WO HCPCS SELF ADMINISTERED DRUGS (ALT 637 FOR MEDICARE OP): Performed by: INTERNAL MEDICINE

## 2024-02-08 PROCEDURE — 2500000004 HC RX 250 GENERAL PHARMACY W/ HCPCS (ALT 636 FOR OP/ED): Performed by: INTERNAL MEDICINE

## 2024-02-08 RX ORDER — AMLODIPINE BESYLATE 5 MG/1
5 TABLET ORAL DAILY
Status: DISCONTINUED | OUTPATIENT
Start: 2024-02-08 | End: 2024-02-08

## 2024-02-08 RX ADMIN — ALBUTEROL SULFATE 2.5 MG: 2.5 SOLUTION RESPIRATORY (INHALATION) at 07:31

## 2024-02-08 RX ADMIN — HEPARIN SODIUM 5000 UNITS: 5000 INJECTION, SOLUTION INTRAVENOUS; SUBCUTANEOUS at 21:22

## 2024-02-08 RX ADMIN — AMOXICILLIN AND CLAVULANATE POTASSIUM 1 TABLET: 875; 125 TABLET, FILM COATED ORAL at 08:58

## 2024-02-08 RX ADMIN — Medication 5 MG: at 21:22

## 2024-02-08 RX ADMIN — LORATADINE 10 MG: 10 TABLET ORAL at 08:58

## 2024-02-08 RX ADMIN — AMOXICILLIN AND CLAVULANATE POTASSIUM 1 TABLET: 875; 125 TABLET, FILM COATED ORAL at 21:22

## 2024-02-08 RX ADMIN — ACETAMINOPHEN 650 MG: 325 TABLET ORAL at 05:50

## 2024-02-08 RX ADMIN — LEVOTHYROXINE SODIUM 125 MCG: 0.12 TABLET ORAL at 05:42

## 2024-02-08 RX ADMIN — DEXAMETHASONE 6 MG: 4 TABLET ORAL at 08:58

## 2024-02-08 RX ADMIN — POLYETHYLENE GLYCOL 3350 17 G: 17 POWDER, FOR SOLUTION ORAL at 05:59

## 2024-02-08 RX ADMIN — HEPARIN SODIUM 5000 UNITS: 5000 INJECTION, SOLUTION INTRAVENOUS; SUBCUTANEOUS at 05:42

## 2024-02-08 RX ADMIN — PANTOPRAZOLE SODIUM 40 MG: 40 TABLET, DELAYED RELEASE ORAL at 08:58

## 2024-02-08 RX ADMIN — ALBUTEROL SULFATE 2.5 MG: 2.5 SOLUTION RESPIRATORY (INHALATION) at 21:01

## 2024-02-08 ASSESSMENT — COGNITIVE AND FUNCTIONAL STATUS - GENERAL
DAILY ACTIVITIY SCORE: 24
CLIMB 3 TO 5 STEPS WITH RAILING: A LITTLE
DAILY ACTIVITIY SCORE: 24
MOBILITY SCORE: 23

## 2024-02-08 ASSESSMENT — PAIN SCALES - GENERAL
PAINLEVEL_OUTOF10: 0 - NO PAIN
PAINLEVEL_OUTOF10: 2
PAINLEVEL_OUTOF10: 0 - NO PAIN

## 2024-02-08 ASSESSMENT — PAIN - FUNCTIONAL ASSESSMENT
PAIN_FUNCTIONAL_ASSESSMENT: 0-10

## 2024-02-08 ASSESSMENT — PAIN DESCRIPTION - LOCATION: LOCATION: HEAD

## 2024-02-08 ASSESSMENT — PAIN SCALES - WONG BAKER: WONGBAKER_NUMERICALRESPONSE: NO HURT

## 2024-02-08 NOTE — PROGRESS NOTES
Ade Fisher is a 68 y.o. female on day 2 of admission presenting with COVID-19.    Subjective   Patient was seen and examined.  Lying in the bed.  Patient is feeling better.  Her headache is better.  Patient is complaining of palpitations       Objective     Physical Exam  HEENT:  Head externally atraumatic, no pallor, no icterus, extraocular movements intact, pupils reacting to light, oral mucosa moist and throat clear.  Sinus tenderness better.  Neck:  Supple, no JVP, no palpable adenopathy or thyromegaly.  No carotid bruit.  Chest:  Clear to auscultation and resonant.  Heart:  Regular rate and rhythm, no murmur or gallop could be appreciated.  Abdomen:  Soft, nontender, bowel sounds present, normoactive, no palpable hepatosplenomegaly.  Extremities:  No edema, pulses present, no cyanosis or clubbing.  CNS:  Patient alert, oriented to time, place and person.  Power 5/5 all over and deep tendon reflexes symmetrical, cranial nerves 2-12 grossly intact.  Skin:  No active rash.  Musculoskeletal:  No joint swelling or erythema, range of movement normal.  Last Recorded Vitals  Heart Rate:  [73-97]   Temp:  [36.5 °C (97.7 °F)-36.9 °C (98.4 °F)]   Resp:  [16-18]   BP: (109-142)/(55-84)   SpO2:  [90 %-96 %]     Intake/Output last 3 Shifts:  I/O last 3 completed shifts:  In: 840 (11 mL/kg) [P.O.:840]  Out: 1300 (17.1 mL/kg) [Urine:1300 (0.5 mL/kg/hr)]  Weight: 76.1 kg     Relevant Results  No results found for the last 90 days.    Results for orders placed or performed during the hospital encounter of 02/05/24 (from the past 24 hour(s))   Basic Metabolic Panel   Result Value Ref Range    Glucose 134 (H) 65 - 99 mg/dL    Sodium 127 (L) 133 - 145 mmol/L    Potassium 4.2 3.4 - 5.1 mmol/L    Chloride 93 (L) 97 - 107 mmol/L    Bicarbonate 21 (L) 24 - 31 mmol/L    Urea Nitrogen 15 8 - 25 mg/dL    Creatinine 0.60 0.40 - 1.60 mg/dL    eGFR >90 >60 mL/min/1.73m*2    Calcium 10.0 8.5 - 10.4 mg/dL    Anion Gap 13 <=19 mmol/L   CBC  and Auto Differential   Result Value Ref Range    WBC 5.6 4.4 - 11.3 x10*3/uL    nRBC 0.0 0.0 - 0.0 /100 WBCs    RBC 4.89 4.00 - 5.20 x10*6/uL    Hemoglobin 14.5 12.0 - 16.0 g/dL    Hematocrit 40.8 36.0 - 46.0 %    MCV 83 80 - 100 fL    MCH 29.7 26.0 - 34.0 pg    MCHC 35.5 32.0 - 36.0 g/dL    RDW 12.1 11.5 - 14.5 %    Platelets 364 150 - 450 x10*3/uL    Neutrophils % 87.6 40.0 - 80.0 %    Immature Granulocytes %, Automated 0.7 0.0 - 0.9 %    Lymphocytes % 8.5 13.0 - 44.0 %    Monocytes % 3.2 2.0 - 10.0 %    Eosinophils % 0.0 0.0 - 6.0 %    Basophils % 0.0 0.0 - 2.0 %    Neutrophils Absolute 4.87 1.20 - 7.70 x10*3/uL    Immature Granulocytes Absolute, Automated 0.04 0.00 - 0.70 x10*3/uL    Lymphocytes Absolute 0.47 (L) 1.20 - 4.80 x10*3/uL    Monocytes Absolute 0.18 0.10 - 1.00 x10*3/uL    Eosinophils Absolute 0.00 0.00 - 0.70 x10*3/uL    Basophils Absolute 0.00 0.00 - 0.10 x10*3/uL        Current Facility-Administered Medications:     acetaminophen (Tylenol) tablet 650 mg, 650 mg, oral, q4h PRN, 650 mg at 02/07/24 0623 **OR** acetaminophen (Tylenol) oral liquid 650 mg, 650 mg, oral, q4h PRN **OR** acetaminophen (Tylenol) suppository 650 mg, 650 mg, rectal, q4h PRN, Ben Fierro MD    albuterol 2.5 mg /3 mL (0.083 %) nebulizer solution 2.5 mg, 2.5 mg, nebulization, q4h PRN, Ben Fierro MD    albuterol 2.5 mg /3 mL (0.083 %) nebulizer solution 2.5 mg, 2.5 mg, nebulization, q2h PRN, Ben Fierro MD, 2.5 mg at 02/07/24 2125    amoxicillin-pot clavulanate (Augmentin) 875-125 mg per tablet 1 tablet, 1 tablet, oral, q12h ZAC, Ben Fierro MD, 1 tablet at 02/07/24 2039    benzocaine-menthol (Cepastat Sore Throat) 15-3.6 mg lozenge 1 lozenge, 1 lozenge, Mouth/Throat, q2h PRN, Ben Fierro MD    dexAMETHasone (Decadron) tablet 6 mg, 6 mg, oral, Daily, Ben Fierro MD, 6 mg at 02/07/24 0817    dextromethorphan-guaifenesin (Robitussin DM)  mg/5 mL oral liquid 5 mL, 5 mL, oral,  q4h PRN, Ben Fierro MD, 5 mL at 02/07/24 0623    fluticasone (Flonase) nasal spray 1 spray, 1 spray, Each Nostril, Daily, Ben Fierro MD, 1 spray at 02/07/24 0823    [Held by provider] fluticasone furoate-vilanteroL (Breo Ellipta) 200-25 mcg/dose inhaler 1 puff, 1 puff, inhalation, Daily, Ben Fierro MD    guaiFENesin (Mucinex) 12 hr tablet 600 mg, 600 mg, oral, q12h PRN, Ben Fierro MD, 600 mg at 02/07/24 0816    guaiFENesin (Mucinex) 12 hr tablet 600 mg, 600 mg, oral, BID PRN, Ben Fierro MD    heparin (porcine) injection 5,000 Units, 5,000 Units, subcutaneous, q8h ZAC, Ben Fierro MD, 5,000 Units at 02/07/24 2119    levothyroxine (Synthroid, Levoxyl) tablet 125 mcg, 125 mcg, oral, Daily before breakfast, Ben Fierro MD, 125 mcg at 02/07/24 0615    loratadine (Claritin) tablet 10 mg, 10 mg, oral, Daily, Ben Fierro MD, 10 mg at 02/07/24 0818    melatonin tablet 5 mg, 5 mg, oral, Nightly, Ben Fierro MD, 5 mg at 02/07/24 2039    ondansetron (Zofran) injection 4 mg, 4 mg, intravenous, q6h PRN, Ben Fierro MD, 4 mg at 02/07/24 0732    pantoprazole (ProtoNix) EC tablet 40 mg, 40 mg, oral, Daily, Ben Fierro MD, 40 mg at 02/07/24 0819    polyethylene glycol (Glycolax, Miralax) packet 17 g, 17 g, oral, Daily PRN, Ben Fierro MD    traMADol (Ultram) tablet 50 mg, 50 mg, oral, q6h PRN, Ben Fierro MD, 50 mg at 02/06/24 2130    triamcinolone (Kenalog) 0.1 % cream, , Topical, BID, Ben Fierro MD, Given at 02/06/24 0958   Assessment/Plan   Principal Problem:    COVID-19  Hyponatremia  Sinusitis  Hypothyroidism COVID-19  COPD  Hypertension  Hypothyroidism  Headache  Nausea and vomiting.    Plan: Continue current medication.  Supportive care.  Physical therapy and Occupational Therapy, disposition BMP indicated.  Give aerosol treatment with limited Atrovent.  Monitor blood pressure.  Sodium is improving.  Sinus  tenderness  Pain has improved significantly.  Patient complaining of off-and-on palpitation.  Patient told me that she also had a abnormal EKG.  Consult cardiology      Ben Fierro MD

## 2024-02-08 NOTE — CONSULTS
Inpatient consult to Cardiology  Consult performed by: Kylie Espinoza MD  Consult ordered by: Ben Fierro MD  Reason for consult: Palpitations, shortness of breath      History Of Present Illness:    Ade Fisher is a 68 y.o. female presenting with shortness of breath patient was found to have COVID-19 infection.  I was asked to see her for tachycardia and palpitations.  Patient reports for the last few days having shortness of breath not feeling good cough.  Denies prior cardiac history.  Has been complaining of mild palpitations.  Denies nausea vomiting.    Review of systems.  10 point review of systems otherwise negative.     Last Recorded Vitals:  Vitals:    02/08/24 0026 02/08/24 0606 02/08/24 0731 02/08/24 1134   BP: 89/57 125/70  113/75   BP Location: Left arm Left arm  Right arm   Patient Position: Lying Sitting  Sitting   Pulse: 82 73  80   Resp: 18 18  17   Temp: 36.5 °C (97.7 °F) 36.5 °C (97.7 °F)  36.7 °C (98.1 °F)   TempSrc: Oral Oral  Oral   SpO2: 94% 93% 98% 97%   Weight:       Height:           Last Labs:  CBC - 2/7/2024:  4:21 AM  5.6 14.5 364    40.8      CMP - 2/8/2024:  9:32 AM  10.0 7.4 30 --- 0.6   3.4 4.2 29 98      PTT - No results in last year.  _   _ _     Troponin I, High Sensitivity   Date/Time Value Ref Range Status   01/11/2024 04:51 PM 6 0 - 34 ng/L Final     Hemoglobin A1C   Date/Time Value Ref Range Status   10/20/2020 08:50 AM 5.6 % Final     Comment:          Diagnosis of Diabetes-Adults   Non-Diabetic: < or = 5.6%   Increased risk for developing diabetes: 5.7-6.4%   Diagnostic of diabetes: > or = 6.5%  .       Monitoring of Diabetes                Age (y)     Therapeutic Goal (%)   Adults:          >18           <7.0   Pediatrics:    13-18           <7.5                   7-12           <8.0                   0- 6            7.5-8.5   American Diabetes Association. Diabetes Care 33(S1), Jan 2010.     06/20/2020 10:17 AM 5.7 % Final     Comment:          Diagnosis of  "Diabetes-Adults   Non-Diabetic: < or = 5.6%   Increased risk for developing diabetes: 5.7-6.4%   Diagnostic of diabetes: > or = 6.5%  .       Monitoring of Diabetes                Age (y)     Therapeutic Goal (%)   Adults:          >18           <7.0   Pediatrics:    13-18           <7.5                   7-12           <8.0                   0- 6            7.5-8.5   American Diabetes Association. Diabetes Care 33(S1), Jan 2010.       LDL Calculated   Date/Time Value Ref Range Status   02/05/2024 10:21  (H) 65 - 130 mg/dL Final   01/09/2024 09:44  (H) <=99 mg/dL Final     Comment:                                 Near   Borderline      AGE      Desirable  Optimal    High     High     Very High     0-19 Y     0 - 109     ---    110-129   >/= 130     ----    20-24 Y     0 - 119     ---    120-159   >/= 160     ----      >24 Y     0 -  99   100-129  130-159   160-189     >/=190     10/05/2023 09:01  140 - 190 mg/dL Final     Comment:                                 Near   Borderline      AGE      Desirable  Optimal    High     High     Very High     0-19 Y     0 - 109     ---    110-129   >/= 130     ----    20-24 Y     0 - 119     ---    120-159   >/= 160     ----      >24 Y     0 -  99   100-129  130-159   160-189     >/=190       VLDL   Date/Time Value Ref Range Status   01/09/2024 09:44 AM 29 0 - 40 mg/dL Final   10/05/2023 09:01 AM 21 0 - 40 mg/dL Final   03/02/2023 08:39 AM 22 0 - 40 mg/dL Final   09/30/2022 10:15 AM 26 0 - 40 mg/dL Final   12/20/2021 10:32 AM 28 0 - 40 mg/dL Final      Last I/O:  I/O last 3 completed shifts:  In: 840 (11 mL/kg) [P.O.:840]  Out: 1300 (17.1 mL/kg) [Urine:1300 (0.5 mL/kg/hr)]  Weight: 76.1 kg     Past Cardiology Tests (Last 3 Years):  EKG:  ECG 12 Lead 01/11/2024    Echo:  No results found for this or any previous visit from the past 1095 days.    Ejection Fractions:  No results found for: \"EF\"  Cath:  No results found for this or any previous visit from the past " 1095 days.    Stress Test:  No results found for this or any previous visit from the past 1095 days.    Cardiac Imaging:  No results found for this or any previous visit from the past 1095 days.      Past Medical History:  She has a past medical history of Pain in right hip (12/02/2021), Personal history of other diseases of the respiratory system, Personal history of other endocrine, nutritional and metabolic disease, Personal history of other specified conditions (03/30/2017), and Personal history of urinary (tract) infections (01/18/2022).    Past Surgical History:  She has a past surgical history that includes Tonsillectomy (04/12/2013) and Other surgical history (09/23/2013).      Social History:  She reports that she has never smoked. She has never used smokeless tobacco. She reports that she does not drink alcohol and does not use drugs.    Family History:  Family History   Problem Relation Name Age of Onset    Lung cancer Mother      Hypertension Brother          Allergies:  Sulfa (sulfonamide antibiotics)    Inpatient Medications:  Scheduled medications   Medication Dose Route Frequency    amLODIPine  5 mg oral Daily    amoxicillin-pot clavulanate  1 tablet oral q12h ZAC    dexAMETHasone  6 mg oral Daily    fluticasone  1 spray Each Nostril Daily    [Held by provider] fluticasone furoate-vilanteroL  1 puff inhalation Daily    heparin (porcine)  5,000 Units subcutaneous q8h ZAC    levothyroxine  125 mcg oral Daily before breakfast    loratadine  10 mg oral Daily    melatonin  5 mg oral Nightly    pantoprazole  40 mg oral Daily    triamcinolone   Topical BID     PRN medications   Medication    acetaminophen    Or    acetaminophen    Or    acetaminophen    albuterol    albuterol    benzocaine-menthol    dextromethorphan-guaifenesin    guaiFENesin    guaiFENesin    ondansetron    polyethylene glycol    traMADol     Continuous Medications   Medication Dose Last Rate     Outpatient Medications:  Current  Outpatient Medications   Medication Instructions    albuterol 90 mcg/actuation inhaler 2 puffs, inhalation, Every 4 hours PRN    albuterol 2.5 mg, nebulization, 4 times daily, USE 1 UNIT DOSE EVERY 4-6 HOURS AS NEEDED FOR WHEEZING .    budesonide-formoteroL (Symbicort) 160-4.5 mcg/actuation inhaler 2 puffs, inhalation, 2 times daily RT, RINSE MOUTH AFTER USE.    dexAMETHasone (DECADRON) 6 mg, oral, Daily    fluticasone (Cutivate) 0.05 % cream 1 Application, 2 times daily, Apply and gentaly massage into affected area(s) twice daily    fluticasone (Flonase) 50 mcg/actuation nasal spray 1 spray, Each Nostril, Daily    levocetirizine (XYZAL) 5 mg, oral, Daily    levothyroxine (SYNTHROID, LEVOXYL) 125 mcg, oral, Daily    olmesartan (BENICAR) 40 mg, oral, Daily    pantoprazole (PROTONIX) 40 mg, oral, Daily, Do not crush, chew, or split.       Physical Exam:  General: Patient is in no acute distress.  HEENT: atraumatic normocephalic.  Neck: is supple jugular venous pressure within normal limits no thyromegaly.  Cardiovascular regular rate and rhythm normal heart sounds no murmurs rubs or gallops.  Lungs: clear to auscultation bilaterally.  Abdomen: is soft nontender.  Extremities warm to touch no edema.  Neurologic examination: patient is awake alert oriented to person, place, date/time.  Psychiatric examination: patient has good insight denies feeling suicidal and depressed.  Pulses 2+ intact bilaterally     Assessment/Plan   1 shortness of breath.  Patient has shortness of breath likely secondary to COVID-19 infection.  Does not appear volume overloaded.  Patient NT-proBNP within normal limit.  Recommendations obtain limited 2D echo to assess ejection fraction.  Management of COVID-19 infection by primary team.    2.  Palpitations.  Patient complains of mild palpitations likely secondary to COVID-19 infection.  I do not see any sustained arrhythmia.  We may consider small dose beta-blocker just to help with the  palpitation sensation.  Await 2D echo.  Magnesium TSH ordered.  Will follow-up on the result.    Thank you for allowing to participate in her care  Peripheral IV 02/05/24 20 G Right Antecubital (Active)   Site Assessment Clean;Dry;Intact 02/08/24 0729   Dressing Status Clean;Dry 02/08/24 0729   Number of days: 3       Code Status:  Full Code        Kylie Espinoza MD

## 2024-02-08 NOTE — CARE PLAN
Problem: Pain - Adult  Goal: Verbalizes/displays adequate comfort level or baseline comfort level  Outcome: Progressing     Problem: Safety - Adult  Goal: Free from fall injury  Outcome: Progressing     Problem: Fall/Injury  Goal: Verbalize understanding of personal risk factors for fall in the hospital  Outcome: Progressing    Over the shift, the patient did  make progress toward the following goals.       The patient's goals for the shift include      The clinical goals for the shift include safety

## 2024-02-08 NOTE — PROGRESS NOTES
"Ade Fisher is a 68 y.o. female on day 3 of admission presenting with COVID-19.    Subjective   Pt has no new updates. Pt's discharge plan remains home with outpatient PTOT upon medical clearance.        Objective     Physical Exam    Last Recorded Vitals  Blood pressure 113/75, pulse 80, temperature 36.7 °C (98.1 °F), temperature source Oral, resp. rate 17, height 1.575 m (5' 2\"), weight 76.1 kg (167 lb 12.3 oz), SpO2 97 %.  Intake/Output last 3 Shifts:  I/O last 3 completed shifts:  In: 840 (11 mL/kg) [P.O.:840]  Out: 1300 (17.1 mL/kg) [Urine:1300 (0.5 mL/kg/hr)]  Weight: 76.1 kg     Relevant Results                             Assessment/Plan   Principal Problem:    COVID-19               TALA Kuhn      "

## 2024-02-08 NOTE — PROGRESS NOTES
"Occupational Therapy    Occupational Therapy Treatment    Name: Ade Fisher  MRN: [id number]  : 1955  Date: 24  Time Calculation  Start Time: 1507  Stop Time: 1531  Time Calculation (min): 24 min    Assessment:  End of Session Communication: Bedside nurse  End of Session Patient Position: Up in chair    Plan:  Treatment Interventions: ADL retraining, Functional transfer training, UE strengthening/ROM, Endurance training, Patient/family training, Equipment evaluation/education, Compensatory technique education  No Skilled OT: At baseline function  OT Discharge Recommendations: No further acute OT  OT Recommended Transfer Status: Independent  OT - OK to Discharge: Yes    Subjective   Previous Visit Info:  OT Last Visit  OT Received On: 24    General:  General  Reason for Referral: Decreased ADLs  Referred By: Dr. Fierro  Past Medical History Relevant to Rehab: hip pain, asthma, hyperlipidemia, UTI  Family/Caregiver Present: Yes  Caregiver Feedback: spouse present at start of session  Patient Position Received: Bed, 2 rail up  Preferred Learning Style: verbal  General Comment: Cleared by nsg, pt met in supine, agreeable to OT. Stating \"I'm hoping I get to go home tomorrow.\"    Precautions:  Medical Precautions: Infection precautions (covid iso, on room air)     Pain Assessment:  Pain Assessment  Pain Assessment: 0-10  Pain Score: 0 - No pain     Objective   Activities of Daily Living:    UE Bathing  UE Bathing Comments: Patient declining need for ADL completion, reports \"I showered yesterday\" without assistance and at IND level.  Endorses no home going concerns/needs.    Bed Mobility/Transfers: Bed Mobility  Bed Mobility: Yes  Bed Mobility 1  Bed Mobility 1: Supine to sitting  Level of Assistance 1: Modified independent  Bed Mobility Comments 1: HOB elevated, no assist    Transfers  Transfer: Yes  Transfer 1  Technique 1: Sit to stand, Stand to sit  Transfer Level of Assistance 1: " "Independent  Trials/Comments 1: STS from EOB and bedside chair, no assist, no device, IND level  Transfers 2  Technique 2:  (Functional mobility)  Transfer Level of Assistance 2: Independent  Trials/Comments 2: ambulates household distances within hospital room, no device, IND level. No LOB    Therapy/Activity: Therapeutic Exercise  Therapeutic Exercise Performed: Yes  Therapeutic Exercise Activity 1: OT provides patient with HEP and handouts to utilize at home upon DC. Patient verbalizes understanding of recommend set and rep ranges.    Therapeutic Activity  Therapeutic Activity Performed: Yes  Therapeutic Activity 1: Pt challenged to complete in item retrieval activity, retrieving several items from ground level during functional mobility to challenge dynamic standing balance and safety. Is able to retrieve 3/3 items without LOB, c/o mild dizziness with positional changes but resolves quickly with seated rest break  Therapeutic Activity 2: OT and pt collaborate on EC techniques to utilize at home upon DC as patient expresses she \"does a lot at home.\"  Verbalizes understanding of techniques    Outcome Measures:  [facility] Daily Activity  Putting on and taking off regular lower body clothing: None  Bathing (including washing, rinsing, drying): None  Putting on and taking off regular upper body clothing: None  Toileting, which includes using toilet, bedpan or urinal: None  Taking care of personal grooming such as brushing teeth: None  Eating Meals: None  Daily Activity - Total Score: 24      Education Documentation  Handouts, taught by Henry Buchanan OT at 2/8/2024  3:55 PM.  Learner: Patient  Readiness: Acceptance  Method: Explanation  Response: Verbalizes Understanding    Body Mechanics, taught by Henry Buchanan OT at 2/8/2024  3:55 PM.  Learner: Patient  Readiness: Acceptance  Method: Explanation  Response: Verbalizes Understanding    Precautions, taught by Henry Buchanan OT at 2/8/2024  3:55 PM.  Learner: " Patient  Readiness: Acceptance  Method: Explanation  Response: Verbalizes Understanding    Home Exercise Program, taught by Henry Buchanan OT at 2/8/2024  3:55 PM.  Learner: Patient  Readiness: Acceptance  Method: Explanation  Response: Verbalizes Understanding    ADL Training, taught by Henry Buchanan OT at 2/8/2024  3:55 PM.  Learner: Patient  Readiness: Acceptance  Method: Explanation  Response: Verbalizes Understanding    Education Comments  No comments found.      Goals:  Encounter Problems       Encounter Problems (Resolved)       OT Goals       ADLs (Met)       Start:  02/06/24    Expected End:  02/20/24    Resolved:  02/08/24    Patient will complete ADLS at MOD I using AE/compensatory techniques as needed.          Functional Mobility (Met)       Start:  02/06/24    Expected End:  02/20/24    Resolved:  02/08/24    Patient will complete household distance mobility at MOD I using LRD.          Activity Tolerance (Met)       Start:  02/06/24    Expected End:  02/20/24    Resolved:  02/08/24    Patient will demonstrate improved activity tolerance AEB completing functional tasks for >/= 15 minutes while remaining hemodynamically stable.

## 2024-02-08 NOTE — PROGRESS NOTES
Ade Fisher is a 68 y.o. female on day 3 of admission presenting with COVID-19.    Subjective   The patient was seen and examined.  Lying in the bed.  Comfortable.  Did not look in acute distress.  Patient denies any headache or dizziness.  No nausea or vomiting.  Patient is complaining of palpitation.  Blood pressure is running high now.       Objective     Physical Exam  HEENT:  Head externally atraumatic, no pallor, no icterus, extraocular movements intact, pupils reacting to light, oral mucosa moist and throat clear.  Neck:  Supple, no JVP, no palpable adenopathy or thyromegaly.  No carotid bruit.  Chest:  Clear to auscultation and resonant.  Heart:  Regular rate and rhythm, no murmur or gallop could be appreciated.  Abdomen:  Soft, nontender, bowel sounds present, normoactive, no palpable hepatosplenomegaly.  Extremities:  No edema, pulses present, no cyanosis or clubbing.  CNS:  Patient alert, oriented to time, place and person.  Power 5/5 all over and deep tendon reflexes symmetrical, cranial nerves 2-12 grossly intact.  Skin:  No active rash.  Musculoskeletal:  No joint swelling or erythema, range of movement normal.  Last Recorded Vitals  Heart Rate:  [73-97]   Temp:  [36.5 °C (97.7 °F)-36.7 °C (98.1 °F)]   Resp:  [17-18]   BP: ()/(55-70)   SpO2:  [90 %-98 %]     Intake/Output last 3 Shifts:  I/O last 3 completed shifts:  In: 840 (11 mL/kg) [P.O.:840]  Out: 1300 (17.1 mL/kg) [Urine:1300 (0.5 mL/kg/hr)]  Weight: 76.1 kg     Relevant Results  No results found for the last 90 days.    Results for orders placed or performed during the hospital encounter of 02/05/24 (from the past 24 hour(s))   Renal Function Panel   Result Value Ref Range    Glucose 163 (H) 65 - 99 mg/dL    Sodium 128 (L) 133 - 145 mmol/L    Potassium 3.7 3.4 - 5.1 mmol/L    Chloride 94 (L) 97 - 107 mmol/L    Bicarbonate 22 (L) 24 - 31 mmol/L    Urea Nitrogen 31 (H) 8 - 25 mg/dL    Creatinine 0.70 0.40 - 1.60 mg/dL    eGFR >90 >60  mL/min/1.73m*2    Calcium 10.0 8.5 - 10.4 mg/dL    Phosphorus 3.4 2.5 - 4.5 mg/dL    Albumin 4.2 3.5 - 5.0 g/dL    Anion Gap 12 <=19 mmol/L        Current Facility-Administered Medications:     acetaminophen (Tylenol) tablet 650 mg, 650 mg, oral, q4h PRN, 650 mg at 02/08/24 0550 **OR** acetaminophen (Tylenol) oral liquid 650 mg, 650 mg, oral, q4h PRN **OR** acetaminophen (Tylenol) suppository 650 mg, 650 mg, rectal, q4h PRN, Ben Fierro MD    albuterol 2.5 mg /3 mL (0.083 %) nebulizer solution 2.5 mg, 2.5 mg, nebulization, q4h PRN, Ben Fierro MD    albuterol 2.5 mg /3 mL (0.083 %) nebulizer solution 2.5 mg, 2.5 mg, nebulization, q2h PRN, Ben Fierro MD, 2.5 mg at 02/08/24 0731    amoxicillin-pot clavulanate (Augmentin) 875-125 mg per tablet 1 tablet, 1 tablet, oral, q12h ZAC, Ben Fierro MD, 1 tablet at 02/08/24 0858    benzocaine-menthol (Cepastat Sore Throat) 15-3.6 mg lozenge 1 lozenge, 1 lozenge, Mouth/Throat, q2h PRN, Ben Fierro MD    dexAMETHasone (Decadron) tablet 6 mg, 6 mg, oral, Daily, Ben Fierro MD, 6 mg at 02/08/24 0858    dextromethorphan-guaifenesin (Robitussin DM)  mg/5 mL oral liquid 5 mL, 5 mL, oral, q4h PRN, Ben Fierro MD, 5 mL at 02/07/24 0623    fluticasone (Flonase) nasal spray 1 spray, 1 spray, Each Nostril, Daily, Ben Fierro MD, 1 spray at 02/07/24 0823    [Held by provider] fluticasone furoate-vilanteroL (Breo Ellipta) 200-25 mcg/dose inhaler 1 puff, 1 puff, inhalation, Daily, Ben Fierro MD    guaiFENesin (Mucinex) 12 hr tablet 600 mg, 600 mg, oral, q12h PRN, Ben Fierro MD, 600 mg at 02/07/24 0816    guaiFENesin (Mucinex) 12 hr tablet 600 mg, 600 mg, oral, BID PRN, Ben Fierro MD    heparin (porcine) injection 5,000 Units, 5,000 Units, subcutaneous, q8h ZAC, Ben Fierro MD, 5,000 Units at 02/08/24 0542    levothyroxine (Synthroid, Levoxyl) tablet 125 mcg, 125 mcg, oral, Daily before  breakfast, Ben Fierro MD, 125 mcg at 02/08/24 0542    loratadine (Claritin) tablet 10 mg, 10 mg, oral, Daily, Ben Fierro MD, 10 mg at 02/08/24 0858    melatonin tablet 5 mg, 5 mg, oral, Nightly, Ben Fierro MD, 5 mg at 02/07/24 2039    ondansetron (Zofran) injection 4 mg, 4 mg, intravenous, q6h PRN, Ben Fierro MD, 4 mg at 02/07/24 0732    pantoprazole (ProtoNix) EC tablet 40 mg, 40 mg, oral, Daily, Ben Fierro MD, 40 mg at 02/08/24 0858    polyethylene glycol (Glycolax, Miralax) packet 17 g, 17 g, oral, Daily PRN, Ben Fierro MD, 17 g at 02/08/24 0559    traMADol (Ultram) tablet 50 mg, 50 mg, oral, q6h PRN, Ben Fierro MD, 50 mg at 02/06/24 2130    triamcinolone (Kenalog) 0.1 % cream, , Topical, BID, Ben Fierro MD, Given at 02/06/24 0958   Assessment/Plan   Principal Problem:    COVID-19  Hyponatremia  Hypothyroidism  Sinusitis  COPD  Hypertension  Palpitations  Nausea and vomiting      Plan: Continue current meds.  Nausea and vomiting has resolved.  Pressures running high at this time.  Add Norvasc to the current medications.  Patient advised to be compliant with the fluid restrictions.  Cardiology consult is pending.  Sodium is still 128.  Possible discharge soon when sodium goes more than 130 and patient seen and cleared by cardiology and nephrology.  We will add Norvasc to current medications to control her blood pressure.         Ben Fierro MD

## 2024-02-09 ENCOUNTER — APPOINTMENT (OUTPATIENT)
Dept: CARDIOLOGY | Facility: HOSPITAL | Age: 69
DRG: 643 | End: 2024-02-09
Payer: COMMERCIAL

## 2024-02-09 VITALS
OXYGEN SATURATION: 98 % | HEIGHT: 62 IN | TEMPERATURE: 98.6 F | DIASTOLIC BLOOD PRESSURE: 77 MMHG | BODY MASS INDEX: 30.87 KG/M2 | RESPIRATION RATE: 18 BRPM | SYSTOLIC BLOOD PRESSURE: 118 MMHG | WEIGHT: 167.77 LBS | HEART RATE: 70 BPM

## 2024-02-09 LAB
ANION GAP SERPL CALC-SCNC: 10 MMOL/L
AORTIC VALVE PEAK VELOCITY: 1.37 M/S
AV PEAK GRADIENT: 7.5 MMHG
BUN SERPL-MCNC: 27 MG/DL (ref 8–25)
CALCIUM SERPL-MCNC: 9.6 MG/DL (ref 8.5–10.4)
CHLORIDE SERPL-SCNC: 98 MMOL/L (ref 97–107)
CO2 SERPL-SCNC: 23 MMOL/L (ref 24–31)
CREAT SERPL-MCNC: 0.6 MG/DL (ref 0.4–1.6)
EGFRCR SERPLBLD CKD-EPI 2021: >90 ML/MIN/1.73M*2
EJECTION FRACTION APICAL 4 CHAMBER: 57.9
GLUCOSE SERPL-MCNC: 106 MG/DL (ref 65–99)
LEFT VENTRICLE INTERNAL DIMENSION DIASTOLE: 4.08 CM (ref 3.5–6)
POTASSIUM SERPL-SCNC: 4.4 MMOL/L (ref 3.4–5.1)
RIGHT VENTRICLE PEAK SYSTOLIC PRESSURE: 23.3 MMHG
SODIUM SERPL-SCNC: 131 MMOL/L (ref 133–145)

## 2024-02-09 PROCEDURE — 82374 ASSAY BLOOD CARBON DIOXIDE: CPT | Performed by: INTERNAL MEDICINE

## 2024-02-09 PROCEDURE — 2500000001 HC RX 250 WO HCPCS SELF ADMINISTERED DRUGS (ALT 637 FOR MEDICARE OP): Performed by: INTERNAL MEDICINE

## 2024-02-09 PROCEDURE — 36415 COLL VENOUS BLD VENIPUNCTURE: CPT | Performed by: INTERNAL MEDICINE

## 2024-02-09 PROCEDURE — 93308 TTE F-UP OR LMTD: CPT | Performed by: INTERNAL MEDICINE

## 2024-02-09 PROCEDURE — 93325 DOPPLER ECHO COLOR FLOW MAPG: CPT

## 2024-02-09 PROCEDURE — 93321 DOPPLER ECHO F-UP/LMTD STD: CPT | Performed by: INTERNAL MEDICINE

## 2024-02-09 PROCEDURE — 2500000004 HC RX 250 GENERAL PHARMACY W/ HCPCS (ALT 636 FOR OP/ED): Performed by: INTERNAL MEDICINE

## 2024-02-09 PROCEDURE — 93325 DOPPLER ECHO COLOR FLOW MAPG: CPT | Performed by: INTERNAL MEDICINE

## 2024-02-09 PROCEDURE — 99232 SBSQ HOSP IP/OBS MODERATE 35: CPT | Performed by: INTERNAL MEDICINE

## 2024-02-09 RX ORDER — AMOXICILLIN AND CLAVULANATE POTASSIUM 875; 125 MG/1; MG/1
1 TABLET, FILM COATED ORAL EVERY 12 HOURS SCHEDULED
Qty: 10 TABLET | Refills: 0 | Status: SHIPPED | OUTPATIENT
Start: 2024-02-09 | End: 2024-02-14

## 2024-02-09 RX ADMIN — PANTOPRAZOLE SODIUM 40 MG: 40 TABLET, DELAYED RELEASE ORAL at 08:03

## 2024-02-09 RX ADMIN — HEPARIN SODIUM 5000 UNITS: 5000 INJECTION, SOLUTION INTRAVENOUS; SUBCUTANEOUS at 05:41

## 2024-02-09 RX ADMIN — DEXAMETHASONE 6 MG: 4 TABLET ORAL at 08:03

## 2024-02-09 RX ADMIN — AMOXICILLIN AND CLAVULANATE POTASSIUM 1 TABLET: 875; 125 TABLET, FILM COATED ORAL at 08:03

## 2024-02-09 RX ADMIN — POLYETHYLENE GLYCOL 3350 17 G: 17 POWDER, FOR SOLUTION ORAL at 08:03

## 2024-02-09 RX ADMIN — LEVOTHYROXINE SODIUM 125 MCG: 0.12 TABLET ORAL at 05:41

## 2024-02-09 RX ADMIN — LORATADINE 10 MG: 10 TABLET ORAL at 08:03

## 2024-02-09 ASSESSMENT — PAIN SCALES - GENERAL: PAINLEVEL_OUTOF10: 0 - NO PAIN

## 2024-02-09 ASSESSMENT — COGNITIVE AND FUNCTIONAL STATUS - GENERAL
DAILY ACTIVITIY SCORE: 24
MOBILITY SCORE: 23
CLIMB 3 TO 5 STEPS WITH RAILING: A LITTLE

## 2024-02-09 ASSESSMENT — PAIN SCALES - WONG BAKER: WONGBAKER_NUMERICALRESPONSE: NO HURT

## 2024-02-09 NOTE — PROGRESS NOTES
No acute issues noted, sodium is improving, continue oral fluid restriction, following along.    Amor Rocha MD

## 2024-02-09 NOTE — PROGRESS NOTES
"Ade Fisher is a 68 y.o. female on day 4 of admission presenting with COVID-19.    Subjective   Pt will have an Echo today and discharge home with no needs.        Objective     Physical Exam    Last Recorded Vitals  Blood pressure 121/69, pulse 74, temperature 37 °C (98.6 °F), temperature source Oral, resp. rate 18, height 1.575 m (5' 2\"), weight 76.1 kg (167 lb 12.3 oz), SpO2 98 %.  Intake/Output last 3 Shifts:  I/O last 3 completed shifts:  In: - (0 mL/kg)   Out: 600 (7.9 mL/kg) [Urine:600 (0.2 mL/kg/hr)]  Weight: 76.1 kg     Relevant Results                             Assessment/Plan   Principal Problem:    COVID-19               TALA Kuhn      "

## 2024-02-09 NOTE — PROGRESS NOTES
The sodium continues to improve appropriately now up to 131, continue oral fluid restriction, following along.    Amor Rocha MD

## 2024-02-09 NOTE — PROGRESS NOTES
Ade Fisher is a 68 y.o. female on day 4 of admission presenting with COVID-19.    Subjective   Patient was seen and examined.  Lying in the bed.  Comfortable.  Did not look in acute distress.  Patient denies any headache or dizziness.       Objective     Physical Exam  HEENT:  Head externally atraumatic, no pallor, no icterus, extraocular movements intact, pupils reacting to light, oral mucosa moist and throat clear.  Neck:  Supple, no JVP, no palpable adenopathy or thyromegaly.  No carotid bruit.  Chest:  Clear to auscultation and resonant.  Heart:  Regular rate and rhythm, no murmur or gallop could be appreciated.  Abdomen:  Soft, nontender, bowel sounds present, normoactive, no palpable hepatosplenomegaly.  Extremities:  No edema, pulses present, no cyanosis or clubbing.  CNS:  Patient alert, oriented to time, place and person.  Power 5/5 all over and deep tendon reflexes symmetrical, cranial nerves 2-12 grossly intact.  Skin:  No active rash.  Musculoskeletal:  No joint swelling or erythema, range of movement normal.    Last Recorded Vitals  Heart Rate:  [66-77]   Temp:  [36.5 °C (97.7 °F)-37 °C (98.6 °F)]   Resp:  [16-18]   BP: (106-121)/(69-77)   SpO2:  [94 %-98 %]     Intake/Output last 3 Shifts:  I/O last 3 completed shifts:  In: - (0 mL/kg)   Out: 600 (7.9 mL/kg) [Urine:600 (0.2 mL/kg/hr)]  Weight: 76.1 kg     Relevant Results  No results found for the last 90 days.    Results for orders placed or performed during the hospital encounter of 02/05/24 (from the past 24 hour(s))   Basic Metabolic Panel   Result Value Ref Range    Glucose 106 (H) 65 - 99 mg/dL    Sodium 131 (L) 133 - 145 mmol/L    Potassium 4.4 3.4 - 5.1 mmol/L    Chloride 98 97 - 107 mmol/L    Bicarbonate 23 (L) 24 - 31 mmol/L    Urea Nitrogen 27 (H) 8 - 25 mg/dL    Creatinine 0.60 0.40 - 1.60 mg/dL    eGFR >90 >60 mL/min/1.73m*2    Calcium 9.6 8.5 - 10.4 mg/dL    Anion Gap 10 <=19 mmol/L   Transthoracic Echo (TTE) Limited   Result Value  Ref Range    AV pk brittani 1.37 m/s    RVSP 23.3 mmHg    LVIDd 4.08 cm    AV pk grad 7.5 mmHg    LV A4C EF 57.9         Current Facility-Administered Medications:     acetaminophen (Tylenol) tablet 650 mg, 650 mg, oral, q4h PRN, 650 mg at 02/08/24 0550 **OR** acetaminophen (Tylenol) oral liquid 650 mg, 650 mg, oral, q4h PRN **OR** acetaminophen (Tylenol) suppository 650 mg, 650 mg, rectal, q4h PRN, Ben Fierro MD    albuterol 2.5 mg /3 mL (0.083 %) nebulizer solution 2.5 mg, 2.5 mg, nebulization, q4h PRN, Ben Fierro MD    albuterol 2.5 mg /3 mL (0.083 %) nebulizer solution 2.5 mg, 2.5 mg, nebulization, q2h PRN, Ben Fierro MD, 2.5 mg at 02/08/24 2101    amoxicillin-pot clavulanate (Augmentin) 875-125 mg per tablet 1 tablet, 1 tablet, oral, q12h ZAC, Ben Fierro MD, 1 tablet at 02/09/24 0803    benzocaine-menthol (Cepastat Sore Throat) 15-3.6 mg lozenge 1 lozenge, 1 lozenge, Mouth/Throat, q2h PRN, Ben Fierro MD    dexAMETHasone (Decadron) tablet 6 mg, 6 mg, oral, Daily, Ben Fierro MD, 6 mg at 02/09/24 0803    dextromethorphan-guaifenesin (Robitussin DM)  mg/5 mL oral liquid 5 mL, 5 mL, oral, q4h PRN, Ben Fierro MD, 5 mL at 02/07/24 0623    fluticasone (Flonase) nasal spray 1 spray, 1 spray, Each Nostril, Daily, Ben Fierro MD, 1 spray at 02/07/24 0823    [Held by provider] fluticasone furoate-vilanteroL (Breo Ellipta) 200-25 mcg/dose inhaler 1 puff, 1 puff, inhalation, Daily, Ben Fierro MD    guaiFENesin (Mucinex) 12 hr tablet 600 mg, 600 mg, oral, q12h PRN, Ben Fierro MD, 600 mg at 02/07/24 0816    guaiFENesin (Mucinex) 12 hr tablet 600 mg, 600 mg, oral, BID PRN, Ben Fierro MD    heparin (porcine) injection 5,000 Units, 5,000 Units, subcutaneous, q8h ZAC, Ben Fierro MD, 5,000 Units at 02/09/24 0541    levothyroxine (Synthroid, Levoxyl) tablet 125 mcg, 125 mcg, oral, Daily before breakfast, Ben Fierro,  MD, 125 mcg at 02/09/24 0541    loratadine (Claritin) tablet 10 mg, 10 mg, oral, Daily, Ben Fierro MD, 10 mg at 02/09/24 0803    melatonin tablet 5 mg, 5 mg, oral, Nightly, Ben Fierro MD, 5 mg at 02/08/24 2122    ondansetron (Zofran) injection 4 mg, 4 mg, intravenous, q6h PRN, Ben Fierro MD, 4 mg at 02/07/24 0732    pantoprazole (ProtoNix) EC tablet 40 mg, 40 mg, oral, Daily, Ben Fierro MD, 40 mg at 02/09/24 0803    perflutren lipid microspheres (Definity) injection 0.5-10 mL of dilution, 0.5-10 mL of dilution, intravenous, Once in imaging, Kylie Espinoza MD    perflutren protein A microsphere (Optison) injection 0.5 mL, 0.5 mL, intravenous, Once in imaging, Kylie Espinoza MD    polyethylene glycol (Glycolax, Miralax) packet 17 g, 17 g, oral, Daily PRN, Ben Fierro MD, 17 g at 02/09/24 0803    sulfur hexafluoride microsphr (Lumason) injection 24.28 mg, 2 mL, intravenous, Once in imaging, Kylie Espinoza MD    traMADol (Ultram) tablet 50 mg, 50 mg, oral, q6h PRN, Ben Fierro MD, 50 mg at 02/06/24 2130    triamcinolone (Kenalog) 0.1 % cream, , Topical, BID, Ben Fierro MD, Given at 02/06/24 0958   Assessment/Plan   Principal Problem:    COVID-19  Hyponatremia  Hypertension  Sinusitis    Plan: Continue current medication.  Supportive care.  Physical therapy for therapy continue antibiotics.  Patient can be discharged home on oral antibiotics.    Ben Fierro MD

## 2024-02-09 NOTE — DISCHARGE SUMMARY
Discharge Diagnosis  COVID-19    Issues Requiring Follow-Up  Sinusitis  COVID-19  Hyponatremia  Hypertension   discharge Meds     Your medication list        START taking these medications        Instructions Last Dose Given Next Dose Due   amoxicillin-pot clavulanate 875-125 mg tablet  Commonly known as: Augmentin      Take 1 tablet by mouth every 12 hours for 5 days.              CHANGE how you take these medications        Instructions Last Dose Given Next Dose Due   pantoprazole 40 mg EC tablet  Commonly known as: ProtoNix  What changed:   when to take this  reasons to take this      Take 1 tablet (40 mg) by mouth once daily. Do not crush, chew, or split.              CONTINUE taking these medications        Instructions Last Dose Given Next Dose Due   albuterol 2.5 mg /3 mL (0.083 %) nebulizer solution      Take 3 mL (2.5 mg) by nebulization 4 times a day. USE 1 UNIT DOSE EVERY 4-6 HOURS AS NEEDED FOR WHEEZING .       albuterol 90 mcg/actuation inhaler      Inhale 2 puffs every 4 hours if needed for wheezing or shortness of breath.       budesonide-formoteroL 160-4.5 mcg/actuation inhaler  Commonly known as: Symbicort      Inhale 2 puffs 2 times a day. RINSE MOUTH AFTER USE.       dexAMETHasone 6 mg tablet  Commonly known as: Decadron      Take 1 tablet (6 mg) by mouth once daily for 10 days.       fluticasone 0.05 % cream  Commonly known as: Cutivate      APPLY AND GENTLY MASSAGE INTO AFFECTED AREA(S) TWICE DAILY       fluticasone 50 mcg/actuation nasal spray  Commonly known as: Flonase      Administer 1 spray into each nostril once daily.       levocetirizine 5 mg tablet  Commonly known as: Xyzal      Take 1 tablet (5 mg) by mouth once daily.       levothyroxine 125 mcg tablet  Commonly known as: Synthroid, Levoxyl      Take 1 tablet (125 mcg) by mouth once daily.              STOP taking these medications      nirmatrelvir-ritonavir 300 mg (150 mg x 2)-100 mg tablet therapy pack  Commonly known as:  PAXLOVID        olmesartan 40 mg tablet  Commonly known as: BENIcar                  Where to Get Your Medications        These medications were sent to [id number] - [place], [place] - [id number] [facility] [address]      Phone: [phone]   amoxicillin-pot clavulanate 875-125 mg tablet         Test Results Pending At Discharge  Pending Labs       Order Current Status    Extra Urine Gray Tube Collected (02/05/24 2250)    Urinalysis with Reflex Culture and Microscopic In process            Hospital Course   patient was admitted with generalized weakness of cough and found to have acute hyponatremia.  Patient had a recent COVID infection.  Patient was continued to headache.  Patient had acute sinusitis.  Patient Was given aerosol treatment with albuterol and Atrovent.  Patient was put on a fluid restriction.  Telmisartan was discontinued.  Patient sodium was monitored closely.  Improved.  Patient is complaining of palpitations.  Patient seen with cardiology.  2D echo was done.  That came back normal.  Patient cleared by cardiology..  Patient is being discharged in stable condition    Pertinent Physical Exam At Time of Discharge  Physical Exam    Outpatient Follow-Up  Future Appointments   Date Time Provider Department Center   3/12/2024 10:30 AM Yolie Garcia MD MPH [id number]   9/17/2024  1:45 PM Liudmila Dickerson MD [facility]         Ben Fierro MD

## 2024-02-09 NOTE — CARE PLAN
The patient's goals for the shift include      The clinical goals for the shift include safety    Over the shift, the patient did not make progress toward the following goals. Barriers to progression include   Problem: Pain - Adult  Goal: Verbalizes/displays adequate comfort level or baseline comfort level  Outcome: Progressing     Problem: Safety - Adult  Goal: Free from fall injury  Outcome: Progressing     Problem: Discharge Planning  Goal: Discharge to home or other facility with appropriate resources  Outcome: Progressing     Problem: Chronic Conditions and Co-morbidities  Goal: Patient's chronic conditions and co-morbidity symptoms are monitored and maintained or improved  Outcome: Progressing     Problem: Fall/Injury  Goal: Not fall by end of shift  Outcome: Progressing  Goal: Be free from injury by end of the shift  Outcome: Progressing  Goal: Verbalize understanding of personal risk factors for fall in the hospital  Outcome: Progressing  Goal: Verbalize understanding of risk factor reduction measures to prevent injury from fall in the home  Outcome: Progressing     Problem: Pain  Goal: Takes deep breaths with improved pain control throughout the shift  Outcome: Progressing  Goal: Turns in bed with improved pain control throughout the shift  Outcome: Progressing  Goal: Walks with improved pain control throughout the shift  Outcome: Progressing     Problem: Respiratory  Goal: Minimal/no exertional discomfort or dyspnea this shift  Outcome: Progressing  Goal: No signs of respiratory distress (eg. Use of accessory muscles. Peds grunting)  Outcome: Progressing  Goal: Verbalize decreased shortness of breath this shift  Outcome: Progressing    Recommendations to address these barriers include

## 2024-02-09 NOTE — PROGRESS NOTES
Subjective Data:  Patient is doing better.  Denies having chest pain.  Shortness of breath has improved.    Overnight Events:    Limited overnight reviewed no events     Objective Data:  Last Recorded Vitals:  Vitals:    02/08/24 1549 02/08/24 1917 02/08/24 2308 02/09/24 0759   BP: 117/76 106/72 109/69 121/69   BP Location: Right arm Right arm Right arm Right arm   Patient Position: Lying Lying Lying Lying   Pulse: 74 76 66 74   Resp: 17 17 17 18   Temp: 36.6 °C (97.9 °F) 36.7 °C (98.1 °F) 36.5 °C (97.7 °F) 37 °C (98.6 °F)   TempSrc: Oral Oral Oral Oral   SpO2: 94% 97% 94% 98%   Weight:       Height:           Last Labs:  CBC - 2/7/2024:  4:21 AM  5.6 14.5 364    40.8      CMP - 2/9/2024:  4:45 AM  9.6 7.4 30 --- 0.6   3.4 4.2 29 98      PTT - No results in last year.  _   _ _     TROPHS   Date/Time Value Ref Range Status   01/11/2024 04:51 PM 6 0 - 34 ng/L Final     HGBA1C   Date/Time Value Ref Range Status   10/20/2020 08:50 AM 5.6 % Final     Comment:          Diagnosis of Diabetes-Adults   Non-Diabetic: < or = 5.6%   Increased risk for developing diabetes: 5.7-6.4%   Diagnostic of diabetes: > or = 6.5%  .       Monitoring of Diabetes                Age (y)     Therapeutic Goal (%)   Adults:          >18           <7.0   Pediatrics:    13-18           <7.5                   7-12           <8.0                   0- 6            7.5-8.5   American Diabetes Association. Diabetes Care 33(S1), Jan 2010.     06/20/2020 10:17 AM 5.7 % Final     Comment:          Diagnosis of Diabetes-Adults   Non-Diabetic: < or = 5.6%   Increased risk for developing diabetes: 5.7-6.4%   Diagnostic of diabetes: > or = 6.5%  .       Monitoring of Diabetes                Age (y)     Therapeutic Goal (%)   Adults:          >18           <7.0   Pediatrics:    13-18           <7.5                   7-12           <8.0                   0- 6            7.5-8.5   American Diabetes Association. Diabetes Care 33(S1), Jan 2010.       LDLCALC  "  Date/Time Value Ref Range Status   02/05/2024 10:21  65 - 130 mg/dL Final   01/09/2024 09:44  <=99 mg/dL Final     Comment:                                 Near   Borderline      AGE      Desirable  Optimal    High     High     Very High     0-19 Y     0 - 109     ---    110-129   >/= 130     ----    20-24 Y     0 - 119     ---    120-159   >/= 160     ----      >24 Y     0 -  99   100-129  130-159   160-189     >/=190     10/05/2023 09:01  140 - 190 mg/dL Final     Comment:                                 Near   Borderline      AGE      Desirable  Optimal    High     High     Very High     0-19 Y     0 - 109     ---    110-129   >/= 130     ----    20-24 Y     0 - 119     ---    120-159   >/= 160     ----      >24 Y     0 -  99   100-129  130-159   160-189     >/=190       VLDL   Date/Time Value Ref Range Status   01/09/2024 09:44 AM 29 0 - 40 mg/dL Final   10/05/2023 09:01 AM 21 0 - 40 mg/dL Final   03/02/2023 08:39 AM 22 0 - 40 mg/dL Final   09/30/2022 10:15 AM 26 0 - 40 mg/dL Final   12/20/2021 10:32 AM 28 0 - 40 mg/dL Final      Last I/O:  I/O last 3 completed shifts:  In: - (0 mL/kg)   Out: 600 (7.9 mL/kg) [Urine:600 (0.2 mL/kg/hr)]  Weight: 76.1 kg     Past Cardiology Tests (Last 3 Years):  EKG:  ECG 12 Lead 01/11/2024    Echo:  No results found for this or any previous visit from the past 1095 days.    Ejection Fractions:  No results found for: \"EF\"  Cath:  No results found for this or any previous visit from the past 1095 days.    Stress Test:  No results found for this or any previous visit from the past 1095 days.    Cardiac Imaging:  No results found for this or any previous visit from the past 1095 days.      Inpatient Medications:  Scheduled medications   Medication Dose Route Frequency    amoxicillin-pot clavulanate  1 tablet oral q12h ZAC    dexAMETHasone  6 mg oral Daily    fluticasone  1 spray Each Nostril Daily    [Held by provider] fluticasone furoate-vilanteroL  1 puff " inhalation Daily    heparin (porcine)  5,000 Units subcutaneous q8h ZAC    levothyroxine  125 mcg oral Daily before breakfast    loratadine  10 mg oral Daily    melatonin  5 mg oral Nightly    pantoprazole  40 mg oral Daily    perflutren lipid microspheres  0.5-10 mL of dilution intravenous Once in imaging    perflutren protein A microsphere  0.5 mL intravenous Once in imaging    sulfur hexafluoride microsphr  2 mL intravenous Once in imaging    triamcinolone   Topical BID     PRN medications   Medication    acetaminophen    Or    acetaminophen    Or    acetaminophen    albuterol    albuterol    benzocaine-menthol    dextromethorphan-guaifenesin    guaiFENesin    guaiFENesin    ondansetron    polyethylene glycol    traMADol     Continuous Medications   Medication Dose Last Rate       Physical Exam:  General: Patient is in no acute distress.  HEENT: atraumatic normocephalic.  Neck: is supple jugular venous pressure within normal limits no thyromegaly.  Cardiovascular regular rate and rhythm normal heart sounds no murmurs rubs or gallops.  Lungs: clear to auscultation bilaterally.  Abdomen: is soft nontender.  Extremities warm to touch no edema.          Assessment/Plan   1 shortness of breath.  Patient has shortness of breath likely secondary to COVID-19 infection.  Does not appear volume overloaded.  Patient NT-proBNP within normal limit.  Recommendations obtain limited 2D echo to assess ejection fraction.  Management of COVID-19 infection by primary team.     2.  Palpitations.  Patient complains of mild palpitations likely secondary to COVID-19 infection.  I do not see any sustained arrhythmia.  We may consider small dose beta-blocker just to help with the palpitation sensation.   2D echo showed normal ejection fraction.  I do not see indication for beta-blockers.    We will sign off.  Please call with any question.  Outpatient follow-up  Peripheral IV 02/05/24 20 G Right Antecubital (Active)   Site Assessment  Clean;Dry;Intact 02/09/24 0714   Dressing Type Transparent 02/09/24 0714   Line Status Saline locked;Flushed 02/09/24 0714   Dressing Status Clean;Dry 02/09/24 0714   Number of days: 4       Code Status:  Full Code      Kylie Espinoza MD

## 2024-02-11 ENCOUNTER — HOSPITAL ENCOUNTER (EMERGENCY)
Facility: HOSPITAL | Age: 69
Discharge: HOME | End: 2024-02-11
Attending: EMERGENCY MEDICINE
Payer: COMMERCIAL

## 2024-02-11 ENCOUNTER — APPOINTMENT (OUTPATIENT)
Dept: RADIOLOGY | Facility: HOSPITAL | Age: 69
End: 2024-02-11
Payer: COMMERCIAL

## 2024-02-11 ENCOUNTER — APPOINTMENT (OUTPATIENT)
Dept: CARDIOLOGY | Facility: HOSPITAL | Age: 69
End: 2024-02-11
Payer: COMMERCIAL

## 2024-02-11 VITALS
DIASTOLIC BLOOD PRESSURE: 103 MMHG | BODY MASS INDEX: 33.59 KG/M2 | SYSTOLIC BLOOD PRESSURE: 130 MMHG | TEMPERATURE: 97.6 F | RESPIRATION RATE: 18 BRPM | WEIGHT: 182.54 LBS | HEIGHT: 62 IN | OXYGEN SATURATION: 95 % | HEART RATE: 66 BPM

## 2024-02-11 DIAGNOSIS — E87.1 HYPONATREMIA: ICD-10-CM

## 2024-02-11 DIAGNOSIS — R10.30 LOWER ABDOMINAL PAIN: ICD-10-CM

## 2024-02-11 DIAGNOSIS — I15.9 SECONDARY HYPERTENSION: ICD-10-CM

## 2024-02-11 DIAGNOSIS — K56.41 FECAL IMPACTION (MULTI): Primary | ICD-10-CM

## 2024-02-11 PROBLEM — R74.01 TRANSAMINASEMIA: Status: ACTIVE | Noted: 2024-02-11

## 2024-02-11 PROBLEM — K59.00 CONSTIPATION: Status: ACTIVE | Noted: 2024-02-11

## 2024-02-11 LAB
ALBUMIN SERPL-MCNC: 4.1 G/DL (ref 3.5–5)
ALP BLD-CCNC: 88 U/L (ref 35–125)
ALT SERPL-CCNC: 51 U/L (ref 5–40)
ANION GAP SERPL CALC-SCNC: 10 MMOL/L
AST SERPL-CCNC: 33 U/L (ref 5–40)
BILIRUB SERPL-MCNC: 0.6 MG/DL (ref 0.1–1.2)
BUN SERPL-MCNC: 20 MG/DL (ref 8–25)
CALCIUM SERPL-MCNC: 9.2 MG/DL (ref 8.5–10.4)
CHLORIDE SERPL-SCNC: 99 MMOL/L (ref 97–107)
CO2 SERPL-SCNC: 21 MMOL/L (ref 24–31)
CREAT SERPL-MCNC: 0.6 MG/DL (ref 0.4–1.6)
EGFRCR SERPLBLD CKD-EPI 2021: >90 ML/MIN/1.73M*2
ERYTHROCYTE [DISTWIDTH] IN BLOOD BY AUTOMATED COUNT: 12.7 % (ref 11.5–14.5)
FLUAV RNA RESP QL NAA+PROBE: NOT DETECTED
FLUBV RNA RESP QL NAA+PROBE: NOT DETECTED
GLUCOSE SERPL-MCNC: 161 MG/DL (ref 65–99)
HCT VFR BLD AUTO: 39.9 % (ref 36–46)
HGB BLD-MCNC: 13.6 G/DL (ref 12–16)
LIPASE SERPL-CCNC: 54 U/L (ref 16–63)
MCH RBC QN AUTO: 29.5 PG (ref 26–34)
MCHC RBC AUTO-ENTMCNC: 34.1 G/DL (ref 32–36)
MCV RBC AUTO: 87 FL (ref 80–100)
NRBC BLD-RTO: 0 /100 WBCS (ref 0–0)
PLATELET # BLD AUTO: 383 X10*3/UL (ref 150–450)
POTASSIUM SERPL-SCNC: 4.3 MMOL/L (ref 3.4–5.1)
PROT SERPL-MCNC: 6.8 G/DL (ref 5.9–7.9)
RBC # BLD AUTO: 4.61 X10*6/UL (ref 4–5.2)
SARS-COV-2 RNA RESP QL NAA+PROBE: NOT DETECTED
SODIUM SERPL-SCNC: 130 MMOL/L (ref 133–145)
WBC # BLD AUTO: 10.9 X10*3/UL (ref 4.4–11.3)

## 2024-02-11 PROCEDURE — 96374 THER/PROPH/DIAG INJ IV PUSH: CPT

## 2024-02-11 PROCEDURE — 87636 SARSCOV2 & INF A&B AMP PRB: CPT | Performed by: EMERGENCY MEDICINE

## 2024-02-11 PROCEDURE — 96375 TX/PRO/DX INJ NEW DRUG ADDON: CPT

## 2024-02-11 PROCEDURE — 85027 COMPLETE CBC AUTOMATED: CPT | Performed by: EMERGENCY MEDICINE

## 2024-02-11 PROCEDURE — 80053 COMPREHEN METABOLIC PANEL: CPT | Performed by: EMERGENCY MEDICINE

## 2024-02-11 PROCEDURE — 36415 COLL VENOUS BLD VENIPUNCTURE: CPT | Performed by: EMERGENCY MEDICINE

## 2024-02-11 PROCEDURE — 2500000004 HC RX 250 GENERAL PHARMACY W/ HCPCS (ALT 636 FOR OP/ED): Performed by: EMERGENCY MEDICINE

## 2024-02-11 PROCEDURE — 99285 EMERGENCY DEPT VISIT HI MDM: CPT | Mod: 25

## 2024-02-11 PROCEDURE — 83690 ASSAY OF LIPASE: CPT | Performed by: EMERGENCY MEDICINE

## 2024-02-11 PROCEDURE — 74177 CT ABD & PELVIS W/CONTRAST: CPT

## 2024-02-11 PROCEDURE — 93005 ELECTROCARDIOGRAM TRACING: CPT

## 2024-02-11 PROCEDURE — 2550000001 HC RX 255 CONTRASTS: Performed by: EMERGENCY MEDICINE

## 2024-02-11 RX ORDER — MORPHINE SULFATE 2 MG/ML
2 INJECTION, SOLUTION INTRAMUSCULAR; INTRAVENOUS ONCE
Status: COMPLETED | OUTPATIENT
Start: 2024-02-11 | End: 2024-02-11

## 2024-02-11 RX ORDER — ONDANSETRON HYDROCHLORIDE 2 MG/ML
4 INJECTION, SOLUTION INTRAVENOUS ONCE
Status: COMPLETED | OUTPATIENT
Start: 2024-02-11 | End: 2024-02-11

## 2024-02-11 RX ORDER — MORPHINE SULFATE 2 MG/ML
2 INJECTION, SOLUTION INTRAMUSCULAR; INTRAVENOUS ONCE
Status: DISCONTINUED | OUTPATIENT
Start: 2024-02-11 | End: 2024-02-11 | Stop reason: HOSPADM

## 2024-02-11 RX ORDER — AMOXICILLIN 250 MG
1 CAPSULE ORAL 2 TIMES DAILY
Qty: 14 TABLET | Refills: 0 | Status: SHIPPED | OUTPATIENT
Start: 2024-02-11 | End: 2024-02-18

## 2024-02-11 RX ORDER — POLYETHYLENE GLYCOL 3350 17 G/17G
17 POWDER, FOR SOLUTION ORAL 2 TIMES DAILY
Qty: 14 PACKET | Refills: 0 | Status: SHIPPED | OUTPATIENT
Start: 2024-02-11 | End: 2024-02-18

## 2024-02-11 RX ADMIN — MORPHINE SULFATE 2 MG: 2 INJECTION, SOLUTION INTRAMUSCULAR; INTRAVENOUS at 14:59

## 2024-02-11 RX ADMIN — ONDANSETRON 4 MG: 2 INJECTION INTRAMUSCULAR; INTRAVENOUS at 15:00

## 2024-02-11 RX ADMIN — IOHEXOL 75 ML: 350 INJECTION, SOLUTION INTRAVENOUS at 16:19

## 2024-02-11 ASSESSMENT — PAIN - FUNCTIONAL ASSESSMENT
PAIN_FUNCTIONAL_ASSESSMENT: 0-10
PAIN_FUNCTIONAL_ASSESSMENT: 0-10

## 2024-02-11 ASSESSMENT — PAIN SCALES - GENERAL
PAINLEVEL_OUTOF10: 1
PAINLEVEL_OUTOF10: 6
PAINLEVEL_OUTOF10: 6

## 2024-02-11 ASSESSMENT — COLUMBIA-SUICIDE SEVERITY RATING SCALE - C-SSRS
2. HAVE YOU ACTUALLY HAD ANY THOUGHTS OF KILLING YOURSELF?: NO
6. HAVE YOU EVER DONE ANYTHING, STARTED TO DO ANYTHING, OR PREPARED TO DO ANYTHING TO END YOUR LIFE?: NO
1. IN THE PAST MONTH, HAVE YOU WISHED YOU WERE DEAD OR WISHED YOU COULD GO TO SLEEP AND NOT WAKE UP?: NO

## 2024-02-11 ASSESSMENT — PAIN DESCRIPTION - DESCRIPTORS: DESCRIPTORS: ACHING

## 2024-02-11 NOTE — DISCHARGE INSTRUCTIONS
MiraLAX as directed  Increase fluids  Follow-up with primary care physician for reevaluation  Follow-up with gastroenterology for elevated liver enzymes  Return to the emergency department any worsening symptoms or concerns

## 2024-02-11 NOTE — ED PROVIDER NOTES
The patient was seen in conjunction with the advanced practice provider.  I personally saw the patient and made/approved the management plan. I take responsibility for the patient management. If applicable, all laboratory studies, radiology, and EKGs were reviewed by me.     History and Exam by me shows:    68-year-old female presents ER with lower abdominal pain and constipation.  She has been having constipation since she was admitted to the hospital last Monday.  She was admitted for COVID and hyponatremia.  Had diarrhea on Monday and then Tuesday until today has not had a bowel movement.  She is eating.  Mild nausea.  No vomiting.  No fever.  Pain is in the right and left lower quadrants.  No history of small bowel obstructions.  No history of abdominal surgeries.  No improvement with MiraLAX during her admission.  Pain increased today after she drank Epsom salts.    COVID-negative this afternoon.  History from patient and .    Reviewed vital signs.  Constitutional: Looks very uncomfortable awake & alert. No acute distress.  Head: Atraumatic  Eyes: Sclerae are anicteric and not injected.  ENT: Airway is patent.  Neck: Neck is supple and non-tender. No stridor.  Cardiovascular: Regular rate.  Pulmonary/Chest: Clear to auscultation bilaterally . No distress  GI: Soft and non-distended.  Right and left lower with palpation. No rebound, guarding, or rigidity.  Rectal: Patient has large amount of stool in the rectal vault (done with nurse chaperone right and left lower quadrant  Extremities: Full range of motion in all extremities and no deformity.  Neurological: Alert, awake.  Moving all extremities  Skin: Skin is warm and dry.  Psychiatric: Cooperative.    EKG:  interpreted by me, Emergency Department Physician    1.  This bradycardia 56, no ST elevations, QTc is 416, no prior, interpreted at 1506    MDM:    Abdominal Pain: The patient presented with abdominal pain of unclear etiology. A CT scan was  performed to evaluate the abdomen. The history, clinical exam, labs, and imaging have not identified an emergent  or life-threatening cause for the abdominal pain. Given the benign exam, the laboratory studies, and unremarkable CT, I have a very low suspicion for any life-threatening disease. Patient's symptoms improved after ED medication.     Based on the history, physical exam and CT result, I do not believe patient has a SBO, ileus, colitis, diverticulitis, appendicitis, significant systemic disease or sepsis.    Patient has lower abdominal pain after no bowel movement for 5 days.  CT does not show acute abnormality like colitis, ileus, volvulus or diverticulitis.  On exam patient had a fecal impaction.  Enema given after CT results.  Patient had a large bowel movement after enema with resolution of pain.    Extensively discussed the treatment of constipation with patient and .  They will use multiple medications including MiraLAX, senna and docusate.  Patient will continue to follow fluid restrictions for low sodium    They will drink fluids and advance diet as tolerated. Will take oral pain medications. I have discussed with the patient the level of uncertainty with undifferentiated abdominal pain and clearly explained the need to follow-up as noted on the discharge instructions, or return to the Emergency Department immediately if the pain worsens, develops fever, persistent and uncontrollable vomiting, or for any new symptoms or concerns.      Hyponatremia: Stable at 130.  Patient will continue on fluid restriction    I independently interpreted the following studies: Reviewed and interpreted all lab work.  Reviewed CT results.      Clinical Impression:    #1 fecal impaction  #2 Lower abdominal pain  #3 hyponatremia    Attending Emergency Physician       Dandre Liu MD  02/11/24 2145

## 2024-02-11 NOTE — ED PROVIDER NOTES
Department of Emergency Medicine   ED  Provider Note  Admit Date/RoomTime: 2/11/2024  2:36 PM  ED Room: 18/[facility]        History of Present Illness:  Chief Complaint   Patient presents with    Constipation    Abdominal Pain         Ade Fisher is a 68 y.o. female history of hyponatremia, hypertension, hypothyroidism presenting to the ED for abdominal pain no bowel movement for 1 week, patient reports that she was discharged from the hospital on Friday after being seen and evaluated for hyponatremia COVID hypertension and sinusitis.  She did not have a bowel movement while she was in the hospital.  She had been taking MiraLAX with no results.  She did take MiraLAX as well as Epsom salt this morning again and then started having severe abdominal cramping.  She denies any pressure burning frequency with urination.  Planes of nausea.  No no vomiting.  No fever no cough congestion runny nose sore throat.  Review of Systems:   Pertinent positives and negatives are stated within HPI, all other systems reviewed and are negative.        --------------------------------------------- PAST HISTORY ---------------------------------------------  Past Medical History:  has a past medical history of Pain in right hip (12/02/2021), Personal history of other diseases of the respiratory system, Personal history of other endocrine, nutritional and metabolic disease, Personal history of other specified conditions (03/30/2017), and Personal history of urinary (tract) infections (01/18/2022).  Past Surgical History:  has a past surgical history that includes Tonsillectomy (04/12/2013) and Other surgical history (09/23/2013).  Social History:  reports that she has never smoked. She has never used smokeless tobacco. She reports that she does not drink alcohol and does not use drugs.  Family History: family history includes Hypertension in her brother; Lung cancer in her mother.. Unless otherwise noted, family history is non  "contributory  The patient’s home medications have been reviewed.  Allergies: Sulfa (sulfonamide antibiotics)        ---------------------------------------------------PHYSICAL EXAM--------------------------------------    GENERAL APPEARANCE: Awake and alert.  Ill-appearing  VITAL SIGNS: As per the nurses' triage record.   HEENT: Normocephalic, atraumatic. Extraocular muscles are intact. Pupils equal round and reactive to light. Conjunctiva are pink. Negative scleral icterus. Mucous membranes are moist. Tongue in the midline. Pharynx was without erythema or exudates, uvula midline  NECK: Soft Nontender and supple, full gross ROM, no meningeal signs.  CHEST: Nontender to palpation. Clear to auscultation bilaterally. No rales, rhonchi, or wheezing.   HEART: S1, S2. Regular rate and rhythm. No murmurs, gallops or rubs.  Strong and equal pulses in the extremities.   ABDOMEN: Soft, diffuse tenderness, nondistended, positive bowel sounds, no palpable masses.  Rectal exam: Rectal exam was performed by attending physician patient found to have a fecal impaction disimpacted by the attending physician  MUSCULCSKELETAL: The calves are nontender to palpation. Full gross active range of motion. Ambulating on own with no acute difficulties  NEUROLOGICAL: Awake, alert and oriented x 3. Power intact in the upper and lower extremities. Sensation is intact to light touch in the upper and lower extremities.   IMMUNOLOGICAL: No lymphatic streaking noted   DERM: No petechiae, rashes, or ecchymoses.          ------------------------- NURSING NOTES AND VITALS REVIEWED ---------------------------  The nursing notes within the ED encounter and vital signs as below have been reviewed by myself  BP (!) 130/103   Pulse 66   Temp 36.4 °C (97.6 °F) (Oral)   Resp 18   Ht 1.575 m (5' 2\")   Wt 82.8 kg (182 lb 8.7 oz)   SpO2 95%   BMI 33.39 kg/m²     Oxygen Saturation Interpretation: Pulse ox 95%      The patient’s available past medical " records and past encounters were reviewed.          -----------------------DIAGNOSTIC RESULTS------------------------  LABS:    Labs Reviewed   COMPREHENSIVE METABOLIC PANEL - Abnormal       Result Value    Glucose 161 (*)     Sodium 130 (*)     Potassium 4.3      Chloride 99      Bicarbonate 21 (*)     Urea Nitrogen 20      Creatinine 0.60      eGFR >90      Calcium 9.2      Albumin 4.1      Alkaline Phosphatase 88      Total Protein 6.8      AST 33      Bilirubin, Total 0.6      ALT 51 (*)     Anion Gap 10     SARS-COV-2 AND INFLUENZA A/B PCR - Normal    Flu A Result Not Detected      Flu B Result Not Detected      Coronavirus 2019, PCR Not Detected      Narrative:     This assay has received FDA Emergency Use Authorization (EUA) and  is only authorized for the duration of time that circumstances exist to justify the authorization of the emergency use of in vitro diagnostic tests for the detection of SARS-CoV-2 virus and/or diagnosis of COVID-19 infection under section 564(b)(1) of the Act, 21 U.S.C. 360bbb-3(b)(1). Testing for SARS-CoV-2 is only recommended for patients who meet current clinical and/or epidemiological criteria as defined by federal, state, or local public health directives. This assay is an in vitro diagnostic nucleic acid amplification test for the qualitative detection of SARS-CoV-2, Influenza A, and Influenza B from nasopharyngeal specimens and has been validated for use at [facility]. Negative results do not preclude COVID-19 infections or Influenza A/B infections, and should not be used as the sole basis for diagnosis, treatment, or other management decisions. If Influenza A/B and RSV PCR results are negative, testing for Parainfluenza virus, Adenovirus and Metapneumovirus is routinely performed for [facility] pediatric oncology and intensive care inpatients, and is available on other patients by placing an add-on request.    CBC - Normal    WBC 10.9      nRBC 0.0      RBC  4.61      Hemoglobin 13.6      Hematocrit 39.9      MCV 87      MCH 29.5      MCHC 34.1      RDW 12.7      Platelets 383     LIPASE - Normal    Lipase 54         As interpreted by me, the above displayed labs are abnormal. All other labs obtained during this visit were within normal range or not returned as of this dictation.    1. This bradycardia 56, no ST elevations, QTc is 416, no prior, interpreted at 1506         CT abdomen pelvis w IV contrast   Final Result   Colonic diverticulosis without acute diverticulitis. Constipation   without bowel obstruction. Redemonstration of cholelithiasis and   uterine leiomyoma. No acute process in the abdomen and pelvis.        Signed by: Blair Mclain 2/11/2024 4:39 PM   Dictation workstation:   [id number]              CT abdomen pelvis w IV contrast   Final Result   Colonic diverticulosis without acute diverticulitis. Constipation   without bowel obstruction. Redemonstration of cholelithiasis and   uterine leiomyoma. No acute process in the abdomen and pelvis.        Signed by: Blair Mclain 2/11/2024 4:39 PM   Dictation workstation:   [id number]              ------------------------------ ED COURSE/MEDICAL DECISION MAKING----------------------  Medical Decision Making:   Exam: A medically appropriate exam performed, outlined above, given the known history and presentation.    History obtained from: Review of medical record nursing notes patient family      Social Determinants of Health considered during this visit: Lives at home with family      PAST MEDICAL HISTORY/Chronic Conditions Affecting Care     has a past medical history of Pain in right hip (12/02/2021), Personal history of other diseases of the respiratory system, Personal history of other endocrine, nutritional and metabolic disease, Personal history of other specified conditions (03/30/2017), and Personal history of urinary (tract) infections (01/18/2022).       CC/HPI Summary, Social Determinants of  health, Records Reviewed, DDx, testing done/not done, ED Course, Reassessment, disposition considerations/shared decision making with patient, consults, disposition:   Presents with abdominal pain constipation no bowel movement for 1 week  Plan:  Morphine  Zofran  CT abdomen / pelvis : Colonic diverticulosis without acute diverticulitis. Constipation  without bowel obstruction. Redemonstration of cholelithiasis and  uterine leiomyoma. No acute process in the abdomen and pelvis.  EKG  CBC  CMP  Lipase  COVID flu  Medical Decision Making/Differential Diagnosis:  Differentials include but not limited to constipation versus fecal impaction versus obstruction versus gastroenteritis versus biliary colic.  Patient denying any urinary symptoms.  Review:  Glucose 161  Sodium 130  Bicarb 21 otherwise electrolytes within normal limits  Creatinine 0.6  BUN 20  LFTs within normal limits except for ALT is elevated at 51.  Antihypertensive  White blood cell count 10.9  Hemoglobin 13.6  Lipase 54  Upon review glucose is elevated no signs of DKA.  Sodium slightly low otherwise electrolytes unremarkable with electrolyte imbalance noted.  AST is elevated at 51 no signs of jaundice CT of the abdomen pelvis obtained showed Colonic diverticulosis without acute diverticulitis. Constipation  without bowel obstruction. Redemonstration of cholelithiasis and  uterine leiomyoma. No acute process in the abdomen and pelvis.  Patient was noted to have a fecal impaction by attending physician exam.  Was given enema with positive results.  Abdominal pain is improved.  Based on her clinical presentation history and symptoms consistent with constipation  Fecal impaction.  After enema fecal impaction was evacuated by the patient.  She had a large bowel movement and reports improvement of her pain.  Feels comfortable going home at this time.  Incidental findings reviewed with patient.  Will have her follow-up with primary care physician for  reevaluation will give her referral to gastroenterology.  She is to return to the emergency department any worsening referral to gastroenterology MiraLAX and senna use as directed for constipation.  Symptoms or concerns patient seen and evaluated with attending physician Dr. Santizo  Patient has history of hyponatremia.  I discussed her water intake and hyponatremia to help balance her constipation issues.  This was done by attending physician.  Blood pressure was noted to be elevated.  Advised her to follow-up with her primary care physician.  Reports that they did take her off of her blood pressure medications while she was in the hospital secondary to hyponatremia.  I did advise her to document her blood pressures daily and to call her doctor tomorrow to have evaluation of her blood pressure and need for additional blood pressure medication patient verbalizes understanding as well as family patient discharged home      PROCEDURES  Unless otherwise noted below, none      CONSULTS:   None      ED Course as of 02/11/24 2148   Sun Feb 11, 2024   1650  Reviewed CT results per radiology  IMPRESSION:  Colonic diverticulosis without acute diverticulitis. Constipation  without bowel obstruction. Redemonstration of cholelithiasis and  uterine leiomyoma. No acute process in the abdomen and pelvis.       [RF]      ED Course User Index  [RF] Dandre Liu MD         Diagnoses as of 02/11/24 2148   Lower abdominal pain   Fecal impaction (CMS/HCC)   Hyponatremia   Secondary hypertension         This patient has remained hemodynamically stable during their ED course.      Critical Care: none        Counseling:  The emergency provider has spoken with the family and discussed today’s results, in addition to providing specific details for the plan of care and counseling regarding the diagnosis and prognosis.  Questions are answered at this time and they are agreeable with the plan.          --------------------------------- IMPRESSION AND DISPOSITION ---------------------------------    IMPRESSION  1. Fecal impaction (CMS/HCC)    2. Lower abdominal pain    3. Hyponatremia    4. Secondary hypertension        DISPOSITION  Disposition: Discharge home  Patient condition is stable improved        NOTE: This report was transcribed using voice recognition software. Every effort was made to ensure accuracy; however, inadvertent computerized transcription errors may be present      Mariajose Lind, KLAUDIA-CNP  02/11/24 7577

## 2024-02-13 ENCOUNTER — LAB (OUTPATIENT)
Dept: LAB | Facility: LAB | Age: 69
End: 2024-02-13
Payer: COMMERCIAL

## 2024-02-13 ENCOUNTER — TELEPHONE (OUTPATIENT)
Dept: INPATIENT UNIT | Facility: HOSPITAL | Age: 69
End: 2024-02-13

## 2024-02-13 ENCOUNTER — OFFICE VISIT (OUTPATIENT)
Dept: PRIMARY CARE | Facility: CLINIC | Age: 69
End: 2024-02-13
Payer: COMMERCIAL

## 2024-02-13 VITALS
SYSTOLIC BLOOD PRESSURE: 126 MMHG | WEIGHT: 167.4 LBS | BODY MASS INDEX: 30.8 KG/M2 | DIASTOLIC BLOOD PRESSURE: 64 MMHG | HEIGHT: 62 IN

## 2024-02-13 DIAGNOSIS — I10 PRIMARY HYPERTENSION: Primary | ICD-10-CM

## 2024-02-13 DIAGNOSIS — E87.1 HYPONATREMIA: ICD-10-CM

## 2024-02-13 LAB
ANION GAP SERPL CALC-SCNC: 13 MMOL/L (ref 10–20)
BUN SERPL-MCNC: 18 MG/DL (ref 6–23)
CALCIUM SERPL-MCNC: 9.7 MG/DL (ref 8.6–10.6)
CHLORIDE SERPL-SCNC: 96 MMOL/L (ref 98–107)
CO2 SERPL-SCNC: 29 MMOL/L (ref 21–32)
CREAT SERPL-MCNC: 0.81 MG/DL (ref 0.5–1.05)
EGFRCR SERPLBLD CKD-EPI 2021: 79 ML/MIN/1.73M*2
GLUCOSE SERPL-MCNC: 87 MG/DL (ref 74–99)
POTASSIUM SERPL-SCNC: 5.6 MMOL/L (ref 3.5–5.3)
SODIUM SERPL-SCNC: 132 MMOL/L (ref 136–145)

## 2024-02-13 PROCEDURE — 1111F DSCHRG MED/CURRENT MED MERGE: CPT | Performed by: INTERNAL MEDICINE

## 2024-02-13 PROCEDURE — 80048 BASIC METABOLIC PNL TOTAL CA: CPT

## 2024-02-13 PROCEDURE — 1126F AMNT PAIN NOTED NONE PRSNT: CPT | Performed by: INTERNAL MEDICINE

## 2024-02-13 PROCEDURE — 3078F DIAST BP <80 MM HG: CPT | Performed by: INTERNAL MEDICINE

## 2024-02-13 PROCEDURE — 1159F MED LIST DOCD IN RCRD: CPT | Performed by: INTERNAL MEDICINE

## 2024-02-13 PROCEDURE — 3074F SYST BP LT 130 MM HG: CPT | Performed by: INTERNAL MEDICINE

## 2024-02-13 PROCEDURE — 36415 COLL VENOUS BLD VENIPUNCTURE: CPT

## 2024-02-13 PROCEDURE — 1036F TOBACCO NON-USER: CPT | Performed by: INTERNAL MEDICINE

## 2024-02-13 PROCEDURE — 99213 OFFICE O/P EST LOW 20 MIN: CPT | Performed by: INTERNAL MEDICINE

## 2024-02-13 ASSESSMENT — ENCOUNTER SYMPTOMS
OCCASIONAL FEELINGS OF UNSTEADINESS: 0
DEPRESSION: 0
LOSS OF SENSATION IN FEET: 0

## 2024-02-15 LAB
ATRIAL RATE: 57 BPM
P AXIS: 64 DEGREES
P OFFSET: 187 MS
P ONSET: 140 MS
PR INTERVAL: 162 MS
Q ONSET: 221 MS
QRS COUNT: 9 BEATS
QRS DURATION: 94 MS
QT INTERVAL: 428 MS
QTC CALCULATION(BAZETT): 416 MS
QTC FREDERICIA: 420 MS
R AXIS: 42 DEGREES
T AXIS: 70 DEGREES
T OFFSET: 435 MS
VENTRICULAR RATE: 57 BPM

## 2024-02-20 ENCOUNTER — LAB (OUTPATIENT)
Dept: LAB | Facility: LAB | Age: 69
End: 2024-02-20
Payer: COMMERCIAL

## 2024-02-20 DIAGNOSIS — E87.1 HYPONATREMIA: ICD-10-CM

## 2024-02-20 LAB
ANION GAP SERPL CALC-SCNC: 14 MMOL/L (ref 10–20)
BUN SERPL-MCNC: 9 MG/DL (ref 6–23)
CALCIUM SERPL-MCNC: 9.6 MG/DL (ref 8.6–10.6)
CHLORIDE SERPL-SCNC: 103 MMOL/L (ref 98–107)
CO2 SERPL-SCNC: 26 MMOL/L (ref 21–32)
CREAT SERPL-MCNC: 0.66 MG/DL (ref 0.5–1.05)
EGFRCR SERPLBLD CKD-EPI 2021: >90 ML/MIN/1.73M*2
GLUCOSE SERPL-MCNC: 97 MG/DL (ref 74–99)
POTASSIUM SERPL-SCNC: 4.5 MMOL/L (ref 3.5–5.3)
SODIUM SERPL-SCNC: 138 MMOL/L (ref 136–145)

## 2024-02-20 PROCEDURE — 80048 BASIC METABOLIC PNL TOTAL CA: CPT

## 2024-02-20 PROCEDURE — 36415 COLL VENOUS BLD VENIPUNCTURE: CPT

## 2024-02-21 NOTE — PROGRESS NOTES
"Subjective   Patient ID: Ade Fisher is a 68 y.o. female who presents for hospital discharge.    HPI   Patient is here for ER follow-up.  She has COVID and she was drinking a lot of fluid became very weak.  In the hospital she was found to have low sodium.  She was treated for COVID hyponatremia hypertension sinusitis.  She ended up in the ER again because she got constipated with abdominal cramping.  In the hospital given GI cocktail and her symptoms resolved.  She is feeling much better now      Past recap  patient is here for follow-up hypertension high cholesterol hypothyroidism  Not taking statin  Follow-up on hypothyroidism  Did blood work  Over the weekend having fleeting chest pains a lot of stress not sleeping heart was beating weird        Follow-up on hypertension high cholesterol hypothyroidism  Did blood work  She has to go off the statins because it gave her severe muscle pain,  Wants refill on allergy medications  Needs refill on eczema cream  Her asthma is flaring up as she moved to new house and lot of work of remodeling going on refill of nebulizer solution  She feels her ears stuffy      Patient presents with complaints of urinary symptoms having some frequency dark urine and back pain over the weekend  She had colonoscopy done couple of months ago by Dr. Chris diagnosed with diverticular disease     She did not do sleep study     Follow-up on hypertension high cholesterol hypothyroidism  Needs medication refill  Did blood work  She feels very tired because she does not get enough sleep  She does snore but does not think she has sleep apnea did not do sleep study  She is off the statins could not tolerate the side effects  She was on vacation and was not watching her diet     Nonsmoker nondrinker  Family history mother  of lung cancer father  of colon cancer at 85 grandmother had stroke  Review of Systems    Objective   /64   Ht 1.575 m (5' 2\")   Wt 75.9 kg (167 lb 6.4 oz)   " BMI 30.62 kg/m²     Physical Exam  Vitals reviewed.   Constitutional:       Appearance: Normal appearance.   HENT:      Head: Normocephalic and atraumatic.      Right Ear: Tympanic membrane, ear canal and external ear normal.      Left Ear: Tympanic membrane, ear canal and external ear normal.      Nose: Nose normal.      Mouth/Throat:      Pharynx: Oropharynx is clear.   Eyes:      Extraocular Movements: Extraocular movements intact.      Conjunctiva/sclera: Conjunctivae normal.      Pupils: Pupils are equal, round, and reactive to light.   Cardiovascular:      Rate and Rhythm: Normal rate and regular rhythm.      Pulses: Normal pulses.      Heart sounds: Normal heart sounds.   Pulmonary:      Effort: Pulmonary effort is normal.      Breath sounds: Normal breath sounds.   Abdominal:      General: Abdomen is flat. Bowel sounds are normal.      Palpations: Abdomen is soft.   Musculoskeletal:      Cervical back: Normal range of motion and neck supple.   Skin:     General: Skin is warm and dry.   Neurological:      General: No focal deficit present.      Mental Status: She is alert and oriented to person, place, and time.   Psychiatric:         Mood and Affect: Mood normal.         Assessment/Plan   Problem List Items Addressed This Visit             ICD-10-CM       Cardiac and Vasculature    Hypertension - Primary I10       Endocrine/Metabolic    Hyponatremia E87.1    Relevant Orders    Basic Metabolic Panel (Completed)    Basic Metabolic Panel (Completed)    Basic Metabolic Panel    Basic Metabolic Panel     Past recap  Blood pressure is stable  Will decrease levothyroxine 100 mcg  Ultrasound thyroid for the thyroid nodule yearly follow-up  Cholesterol is extremely high but patient does not want to take medications  She understands risks  She is cannot do it with diet and exercise  For hematuria she saw the urologist cystoscopy normal  Patient thinks that years she was going through a lot of stress and anxiety could  have been contributing to a lot of her symptoms  Follow-up blood work in 3 months     3/6/23  Patient tympanic membrane clear  She has some tenderness in the TMJ  Flonase nasal spray to help with the eustachian tube dysfunction  Blood work reviewed  TSH little elevated I think patient was missing few dosages which she accepted  We will continue levothyroxine 100 mcg  Patient is again refusing to take statins for high cholesterol  We will do CT cardiac scoring to assess the risk  Follow-up blood work in 6 months     6/7/2023  Treat with Zithromax for 10 days  Flonase nasal spray  Albuterol inhaler  Plenty of fluids  Follow-up if not better  I am wondering if patient has underlying allergies causing the symptoms  Advised to follow-up if not better     6/23/2023  I am currently not convinced if patient has sinus infection  I think patient is having rhinitis and allergies  Advised to use Flonase twice a day  Use Claritin-D  If not better then only try antibiotic  Also offered patient to do CAT scan of the sinuses but she wants to wait     8/15/2023     Patient has mild eustachian tube dysfunction on the right side  Sinuses passages clean  I am wondering if patient has acid reflux causing chest congestion  Treat with Protonix Z-Jez and Medrol Dosepak  We will do CT of the sinuses for recurrent sinusitis  Keep her follow-up with the ENT     11/13/2023  Blood work reviewed  TSH elevated at 8.64  Increase levothyroxine 125 mcg  Cholesterol is high   Patient is intolerant to statins and does not want to take it  She will do with diet and exercise  Cholesterol diet chart given  LFT still elevated  Again emphasized low-carb diet  Follow-up blood work in 3 months     1/11/2024  EKG done shows normal sinus rhythm  Troponin ordered  Blood work reviewed all in normal range  Continue current medications  Advised to go to the emergency room if chest pain happens again  Stress test done in 2019 was normal  Patient has not done  CT cardiac scoring advised to do  Discussed diet exercise and lifestyle modification to    2/13/2024  Hospital records reviewed ER records reviewed  Advised patient to cut down nonessential liquids and diet.  Cut down soups  Cut down soft drinks  BMP q. weekly for 4 times  Routine follow-up

## 2024-02-26 ENCOUNTER — HOSPITAL ENCOUNTER (OUTPATIENT)
Dept: RADIOLOGY | Facility: CLINIC | Age: 69
Discharge: HOME | End: 2024-02-26
Payer: COMMERCIAL

## 2024-02-26 ENCOUNTER — OFFICE VISIT (OUTPATIENT)
Dept: PRIMARY CARE | Facility: CLINIC | Age: 69
End: 2024-02-26
Payer: COMMERCIAL

## 2024-02-26 VITALS
DIASTOLIC BLOOD PRESSURE: 72 MMHG | HEIGHT: 62 IN | WEIGHT: 169 LBS | SYSTOLIC BLOOD PRESSURE: 142 MMHG | BODY MASS INDEX: 31.1 KG/M2

## 2024-02-26 DIAGNOSIS — R05.3 CHRONIC COUGH: ICD-10-CM

## 2024-02-26 DIAGNOSIS — R09.81 SINUS CONGESTION: ICD-10-CM

## 2024-02-26 DIAGNOSIS — J01.11 ACUTE RECURRENT FRONTAL SINUSITIS: ICD-10-CM

## 2024-02-26 PROCEDURE — 3077F SYST BP >= 140 MM HG: CPT | Performed by: INTERNAL MEDICINE

## 2024-02-26 PROCEDURE — 1036F TOBACCO NON-USER: CPT | Performed by: INTERNAL MEDICINE

## 2024-02-26 PROCEDURE — 1111F DSCHRG MED/CURRENT MED MERGE: CPT | Performed by: INTERNAL MEDICINE

## 2024-02-26 PROCEDURE — 1126F AMNT PAIN NOTED NONE PRSNT: CPT | Performed by: INTERNAL MEDICINE

## 2024-02-26 PROCEDURE — 3078F DIAST BP <80 MM HG: CPT | Performed by: INTERNAL MEDICINE

## 2024-02-26 PROCEDURE — 70220 X-RAY EXAM OF SINUSES: CPT | Performed by: RADIOLOGY

## 2024-02-26 PROCEDURE — 99213 OFFICE O/P EST LOW 20 MIN: CPT | Performed by: INTERNAL MEDICINE

## 2024-02-26 PROCEDURE — 1160F RVW MEDS BY RX/DR IN RCRD: CPT | Performed by: INTERNAL MEDICINE

## 2024-02-26 PROCEDURE — 1159F MED LIST DOCD IN RCRD: CPT | Performed by: INTERNAL MEDICINE

## 2024-02-26 PROCEDURE — 70220 X-RAY EXAM OF SINUSES: CPT

## 2024-02-26 RX ORDER — AMOXICILLIN AND CLAVULANATE POTASSIUM 875; 125 MG/1; MG/1
875 TABLET, FILM COATED ORAL 2 TIMES DAILY
Qty: 20 TABLET | Refills: 0 | Status: SHIPPED | OUTPATIENT
Start: 2024-02-26 | End: 2024-03-01 | Stop reason: ALTCHOICE

## 2024-02-27 ENCOUNTER — LAB (OUTPATIENT)
Dept: LAB | Facility: LAB | Age: 69
End: 2024-02-27
Payer: COMMERCIAL

## 2024-02-27 DIAGNOSIS — E87.1 HYPONATREMIA: ICD-10-CM

## 2024-02-27 LAB
ANION GAP SERPL CALC-SCNC: 13 MMOL/L (ref 10–20)
BUN SERPL-MCNC: 10 MG/DL (ref 6–23)
CALCIUM SERPL-MCNC: 10 MG/DL (ref 8.6–10.6)
CHLORIDE SERPL-SCNC: 104 MMOL/L (ref 98–107)
CO2 SERPL-SCNC: 28 MMOL/L (ref 21–32)
CREAT SERPL-MCNC: 0.64 MG/DL (ref 0.5–1.05)
EGFRCR SERPLBLD CKD-EPI 2021: >90 ML/MIN/1.73M*2
GLUCOSE SERPL-MCNC: 106 MG/DL (ref 74–99)
POTASSIUM SERPL-SCNC: 4.4 MMOL/L (ref 3.5–5.3)
SODIUM SERPL-SCNC: 141 MMOL/L (ref 136–145)

## 2024-02-27 PROCEDURE — 36415 COLL VENOUS BLD VENIPUNCTURE: CPT

## 2024-02-27 PROCEDURE — 80048 BASIC METABOLIC PNL TOTAL CA: CPT

## 2024-02-28 PROBLEM — R74.01 ELEVATION OF LEVELS OF LIVER TRANSAMINASE LEVELS: Status: ACTIVE | Noted: 2024-02-11

## 2024-03-01 ENCOUNTER — OFFICE VISIT (OUTPATIENT)
Dept: CARDIOLOGY | Facility: CLINIC | Age: 69
End: 2024-03-01
Payer: COMMERCIAL

## 2024-03-01 VITALS
BODY MASS INDEX: 31.34 KG/M2 | HEART RATE: 73 BPM | TEMPERATURE: 98.6 F | DIASTOLIC BLOOD PRESSURE: 88 MMHG | RESPIRATION RATE: 18 BRPM | WEIGHT: 166 LBS | SYSTOLIC BLOOD PRESSURE: 159 MMHG | HEIGHT: 61 IN

## 2024-03-01 DIAGNOSIS — R06.02 SOB (SHORTNESS OF BREATH) ON EXERTION: ICD-10-CM

## 2024-03-01 DIAGNOSIS — I10 PRIMARY HYPERTENSION: Primary | ICD-10-CM

## 2024-03-01 PROCEDURE — 1036F TOBACCO NON-USER: CPT | Performed by: INTERNAL MEDICINE

## 2024-03-01 PROCEDURE — 1126F AMNT PAIN NOTED NONE PRSNT: CPT | Performed by: INTERNAL MEDICINE

## 2024-03-01 PROCEDURE — 3077F SYST BP >= 140 MM HG: CPT | Performed by: INTERNAL MEDICINE

## 2024-03-01 PROCEDURE — 99213 OFFICE O/P EST LOW 20 MIN: CPT | Performed by: INTERNAL MEDICINE

## 2024-03-01 PROCEDURE — 3079F DIAST BP 80-89 MM HG: CPT | Performed by: INTERNAL MEDICINE

## 2024-03-01 PROCEDURE — 1159F MED LIST DOCD IN RCRD: CPT | Performed by: INTERNAL MEDICINE

## 2024-03-01 PROCEDURE — 1111F DSCHRG MED/CURRENT MED MERGE: CPT | Performed by: INTERNAL MEDICINE

## 2024-03-01 RX ORDER — OLMESARTAN MEDOXOMIL 40 MG/1
40 TABLET ORAL DAILY
Qty: 30 TABLET | Refills: 11 | Status: SHIPPED | OUTPATIENT
Start: 2024-03-01 | End: 2025-03-01

## 2024-03-01 ASSESSMENT — PAIN SCALES - GENERAL: PAINLEVEL: 0-NO PAIN

## 2024-03-01 ASSESSMENT — PATIENT HEALTH QUESTIONNAIRE - PHQ9
SUM OF ALL RESPONSES TO PHQ9 QUESTIONS 1 AND 2: 0
1. LITTLE INTEREST OR PLEASURE IN DOING THINGS: NOT AT ALL
2. FEELING DOWN, DEPRESSED OR HOPELESS: NOT AT ALL

## 2024-03-01 NOTE — PROGRESS NOTES
Subjective   Chief Complaint   Patient presents with    Hospital Follow-up     Mrs. Fisher presents to the office today for a hospital follow up         68-year-old female with a history of hyperlipidemia, borderline hypertension.  Patient had recently COVID-19 was admitted to hospital at the end of the January.  Patient was seen by cardiologist for underlying tachycardia palpitation which could be from COVID  Patient had echocardiogram done showed normal ejection fraction with a trivial mitral regurgitation see otherwise unremarkable.  Patient here for further evaluation.  No active angina or CHF signs symptoms currently on bronchodilators.  Patient only taking a PPI and Synthroid.  Somewhat active.  Patient was taking blood pressure medication has discontinue when blood pressure is running low in the hospital.  Patient was taking olmesartan 40 mg tablet p.o. once a day.    Past Medical History:   Diagnosis Date    Chest pain 03/01/2023    Dizziness 03/01/2023    Elevated CPK 03/01/2023    Hyperlipidemia 03/01/2023    Hypertension 03/01/2023    Pain in right hip 12/02/2021    Hip pain, bilateral    Personal history of other diseases of the respiratory system     Personal history of asthma    Personal history of other endocrine, nutritional and metabolic disease     History of hyperlipidemia    Personal history of other specified conditions 03/30/2017    History of snoring    Personal history of urinary (tract) infections 01/18/2022    History of urinary tract infection    Pure hypercholesterolemia 03/01/2023    SOB (shortness of breath) on exertion 03/01/2023    Thyroid nodule 03/01/2023     Past Surgical History:   Procedure Laterality Date    CARPAL TUNNEL RELEASE Bilateral     TONSILLECTOMY       No relevant family history has been documented for this patient.  Current Outpatient Medications   Medication Sig Dispense Refill    albuterol 2.5 mg /3 mL (0.083 %) nebulizer solution Take 3 mL (2.5 mg) by nebulization  "4 times a day. USE 1 UNIT DOSE EVERY 4-6 HOURS AS NEEDED FOR WHEEZING . 2400 mL 0    albuterol 90 mcg/actuation inhaler Inhale 2 puffs every 4 hours if needed for wheezing or shortness of breath. 18 g 0    budesonide-formoteroL (Symbicort) 160-4.5 mcg/actuation inhaler Inhale 2 puffs 2 times a day. RINSE MOUTH AFTER USE. 10.2 g 0    fluticasone (Cutivate) 0.05 % cream APPLY AND GENTLY MASSAGE INTO AFFECTED AREA(S) TWICE DAILY (Patient taking differently: Apply 1 Application topically 2 times a day as needed. Apply and gentaly massage into affected area(s) twice daily) 60 g 1    fluticasone (Flonase) 50 mcg/actuation nasal spray Administer 1 spray into each nostril once daily. 16 g 1    levocetirizine (Xyzal) 5 mg tablet Take 1 tablet (5 mg) by mouth once daily. 90 tablet 0    levothyroxine (Synthroid, Levoxyl) 125 mcg tablet Take 1 tablet (125 mcg) by mouth once daily. 90 tablet 0    pantoprazole (ProtoNix) 40 mg EC tablet Take 1 tablet (40 mg) by mouth once daily. Do not crush, chew, or split. (Patient taking differently: Take 1 tablet (40 mg) by mouth if needed. Do not crush, chew, or split.) 90 tablet 0    olmesartan (Benicar) 40 mg tablet Take 1 tablet (40 mg) by mouth once daily. 30 tablet 11     No current facility-administered medications for this visit.      reports that she has never smoked. She has never been exposed to tobacco smoke. She has never used smokeless tobacco. She reports that she does not drink alcohol and does not use drugs.  Sulfa (sulfonamide antibiotics)  Primary hypertension    Vitals:    03/01/24 1019   BP: 159/88   Pulse: 73   Resp: 18   Temp: 37 °C (98.6 °F)   Weight: 75.3 kg (166 lb)   Height: 1.549 m (5' 1\")   PainSc: 0-No pain      BMI:Body mass index is 31.37 kg/m².   General Cardiology:  General Appearance: Alert, oriented and in no acute distress.  HEENT: extra ocular movements intact (EOMI), pupils equal,  round, reactive to light and accommodation (PERRLA).  Carotid Upstroke: " no bruit, normal.  Jugular Venous Distention (JVD): flat.  Chest: normal.  Lungs: Clear to auscultation,   Heart Sounds: no S3 or S4, normal S1, S2, regular rate.  Murmur, Click, Gallop: no systolic murmur.  Abdomen: no hepatomegaly, no masses felt, soft.  Extremities: no leg edema.  Peripheral pulses: 2 plus bilateral.  NEUROLOGY Cranial nerves II-XII grossly intact.     Patient Active Problem List   Diagnosis    Allergic rhinitis    Asthma    Atrophy of vagina    Back pain with radiation    Back pain    Cholelithiasis    Cough    COVID-19 virus infection    Dark brown-colored urine    Dermatitis, eczematoid    Disorder of bone and articular cartilage    Dizziness    Dry skin    Elevated CPK    Fatigue    Flu-like symptoms    Fungal rash of trunk    Hematuria    Abdominal pain    Chest pain    Hip pain, bilateral    Hyperglycemia    Hyperlipidemia    Hypertension    Hyponatremia    Hypothyroidism    Influenza    Low back pain    Myalgia    Neck pain    Oral thrush    Pain of left upper extremity    Pelvic pain in female    Pure hypercholesterolemia    Snoring    SOB (shortness of breath) on exertion    Thyroid lump    Urine frequency    Uterine fibroid    UTI (urinary tract infection)    Candidiasis of vagina    Vitamin D deficiency    Sinusitis    Sinus congestion    Class 1 obesity with body mass index (BMI) of 30.0 to 30.9 in adult    Thyroid nodule    Pain in female pelvis    Acute bronchitis    Acute sinusitis    Stricture and stenosis of cervix uteri    Dyspareunia in female    COVID-19    Transaminasemia    Constipation    Fecal impaction (CMS/HCC)    Elevation of levels of liver transaminase levels       Problem List Items Addressed This Visit          Cardiac and Vasculature    Hypertension - Primary    Relevant Medications    olmesartan (Benicar) 40 mg tablet    SOB (shortness of breath) on exertion    Relevant Medications    olmesartan (Benicar) 40 mg tablet      68-year-old female patient with history  of recently admitted for COVID-19 in the hospital.  Patient found having low blood pressure as well as hyponatremia.  Currently off the blood pressure medication.  For routine evaluation patient history of asthma currently on bronchodilators.  Patient is new to me for follow-up in office after hospitalization.  1.  Hypertension:.  Patient does blood pressure at home range around 140 systolic with diastolic runs around 85-90.  Will restart patient back on olmesartan 40 mg once a day check blood pressure at home.  Advised patient to avoid lunch meats, canned soups, pizzas, bread rolls, and sandwiches. Advised patient to limit salt intake 1,500 mg daily. Advised patient to exercise 30 mins/3 times a week including treadmill or aerobic type, Goal to achieve 65% target HR.  Diet and exercise reviewed with patient..advice to walk about 10,000 steps or about 2 hours during day time. Cut back on salt, sugar and flour.  Advised patient to check blood pressure twice a day for next 10 days and give us a call if her blood pressure remains above 170 systolic and diastolic above 95.  Meanwhile cut back on salt, caffeine.  If blood pressure persistently stays above 200 systolic then go to nearest emergency room or call 911.  Pt. care time is spent includes review of diagnostic tests, labs, radiographs, EKGs, old echoes, cardiac work-up and coordination of care. Assessment, impression and plans are reflected in the note above as well as the orders.      Jaiden Baker MD

## 2024-03-05 ENCOUNTER — LAB (OUTPATIENT)
Dept: LAB | Facility: LAB | Age: 69
End: 2024-03-05
Payer: COMMERCIAL

## 2024-03-05 ENCOUNTER — OFFICE VISIT (OUTPATIENT)
Dept: OTOLARYNGOLOGY | Facility: CLINIC | Age: 69
End: 2024-03-05
Payer: COMMERCIAL

## 2024-03-05 VITALS — BODY MASS INDEX: 31.91 KG/M2 | WEIGHT: 169 LBS | TEMPERATURE: 96.8 F | HEIGHT: 61 IN

## 2024-03-05 DIAGNOSIS — J32.9 RECURRENT SINUSITIS: Primary | ICD-10-CM

## 2024-03-05 DIAGNOSIS — E87.1 HYPONATREMIA: ICD-10-CM

## 2024-03-05 DIAGNOSIS — J30.2 SEASONAL AND PERENNIAL ALLERGIC RHINITIS: ICD-10-CM

## 2024-03-05 DIAGNOSIS — R40.0 DAYTIME SOMNOLENCE: ICD-10-CM

## 2024-03-05 DIAGNOSIS — J30.89 SEASONAL AND PERENNIAL ALLERGIC RHINITIS: ICD-10-CM

## 2024-03-05 DIAGNOSIS — R06.83 SNORING: ICD-10-CM

## 2024-03-05 DIAGNOSIS — G47.33 OBSTRUCTIVE SLEEP APNEA: ICD-10-CM

## 2024-03-05 DIAGNOSIS — G47.33 OSA (OBSTRUCTIVE SLEEP APNEA): ICD-10-CM

## 2024-03-05 LAB
ANION GAP SERPL CALC-SCNC: 13 MMOL/L (ref 10–20)
BUN SERPL-MCNC: 15 MG/DL (ref 6–23)
CALCIUM SERPL-MCNC: 9.9 MG/DL (ref 8.6–10.6)
CHLORIDE SERPL-SCNC: 103 MMOL/L (ref 98–107)
CO2 SERPL-SCNC: 28 MMOL/L (ref 21–32)
CREAT SERPL-MCNC: 0.64 MG/DL (ref 0.5–1.05)
EGFRCR SERPLBLD CKD-EPI 2021: >90 ML/MIN/1.73M*2
GLUCOSE SERPL-MCNC: 93 MG/DL (ref 74–99)
POTASSIUM SERPL-SCNC: 4.8 MMOL/L (ref 3.5–5.3)
SODIUM SERPL-SCNC: 139 MMOL/L (ref 136–145)

## 2024-03-05 PROCEDURE — 1126F AMNT PAIN NOTED NONE PRSNT: CPT | Performed by: OTOLARYNGOLOGY

## 2024-03-05 PROCEDURE — 99214 OFFICE O/P EST MOD 30 MIN: CPT | Performed by: OTOLARYNGOLOGY

## 2024-03-05 PROCEDURE — 1036F TOBACCO NON-USER: CPT | Performed by: OTOLARYNGOLOGY

## 2024-03-05 PROCEDURE — 36415 COLL VENOUS BLD VENIPUNCTURE: CPT

## 2024-03-05 PROCEDURE — 1160F RVW MEDS BY RX/DR IN RCRD: CPT | Performed by: OTOLARYNGOLOGY

## 2024-03-05 PROCEDURE — 1159F MED LIST DOCD IN RCRD: CPT | Performed by: OTOLARYNGOLOGY

## 2024-03-05 PROCEDURE — 80048 BASIC METABOLIC PNL TOTAL CA: CPT

## 2024-03-05 PROCEDURE — 1111F DSCHRG MED/CURRENT MED MERGE: CPT | Performed by: OTOLARYNGOLOGY

## 2024-03-05 NOTE — PROGRESS NOTES
Chief Complaint   Patient presents with    Follow-up     Follow up on sinuses     woke up today with a headache ,last month was in the hospital for covid,   headache off and on since then     HPI:  Ade Fisher is a 68 y.o. female who presents after I saw her last fall.  She did pretty good until she had COVID-19 with other symptoms including a sinus infection.    She did have a normal plain film x-ray of the sinuses on February 26.    She has been getting some headaches at night when she wakes up which go away as the morning goes on.    Chronically congested.    Getting at least 4+ upper respiratory and sinus infections a year.    She does think environmental factors are playing a role.    She uses Flonase and saline flushing      PMH:  Past Medical History:   Diagnosis Date    Chest pain 03/01/2023    Dizziness 03/01/2023    Elevated CPK 03/01/2023    Hyperlipidemia 03/01/2023    Hypertension 03/01/2023    Pain in right hip 12/02/2021    Hip pain, bilateral    Personal history of other diseases of the respiratory system     Personal history of asthma    Personal history of other endocrine, nutritional and metabolic disease     History of hyperlipidemia    Personal history of other specified conditions 03/30/2017    History of snoring    Personal history of urinary (tract) infections 01/18/2022    History of urinary tract infection    Pure hypercholesterolemia 03/01/2023    SOB (shortness of breath) on exertion 03/01/2023    Thyroid nodule 03/01/2023     Past Surgical History:   Procedure Laterality Date    CARPAL TUNNEL RELEASE Bilateral     TONSILLECTOMY           Medications:     Current Outpatient Medications:     albuterol 2.5 mg /3 mL (0.083 %) nebulizer solution, Take 3 mL (2.5 mg) by nebulization 4 times a day. USE 1 UNIT DOSE EVERY 4-6 HOURS AS NEEDED FOR WHEEZING ., Disp: 2400 mL, Rfl: 0    albuterol 90 mcg/actuation inhaler, Inhale 2 puffs every 4 hours if needed for wheezing or shortness of breath.,  "Disp: 18 g, Rfl: 0    budesonide-formoteroL (Symbicort) 160-4.5 mcg/actuation inhaler, Inhale 2 puffs 2 times a day. RINSE MOUTH AFTER USE., Disp: 10.2 g, Rfl: 0    fluticasone (Flonase) 50 mcg/actuation nasal spray, Administer 1 spray into each nostril once daily., Disp: 16 g, Rfl: 1    levocetirizine (Xyzal) 5 mg tablet, Take 1 tablet (5 mg) by mouth once daily., Disp: 90 tablet, Rfl: 0    levothyroxine (Synthroid, Levoxyl) 125 mcg tablet, Take 1 tablet (125 mcg) by mouth once daily., Disp: 90 tablet, Rfl: 0    olmesartan (Benicar) 40 mg tablet, Take 1 tablet (40 mg) by mouth once daily., Disp: 30 tablet, Rfl: 11    pantoprazole (ProtoNix) 40 mg EC tablet, Take 1 tablet (40 mg) by mouth once daily. Do not crush, chew, or split. (Patient taking differently: Take 1 tablet (40 mg) by mouth if needed. Do not crush, chew, or split.), Disp: 90 tablet, Rfl: 0    fluticasone (Cutivate) 0.05 % cream, APPLY AND GENTLY MASSAGE INTO AFFECTED AREA(S) TWICE DAILY (Patient taking differently: Apply 1 Application topically 2 times a day as needed. Apply and gentaly massage into affected area(s) twice daily), Disp: 60 g, Rfl: 1     Allergies:  Allergies   Allergen Reactions    Sulfa (Sulfonamide Antibiotics) Unknown        ROS:  Review of systems normal unless stated otherwise in the HPI and/or PMH.    Physical Exam:  Temperature 36 °C (96.8 °F), height 1.549 m (5' 1\"), weight 76.7 kg (169 lb). Body mass index is 31.93 kg/m².     GENERAL APPEARANCE: Well developed and well nourished.  Alert and oriented in no acute distress.  Normal vocal quality.      HEAD/FACE: No erythema or edema or facial tenderness.  Normal facial nerve function bilaterally.    EAR:       EXTERNAL: Normal pinnas and external auditory canals without lesion or obstructing wax.       MIDDLE EAR: Tympanic membranes intact and mobile with normal landmarks.  Middle ear space appears well aerated.       TUBE STATUS: N/A       MASTOID CAVITY: N/A       HEARING: " Gross hearing assessment is within normal limits.      NOSE:       VISUALIZED USING: Anterior rhinoscopy with headlight and nasal speculum.       DORSUM: Midline, nontraumatic appearance.       MUCOSA: Normal-appearing.       SECRETIONS: Normal.       SEPTUM: Midline and nonobstructing.       INFERIOR TURBINATES: Normal.       MIDDLE TURBINATES/MEATUS: N/A       BLEEDING: N/A         ORAL CAVITY/PHARYNX:       TEETH: Adequate dentition.       TONGUE: No mass or lesion.  Normal mobility.       FLOOR OF MOUTH: No mass or lesion.       PALATE: Normal hard palate, soft palate, and uvula.       OROPHARYNX: Normal without mass or lesion.       BUCCAL MUCOSA/GBS: Normal without mass or lesion.       LIPS: Normal.    LARYNX/HYPOPHARYNX/NASOPHARYNX: N/A    NECK: No palpable masses or abnormal adenopathy.  Trachea is midline.    THYROID: No thyromegaly or palpable nodule.    SALIVARY GLANDS: Normal bilateral parotid and submandibular glands by inspection and palpation.    TMJ's: Normal.    NEURO: Cranial nerve exam grossly normal bilaterally.       Assessment/Plan   Ade was seen today for follow-up.  Diagnoses and all orders for this visit:  Recurrent sinusitis (Primary)  Seasonal and perennial allergic rhinitis  Obstructive sleep apnea  Snoring  Daytime somnolence  RENITA (obstructive sleep apnea)  -     Home sleep apnea test (HSAT)    I wonder if her symptoms are reflective of some obstructive sleep apnea.  In addition to the headache she is having some snoring and some daytime somnolence.  She agrees and will go ahead and check home sleep study.  In regards to her nose and sinuses we did discuss avoidance measures of both allergens and inhalant irritants.  Using [place] pot and Flonase daily.  If she wishes to pursue anything further such as allergy immunotherapy or surgical therapy we would then need to order allergy testing and CT scan of the sinuses.  She defers for now.  We did discuss dust mite and mold avoidance  measures.  Follow up for test results after testing is complete.     Tesfaye Mak MD

## 2024-03-10 ENCOUNTER — CLINICAL SUPPORT (OUTPATIENT)
Dept: SLEEP MEDICINE | Facility: HOSPITAL | Age: 69
End: 2024-03-10
Payer: COMMERCIAL

## 2024-03-10 DIAGNOSIS — G47.33 OSA (OBSTRUCTIVE SLEEP APNEA): Primary | ICD-10-CM

## 2024-03-10 PROCEDURE — 95806 SLEEP STUDY UNATT&RESP EFFT: CPT | Performed by: STUDENT IN AN ORGANIZED HEALTH CARE EDUCATION/TRAINING PROGRAM

## 2024-03-11 ENCOUNTER — HOSPITAL ENCOUNTER (OUTPATIENT)
Dept: RADIOLOGY | Facility: CLINIC | Age: 69
Discharge: HOME | End: 2024-03-11
Payer: COMMERCIAL

## 2024-03-12 ENCOUNTER — OFFICE VISIT (OUTPATIENT)
Dept: OBSTETRICS AND GYNECOLOGY | Facility: CLINIC | Age: 69
End: 2024-03-12
Payer: COMMERCIAL

## 2024-03-12 VITALS
HEIGHT: 61 IN | WEIGHT: 170 LBS | SYSTOLIC BLOOD PRESSURE: 130 MMHG | DIASTOLIC BLOOD PRESSURE: 76 MMHG | BODY MASS INDEX: 32.1 KG/M2

## 2024-03-12 DIAGNOSIS — R10.2 PELVIC PAIN: Primary | ICD-10-CM

## 2024-03-12 DIAGNOSIS — N39.0 URINARY TRACT INFECTION WITHOUT HEMATURIA, SITE UNSPECIFIED: ICD-10-CM

## 2024-03-12 DIAGNOSIS — Z87.440 HISTORY OF UTI: ICD-10-CM

## 2024-03-12 LAB
POC APPEARANCE, URINE: ABNORMAL
POC BILIRUBIN, URINE: NEGATIVE
POC BLOOD, URINE: NEGATIVE
POC COLOR, URINE: YELLOW
POC GLUCOSE, URINE: NEGATIVE MG/DL
POC KETONES, URINE: NEGATIVE MG/DL
POC LEUKOCYTES, URINE: NEGATIVE
POC NITRITE,URINE: NEGATIVE
POC PH, URINE: 7.5 PH
POC PROTEIN, URINE: NEGATIVE MG/DL
POC SPECIFIC GRAVITY, URINE: <=1.005
POC UROBILINOGEN, URINE: 0.2 EU/DL

## 2024-03-12 PROCEDURE — 3075F SYST BP GE 130 - 139MM HG: CPT | Performed by: OBSTETRICS & GYNECOLOGY

## 2024-03-12 PROCEDURE — 1036F TOBACCO NON-USER: CPT | Performed by: OBSTETRICS & GYNECOLOGY

## 2024-03-12 PROCEDURE — 99213 OFFICE O/P EST LOW 20 MIN: CPT | Performed by: OBSTETRICS & GYNECOLOGY

## 2024-03-12 PROCEDURE — 1160F RVW MEDS BY RX/DR IN RCRD: CPT | Performed by: OBSTETRICS & GYNECOLOGY

## 2024-03-12 PROCEDURE — 1159F MED LIST DOCD IN RCRD: CPT | Performed by: OBSTETRICS & GYNECOLOGY

## 2024-03-12 PROCEDURE — 1126F AMNT PAIN NOTED NONE PRSNT: CPT | Performed by: OBSTETRICS & GYNECOLOGY

## 2024-03-12 PROCEDURE — 81003 URINALYSIS AUTO W/O SCOPE: CPT | Performed by: OBSTETRICS & GYNECOLOGY

## 2024-03-12 PROCEDURE — 3078F DIAST BP <80 MM HG: CPT | Performed by: OBSTETRICS & GYNECOLOGY

## 2024-03-12 ASSESSMENT — ENCOUNTER SYMPTOMS
ENDOCRINE NEGATIVE: 1
NEUROLOGICAL NEGATIVE: 1
CARDIOVASCULAR NEGATIVE: 1
MUSCULOSKELETAL NEGATIVE: 1
PSYCHIATRIC NEGATIVE: 1
CONSTITUTIONAL NEGATIVE: 1
EYES NEGATIVE: 1
GASTROINTESTINAL NEGATIVE: 1
HEMATURIA: 0
RESPIRATORY NEGATIVE: 1

## 2024-03-12 NOTE — PROGRESS NOTES
[facility] Department of Urogynecology   Yolie Garcia MD, MPH   [phone]    ASSESSMENT AND PLAN:   68 year old female with AMH, Crow, and pelvic pain. Comorbidities include: HTN and hypothyroidism.     Diagnoses:  #1 Asymptomatic microscopic hematuria  #2 Recurrent UTI  #3 Pelvic pain    Plan:  1. AMH, Crow  - No recent UTI and no recent positive urine cultures within the past year.   - POCT UA negative today.   - Hx of normal cystoscopy on 9/15/2022 and unremarkable kidney U/S with no obstruction at that time to complete AMH work up; will need repeat AMH work up in 2027 if she is still having blood on UA.   - Plan to continue yearly urinalysis for surveillance of AMH.     2. Pelvic pain  - Encouraged her to continue her HEP from PFPT to maintain maximum benefits of reducing her pelvic pain.     Follow up in 1 year with Dr. Yolie Garcia for yearly UA to monitor AMH.     Scribe Attestation  By signing my name below, I, Cruz Garcia, Marco A, attest that this documentation has been prepared under the direction and in the presence of Yolie Garcia MD MPH on 03/12/2024 at 11:29 AM.     Problem List Items Addressed This Visit          Genitourinary and Reproductive    UTI (urinary tract infection)    Relevant Orders    POCT UA Automated manually resulted (Completed)      I spent a total of 20 minutes in face to face and non face to face time.      Yolie Garcia MD, MPH, FACOG     I Dr. Garcia, personally performed the services described in the documentation as scribed in my presence and confirm it is both complete and accurate.  Yolie Garcia MD, MPH, FACOG      Established    HISTORY OF PRESENT ILLNESS:   68 year old female following up on AMH for annual UA with repeat work up in 5 years, Crow, and pelvic pain.     Record Review:   - 3/14/2023 Urine culture: Normal urethral macario  - 3/14/2023 Dr. Yolie Garcia note Re: Crow and hematuria - Normal cystoscopy on 9/15/2022 and normal kidney U/S with  no obstruction. Pelvic pain - Referred her to PFPT. Follow up in 1 year.   - She is a patient of Dr. Plascencia and was seen on 9/15/2022. Hx of hematuria, kidney stones, and RLQ abdominal pain. She had a normal cystoscopy and normal prior bilateral kidney U/S with normal upper tract imaging. She was planning to f/u with Dr. Garcia in 6 months.   - She has a hx of pelvic pain and urinary frequency, for which she was referred to PFPT in July 2022. She declined a medication in July 2022.   - Bilateral kidney U/S on 7/27/2022  IMPRESSION:                                                1. No nephrolithiasis. No obstructive uropathy.                                                       2. Cholelithiasis demonstrated.     Urinary Symptoms:   - No recent UTI and no recent positive urine cultures within the past year.   - Denies experiencing UTI symptoms over the last year.   - She has been drinking 2 tbsp of apple cider vinegar daily and thinks this has improved her symptoms of Crow.     Bowel Symptoms:  - Last month she had severe constipation where she did not have a bowel movement the entire duration of her hospitalization (4 days) despite taking daily MiraLAX. She tried taking a laxative (epsom salts dissolved in water to drink) and endorsed abdominal pain so she returned to the ED for which this was tx with an enema. Her labs showed elevated potassium levels possibly from drinking epsom salts to help stimulate a bowel movement prior to this as she states she has used epsom salts in the past to help resolve her constipation with success.   - Now reports resolved constipation and is having regular bowel movements.     Interval History:  - She reports going to the ED around a month ago on 2/11/2024 for Covid-19; was symptomatic with dehydration, decreased appetite, vomiting, and headache. She overhydrated with excess water during this time. The labs showed hyponatremia of sodium at 122. She was hospitalized for x4  days. However, since that time her sodium levels were rechecked and are back within normal range at 140.     Pelvic Symptoms:  - She went to PFPT and notes this significantly improved her pelvic pain.   - The patient continues her HEP from PFPT to maintain maximum benefits of reducing her pelvic pain but notes she does not need to do her exercises daily as pelvic pain does not occur daily for her.   - Reports keeping her PFPT exercise plan in her drawer and pulls this out when she needs it.       Past Medical History:     Past Medical History:   Diagnosis Date    Chest pain 03/01/2023    Dizziness 03/01/2023    Elevated CPK 03/01/2023    Hyperlipidemia 03/01/2023    Hypertension 03/01/2023    Pain in right hip 12/02/2021    Hip pain, bilateral    Personal history of other diseases of the respiratory system     Personal history of asthma    Personal history of other endocrine, nutritional and metabolic disease     History of hyperlipidemia    Personal history of other specified conditions 03/30/2017    History of snoring    Personal history of urinary (tract) infections 01/18/2022    History of urinary tract infection    Pure hypercholesterolemia 03/01/2023    SOB (shortness of breath) on exertion 03/01/2023    Thyroid nodule 03/01/2023        Past Surgical History:     Past Surgical History:   Procedure Laterality Date    CARPAL TUNNEL RELEASE Bilateral     TONSILLECTOMY         Medications:     Prior to Admission medications    Medication Sig Start Date End Date Taking? Authorizing Provider   albuterol 2.5 mg /3 mL (0.083 %) nebulizer solution Take 3 mL (2.5 mg) by nebulization 4 times a day. USE 1 UNIT DOSE EVERY 4-6 HOURS AS NEEDED FOR WHEEZING . 11/13/23   Jennifer Ellis MD   albuterol 90 mcg/actuation inhaler Inhale 2 puffs every 4 hours if needed for wheezing or shortness of breath. 2/2/24   Jennifer Ellis MD   budesonide-formoteroL (Symbicort) 160-4.5 mcg/actuation inhaler Inhale 2 puffs 2 times a day. RINSE  "MOUTH AFTER USE. 2/2/24   Jennifer Ellis MD   fluticasone (Cutivate) 0.05 % cream APPLY AND GENTLY MASSAGE INTO AFFECTED AREA(S) TWICE DAILY  Patient taking differently: Apply 1 Application topically 2 times a day as needed. Apply and gentaly massage into affected area(s) twice daily 10/19/23   Jennifer Ellis MD   fluticasone (Flonase) 50 mcg/actuation nasal spray Administer 1 spray into each nostril once daily. 11/13/23   Jennifer Ellis MD   levocetirizine (Xyzal) 5 mg tablet Take 1 tablet (5 mg) by mouth once daily. 11/13/23   Jennifer Ellis MD   levothyroxine (Synthroid, Levoxyl) 125 mcg tablet Take 1 tablet (125 mcg) by mouth once daily. 11/13/23 11/12/24  Jennifer Ellis MD   olmesartan (Benicar) 40 mg tablet Take 1 tablet (40 mg) by mouth once daily. 3/1/24 3/1/25  Jaiden Baker MD   pantoprazole (ProtoNix) 40 mg EC tablet Take 1 tablet (40 mg) by mouth once daily. Do not crush, chew, or split.  Patient taking differently: Take 1 tablet (40 mg) by mouth if needed. Do not crush, chew, or split. 11/13/23 3/1/25  Jennifer Ellis MD        ROS  Review of Systems   Constitutional: Negative.    HENT: Negative.     Eyes: Negative.    Respiratory: Negative.     Cardiovascular: Negative.    Gastrointestinal: Negative.    Endocrine: Negative.    Genitourinary: Negative.  Negative for hematuria and pelvic pain.   Musculoskeletal: Negative.    Neurological: Negative.    Psychiatric/Behavioral: Negative.            PHYSICAL EXAM:    /76   Ht 1.549 m (5' 1\")   Wt 77.1 kg (170 lb)   BMI 32.12 kg/m²   No LMP recorded. Patient is postmenopausal.    Declines chaperone for physical exam.      Well developed, well nourished, in no apparent distress.   Neurologic/Psychiatric:  Awake, Alert and Oriented times 3.  Affect normal.       Data and DIAGNOSTIC STUDIES REVIEWED   XR paranasal sinuses 3+ views    Result Date: 2/26/2024  Interpreted By:  Saleem Ibrahim, STUDY: XR PARANASAL SINUSES 3+ VIEWS; ;  2/26/2024 1:01 pm   INDICATION: " Signs/Symptoms:sinus congestion.   COMPARISON: None.   ACCESSION NUMBER(S): [id number]   ORDERING CLINICIAN: LISBETH ROPER   FINDINGS: Sinuses, four views     Paranasal sinuses are clear. There is no air-fluid level. No fracture seen.       No radiographic evidence of sinusitis.     MACRO: None   Signed by: Saleem Ibrahim 2/26/2024 1:10 PM Dictation workstation:   [id number]    ECG 12 lead    Result Date: 2/15/2024  Sinus bradycardia Nonspecific T wave abnormality Abnormal ECG No previous ECGs available Confirmed by David Espino ([id number]) on 2/15/2024 2:10:01 PM    CT abdomen pelvis w IV contrast    Result Date: 2/11/2024  Interpreted By:  Blair Mclain, STUDY: CT ABDOMEN PELVIS W IV CONTRAST; 2/11/2024 4:17 pm   INDICATION: Signs/Symptoms:lower abdominal pain   COMPARISON: March 2022.   ACCESSION NUMBER(S): [id number]   ORDERING CLINICIAN: JIMMY DILLARD   TECHNIQUE: Following intravenous administration of nonionic contrast, spiral axial images were obtained from the dome of the diaphragm through the symphysis pubis. Oral contrast was not administered. Soft tissue and lung windows were evaluated. Sagittal and Coronal reformatted images were also assessed.   All CT examinations are performed with one or more of the following dose reduction techniques: Automated Exposure Control, adjustment of mA and/or kV according to patient size, or use of iterative reconstruction techniques.   FINDINGS: Lung bases: The lung bases are clear. Liver: Normal in size without focal lesions. Gallbladder: Cholelithiasis is again present. Spleen: Normal in size without focal lesions. Pancreas: Unremarkable. Adrenal glands: No focal lesions. Kidneys: Both kidneys excrete contrast symmetrically, without hydronephrosis or solid enhancing masses. Evaluation for renal calculi is limited due to presence of IV contrast.     Few colonic diverticula are again seen, without acute diverticulitis. There is copious amount of fecal  "material in the distal colon and fluid within the dilated proximal colon. No bowel obstruction is seen. The appendix is normal. The bladder is well distended without wall thickening. The aorta is normal in caliber. The SMA, SMV and portal vein are patent. Pelvic reproductive organs: Uterine leiomyoma is again seen, similar to prior exam.       Colonic diverticulosis without acute diverticulitis. Constipation without bowel obstruction. Redemonstration of cholelithiasis and uterine leiomyoma. No acute process in the abdomen and pelvis.   Signed by: Blair Mclain 2/11/2024 4:39 PM Dictation workstation:   [id number]     No results found for: \"URINECULTURE\", \"UAMICCOMM\"   Lab Results   Component Value Date    GLUCOSE 93 03/05/2024    CALCIUM 9.9 03/05/2024     03/05/2024    K 4.8 03/05/2024    CO2 28 03/05/2024     03/05/2024    BUN 15 03/05/2024    CREATININE 0.64 03/05/2024     Lab Results   Component Value Date    WBC 10.9 02/11/2024    HGB 13.6 02/11/2024    HCT 39.9 02/11/2024    MCV 87 02/11/2024     02/11/2024     "

## 2024-03-12 NOTE — PROGRESS NOTES
Subjective   Patient ID: Ade Fisher is a 68 y.o. female who presents for Follow-up.    HPI   Patient is here with complaints of having sinus congestion.  She finished her antibiotic last week but she is getting sinus headaches again having postnasal drainage feeling headaches     Past recap   patient is here for ER follow-up.  She has COVID and she was drinking a lot of fluid became very weak.  In the hospital she was found to have low sodium.  She was treated for COVID hyponatremia hypertension sinusitis.  She ended up in the ER again because she got constipated with abdominal cramping.  In the hospital given GI cocktail and her symptoms resolved.  She is feeling much better now      Past recap  patient is here for follow-up hypertension high cholesterol hypothyroidism  Not taking statin  Follow-up on hypothyroidism  Did blood work  Over the weekend having fleeting chest pains a lot of stress not sleeping heart was beating weird        Follow-up on hypertension high cholesterol hypothyroidism  Did blood work  She has to go off the statins because it gave her severe muscle pain,  Wants refill on allergy medications  Needs refill on eczema cream  Her asthma is flaring up as she moved to new house and lot of work of remodeling going on refill of nebulizer solution  She feels her ears stuffy      Patient presents with complaints of urinary symptoms having some frequency dark urine and back pain over the weekend  She had colonoscopy done couple of months ago by Dr. Chris diagnosed with diverticular disease     She did not do sleep study     Follow-up on hypertension high cholesterol hypothyroidism  Needs medication refill  Did blood work  She feels very tired because she does not get enough sleep  She does snore but does not think she has sleep apnea did not do sleep study  She is off the statins could not tolerate the side effects  She was on vacation and was not watching her diet     Nonsmoker nondrinker  Family  "history mother  of lung cancer father  of colon cancer at 85 grandmother had stroke  Review of Systems    Objective   /72   Ht 1.575 m (5' 2\")   Wt 76.7 kg (169 lb)   BMI 30.91 kg/m²     Physical Exam  Vitals reviewed.   Constitutional:       Appearance: Normal appearance.   HENT:      Head: Normocephalic and atraumatic.      Right Ear: Tympanic membrane, ear canal and external ear normal.      Left Ear: Tympanic membrane, ear canal and external ear normal.      Nose: Nose normal.      Mouth/Throat:      Pharynx: Oropharynx is clear.   Eyes:      Extraocular Movements: Extraocular movements intact.      Conjunctiva/sclera: Conjunctivae normal.      Pupils: Pupils are equal, round, and reactive to light.   Cardiovascular:      Rate and Rhythm: Normal rate and regular rhythm.      Pulses: Normal pulses.      Heart sounds: Normal heart sounds.   Pulmonary:      Effort: Pulmonary effort is normal.      Breath sounds: Normal breath sounds.   Abdominal:      General: Abdomen is flat. Bowel sounds are normal.      Palpations: Abdomen is soft.   Musculoskeletal:      Cervical back: Normal range of motion and neck supple.   Skin:     General: Skin is warm and dry.   Neurological:      General: No focal deficit present.      Mental Status: She is alert and oriented to person, place, and time.   Psychiatric:         Mood and Affect: Mood normal.         Assessment/Plan   Problem List Items Addressed This Visit             ICD-10-CM       ENT    Sinus congestion R09.81    Relevant Orders    XR paranasal sinuses 3+ views (Completed)   Past recap  Blood pressure is stable  Will decrease levothyroxine 100 mcg  Ultrasound thyroid for the thyroid nodule yearly follow-up  Cholesterol is extremely high but patient does not want to take medications  She understands risks  She is cannot do it with diet and exercise  For hematuria she saw the urologist cystoscopy normal  Patient thinks that years she was going through a " lot of stress and anxiety could have been contributing to a lot of her symptoms  Follow-up blood work in 3 months     3/6/23  Patient tympanic membrane clear  She has some tenderness in the TMJ  Flonase nasal spray to help with the eustachian tube dysfunction  Blood work reviewed  TSH little elevated I think patient was missing few dosages which she accepted  We will continue levothyroxine 100 mcg  Patient is again refusing to take statins for high cholesterol  We will do CT cardiac scoring to assess the risk  Follow-up blood work in 6 months     6/7/2023  Treat with Zithromax for 10 days  Flonase nasal spray  Albuterol inhaler  Plenty of fluids  Follow-up if not better  I am wondering if patient has underlying allergies causing the symptoms  Advised to follow-up if not better     6/23/2023  I am currently not convinced if patient has sinus infection  I think patient is having rhinitis and allergies  Advised to use Flonase twice a day  Use Claritin-D  If not better then only try antibiotic  Also offered patient to do CAT scan of the sinuses but she wants to wait     8/15/2023     Patient has mild eustachian tube dysfunction on the right side  Sinuses passages clean  I am wondering if patient has acid reflux causing chest congestion  Treat with Protonix Z-Jez and Medrol Dosepak  We will do CT of the sinuses for recurrent sinusitis  Keep her follow-up with the ENT     11/13/2023  Blood work reviewed  TSH elevated at 8.64  Increase levothyroxine 125 mcg  Cholesterol is high   Patient is intolerant to statins and does not want to take it  She will do with diet and exercise  Cholesterol diet chart given  LFT still elevated  Again emphasized low-carb diet  Follow-up blood work in 3 months     1/11/2024  EKG done shows normal sinus rhythm  Troponin ordered  Blood work reviewed all in normal range  Continue current medications  Advised to go to the emergency room if chest pain happens again  Stress test done in 2019  was normal  Patient has not done CT cardiac scoring advised to do  Discussed diet exercise and lifestyle modification to    2/13/2024  Hospital records reviewed ER records reviewed  Advised patient to cut down nonessential liquids and diet.  Cut down soups  Cut down soft drinks  BMP q. weekly for 4 times  Routine follow-up    2/26/2024  Clinically patient does not look like she has any sinus congestion  Will get x-ray sinuses to see if she needs antibiotics or if that headache giving her symptoms  Augmentin pending the x-ray results    Addendum x-ray of sinuses does not show any infection  Advised patient not to take medication  Treat headache with anti-inflammatory

## 2024-04-10 ENCOUNTER — TELEPHONE (OUTPATIENT)
Dept: OTOLARYNGOLOGY | Facility: CLINIC | Age: 69
End: 2024-04-10
Payer: COMMERCIAL

## 2024-04-10 NOTE — TELEPHONE ENCOUNTER
Pt is going to check with her  on what DME he uses and will call me back  to set up with that company.

## 2024-06-18 ENCOUNTER — OFFICE VISIT (OUTPATIENT)
Dept: PRIMARY CARE | Facility: CLINIC | Age: 69
End: 2024-06-18
Payer: COMMERCIAL

## 2024-06-18 VITALS
DIASTOLIC BLOOD PRESSURE: 66 MMHG | BODY MASS INDEX: 33.79 KG/M2 | HEIGHT: 61 IN | WEIGHT: 179 LBS | SYSTOLIC BLOOD PRESSURE: 118 MMHG

## 2024-06-18 DIAGNOSIS — R09.81 SINUS CONGESTION: ICD-10-CM

## 2024-06-18 DIAGNOSIS — E78.2 MIXED HYPERLIPIDEMIA: ICD-10-CM

## 2024-06-18 DIAGNOSIS — M54.2 NECK PAIN: ICD-10-CM

## 2024-06-18 DIAGNOSIS — E03.8 OTHER SPECIFIED HYPOTHYROIDISM: ICD-10-CM

## 2024-06-18 DIAGNOSIS — R05.1 ACUTE COUGH: ICD-10-CM

## 2024-06-18 DIAGNOSIS — I10 PRIMARY HYPERTENSION: ICD-10-CM

## 2024-06-18 DIAGNOSIS — E03.9 HYPOTHYROIDISM, UNSPECIFIED TYPE: ICD-10-CM

## 2024-06-18 DIAGNOSIS — J30.1 ALLERGIC RHINITIS DUE TO POLLEN, UNSPECIFIED SEASONALITY: ICD-10-CM

## 2024-06-18 DIAGNOSIS — J30.89 ALLERGIC RHINITIS DUE TO OTHER ALLERGIC TRIGGER, UNSPECIFIED SEASONALITY: ICD-10-CM

## 2024-06-18 DIAGNOSIS — U07.1 COVID-19: ICD-10-CM

## 2024-06-18 DIAGNOSIS — R06.02 SOB (SHORTNESS OF BREATH) ON EXERTION: ICD-10-CM

## 2024-06-18 PROBLEM — R40.0 DAYTIME SOMNOLENCE: Status: ACTIVE | Noted: 2024-06-18

## 2024-06-18 PROBLEM — G47.33 OBSTRUCTIVE SLEEP APNEA SYNDROME: Status: ACTIVE | Noted: 2024-06-18

## 2024-06-18 PROBLEM — J30.2 PERENNIAL ALLERGIC RHINITIS WITH SEASONAL VARIATION: Status: ACTIVE | Noted: 2024-06-18

## 2024-06-18 PROCEDURE — 3074F SYST BP LT 130 MM HG: CPT | Performed by: INTERNAL MEDICINE

## 2024-06-18 PROCEDURE — 3078F DIAST BP <80 MM HG: CPT | Performed by: INTERNAL MEDICINE

## 2024-06-18 PROCEDURE — 1159F MED LIST DOCD IN RCRD: CPT | Performed by: INTERNAL MEDICINE

## 2024-06-18 PROCEDURE — 99214 OFFICE O/P EST MOD 30 MIN: CPT | Performed by: INTERNAL MEDICINE

## 2024-06-18 NOTE — PROGRESS NOTES
Subjective   Patient ID: Ade Fisher is a 68 y.o. female who presents for Neck Pain and Med Refill.    HPI   Patient is here for follow-up on hypertension hypothyroidism, high cholesterol  Complaining of neck pain.  She was painting a week ago now her neck hurts right upper arm hurts having difficulty sleeping    Past recap  Patient is here with complaints of having sinus congestion.  She finished her antibiotic last week but she is getting sinus headaches again having postnasal drainage feeling headaches     patient is here for ER follow-up.  She has COVID and she was drinking a lot of fluid became very weak.  In the hospital she was found to have low sodium.  She was treated for COVID hyponatremia hypertension sinusitis.  She ended up in the ER again because she got constipated with abdominal cramping.  In the hospital given GI cocktail and her symptoms resolved.  She is feeling much better now      patient is here for follow-up hypertension high cholesterol hypothyroidism  Not taking statin  Follow-up on hypothyroidism  Did blood work  Over the weekend having fleeting chest pains a lot of stress not sleeping heart was beating weird        Follow-up on hypertension high cholesterol hypothyroidism  Did blood work  She has to go off the statins because it gave her severe muscle pain,  Wants refill on allergy medications  Needs refill on eczema cream  Her asthma is flaring up as she moved to new house and lot of work of remodeling going on refill of nebulizer solution  She feels her ears stuffy      Patient presents with complaints of urinary symptoms having some frequency dark urine and back pain over the weekend  She had colonoscopy done couple of months ago by Dr. Chris diagnosed with diverticular disease     She did not do sleep study     Follow-up on hypertension high cholesterol hypothyroidism  Needs medication refill  Did blood work  She feels very tired because she does not get enough sleep  She does  "snore but does not think she has sleep apnea did not do sleep study  She is off the statins could not tolerate the side effects  She was on vacation and was not watching her diet     Nonsmoker nondrinker  Family history mother  of lung cancer father  of colon cancer at 85 grandmother had stroke  Review of Systems    Objective   /66   Ht 1.549 m (5' 1\")   Wt 81.2 kg (179 lb)   BMI 33.82 kg/m²     Physical Exam  Vitals reviewed.   Constitutional:       Appearance: Normal appearance.   HENT:      Head: Normocephalic and atraumatic.      Right Ear: Tympanic membrane, ear canal and external ear normal.      Left Ear: Tympanic membrane, ear canal and external ear normal.      Nose: Nose normal.      Mouth/Throat:      Pharynx: Oropharynx is clear.   Eyes:      Extraocular Movements: Extraocular movements intact.      Conjunctiva/sclera: Conjunctivae normal.      Pupils: Pupils are equal, round, and reactive to light.   Cardiovascular:      Rate and Rhythm: Normal rate and regular rhythm.      Pulses: Normal pulses.      Heart sounds: Normal heart sounds.   Pulmonary:      Effort: Pulmonary effort is normal.      Breath sounds: Normal breath sounds.   Abdominal:      General: Abdomen is flat. Bowel sounds are normal.      Palpations: Abdomen is soft.   Musculoskeletal:         General: Tenderness present.      Cervical back: Normal range of motion and neck supple.      Comments: Tenderness in neck muscles and upper Shoulder muscles   Skin:     General: Skin is warm and dry.   Neurological:      General: No focal deficit present.      Mental Status: She is alert and oriented to person, place, and time.   Psychiatric:         Mood and Affect: Mood normal.         Assessment/Plan   Problem List Items Addressed This Visit             ICD-10-CM       Cardiac and Vasculature    Hyperlipidemia E78.5    Relevant Orders    Comprehensive Metabolic Panel (Completed)    Lipid Panel (Completed)    Hypertension I10    " Relevant Medications    olmesartan (Benicar) 40 mg tablet       ENT    Allergic rhinitis J30.9    Relevant Medications    levocetirizine (Xyzal) 5 mg tablet    fluticasone (Cutivate) 0.05 % cream    Sinus congestion R09.81    Relevant Medications    albuterol 90 mcg/actuation inhaler       Endocrine/Metabolic    Hypothyroidism E03.9    Relevant Medications    levothyroxine (Synthroid, Levoxyl) 125 mcg tablet    Other Relevant Orders    CBC (Completed)    Comprehensive Metabolic Panel (Completed)    Thyroid Stimulating Hormone (Completed)       Musculoskeletal and Injuries    Neck pain M54.2    Relevant Medications    nabumetone (Relafen) 750 mg tablet    cyclobenzaprine (Flexeril) 10 mg tablet       Pulmonary and Pneumonias    Cough R05.9    Relevant Medications    fluticasone (Flonase) 50 mcg/actuation nasal spray    budesonide-formoteroL (Symbicort) 160-4.5 mcg/actuation inhaler     Past recap  Blood pressure is stable  Will decrease levothyroxine 100 mcg  Ultrasound thyroid for the thyroid nodule yearly follow-up  Cholesterol is extremely high but patient does not want to take medications  She understands risks  She is cannot do it with diet and exercise  For hematuria she saw the urologist cystoscopy normal  Patient thinks that years she was going through a lot of stress and anxiety could have been contributing to a lot of her symptoms  Follow-up blood work in 3 months     3/6/23  Patient tympanic membrane clear  She has some tenderness in the TMJ  Flonase nasal spray to help with the eustachian tube dysfunction  Blood work reviewed  TSH little elevated I think patient was missing few dosages which she accepted  We will continue levothyroxine 100 mcg  Patient is again refusing to take statins for high cholesterol  We will do CT cardiac scoring to assess the risk  Follow-up blood work in 6 months        6/23/2023  I am currently not convinced if patient has sinus infection  I think patient is having rhinitis and  allergies  Advised to use Flonase twice a day  Use Claritin-D  If not better then only try antibiotic  Also offered patient to do CAT scan of the sinuses but she wants to wait     8/15/2023     Patient has mild eustachian tube dysfunction on the right side  Sinuses passages clean  I am wondering if patient has acid reflux causing chest congestion  Treat with Protonix Z-Jez and Medrol Dosepak  We will do CT of the sinuses for recurrent sinusitis  Keep her follow-up with the ENT     11/13/2023  Blood work reviewed  TSH elevated at 8.64  Increase levothyroxine 125 mcg  Cholesterol is high   Patient is intolerant to statins and does not want to take it  She will do with diet and exercise  Cholesterol diet chart given  LFT still elevated  Again emphasized low-carb diet  Follow-up blood work in 3 months     1/11/2024  EKG done shows normal sinus rhythm  Troponin ordered  Blood work reviewed all in normal range  Continue current medications  Advised to go to the emergency room if chest pain happens again  Stress test done in 2019 was normal  Patient has not done CT cardiac scoring advised to do  Discussed diet exercise and lifestyle modification to    2/13/2024  Hospital records reviewed ER records reviewed  Advised patient to cut down nonessential liquids and diet.  Cut down soups  Cut down soft drinks  BMP q. weekly for 4 times  Routine follow-up    2/26/2024  Clinically patient does not look like she has any sinus congestion  Will get x-ray sinuses to see if she needs antibiotics or if that headache giving her symptoms  Augmentin pending the x-ray results  Addendum x-ray of sinuses does not show any infection  Advised patient not to take medication  Treat headache with anti-inflammatory    6/18/2024  Neck pain most likely from neck spasm  Treat with Relafen and Flexeril  Heat stretching exercises ice  Blood pressure stable  Blood work ordered  Medications refilled

## 2024-06-20 ENCOUNTER — LAB (OUTPATIENT)
Dept: LAB | Facility: LAB | Age: 69
End: 2024-06-20
Payer: COMMERCIAL

## 2024-06-20 DIAGNOSIS — E78.2 MIXED HYPERLIPIDEMIA: ICD-10-CM

## 2024-06-20 DIAGNOSIS — E03.9 HYPOTHYROIDISM, UNSPECIFIED TYPE: ICD-10-CM

## 2024-06-20 LAB
ALBUMIN SERPL BCP-MCNC: 4.2 G/DL (ref 3.4–5)
ALP SERPL-CCNC: 91 U/L (ref 33–136)
ALT SERPL W P-5'-P-CCNC: 17 U/L (ref 7–45)
ANION GAP SERPL CALC-SCNC: 13 MMOL/L (ref 10–20)
AST SERPL W P-5'-P-CCNC: 19 U/L (ref 9–39)
BILIRUB SERPL-MCNC: 0.5 MG/DL (ref 0–1.2)
BUN SERPL-MCNC: 14 MG/DL (ref 6–23)
CALCIUM SERPL-MCNC: 10.2 MG/DL (ref 8.6–10.6)
CHLORIDE SERPL-SCNC: 102 MMOL/L (ref 98–107)
CHOLEST SERPL-MCNC: 220 MG/DL (ref 0–199)
CHOLESTEROL/HDL RATIO: 4.3
CO2 SERPL-SCNC: 27 MMOL/L (ref 21–32)
CREAT SERPL-MCNC: 0.71 MG/DL (ref 0.5–1.05)
EGFRCR SERPLBLD CKD-EPI 2021: >90 ML/MIN/1.73M*2
ERYTHROCYTE [DISTWIDTH] IN BLOOD BY AUTOMATED COUNT: 12.2 % (ref 11.5–14.5)
GLUCOSE SERPL-MCNC: 97 MG/DL (ref 74–99)
HCT VFR BLD AUTO: 41.4 % (ref 36–46)
HDLC SERPL-MCNC: 51.4 MG/DL
HGB BLD-MCNC: 13.5 G/DL (ref 12–16)
LDLC SERPL CALC-MCNC: 138 MG/DL
MCH RBC QN AUTO: 29.1 PG (ref 26–34)
MCHC RBC AUTO-ENTMCNC: 32.6 G/DL (ref 32–36)
MCV RBC AUTO: 89 FL (ref 80–100)
NON HDL CHOLESTEROL: 169 MG/DL (ref 0–149)
NRBC BLD-RTO: 0 /100 WBCS (ref 0–0)
PLATELET # BLD AUTO: 332 X10*3/UL (ref 150–450)
POTASSIUM SERPL-SCNC: 4.6 MMOL/L (ref 3.5–5.3)
PROT SERPL-MCNC: 7.4 G/DL (ref 6.4–8.2)
RBC # BLD AUTO: 4.64 X10*6/UL (ref 4–5.2)
SODIUM SERPL-SCNC: 137 MMOL/L (ref 136–145)
TRIGL SERPL-MCNC: 153 MG/DL (ref 0–149)
TSH SERPL-ACNC: 0.48 MIU/L (ref 0.44–3.98)
VLDL: 31 MG/DL (ref 0–40)
WBC # BLD AUTO: 4.1 X10*3/UL (ref 4.4–11.3)

## 2024-06-20 PROCEDURE — 80061 LIPID PANEL: CPT

## 2024-06-20 PROCEDURE — 84443 ASSAY THYROID STIM HORMONE: CPT

## 2024-06-20 PROCEDURE — 85027 COMPLETE CBC AUTOMATED: CPT

## 2024-06-20 PROCEDURE — 36415 COLL VENOUS BLD VENIPUNCTURE: CPT

## 2024-06-20 PROCEDURE — 80053 COMPREHEN METABOLIC PANEL: CPT

## 2024-06-20 RX ORDER — NABUMETONE 750 MG/1
750 TABLET, FILM COATED ORAL 2 TIMES DAILY
Qty: 30 TABLET | Refills: 0 | Status: SHIPPED | OUTPATIENT
Start: 2024-06-20 | End: 2025-06-20

## 2024-06-20 RX ORDER — OLMESARTAN MEDOXOMIL 40 MG/1
40 TABLET ORAL DAILY
Qty: 90 TABLET | Refills: 1 | Status: SHIPPED | OUTPATIENT
Start: 2024-06-20 | End: 2025-06-20

## 2024-06-20 RX ORDER — LEVOCETIRIZINE DIHYDROCHLORIDE 5 MG/1
5 TABLET, FILM COATED ORAL DAILY
Qty: 90 TABLET | Refills: 1 | Status: SHIPPED | OUTPATIENT
Start: 2024-06-20

## 2024-06-20 RX ORDER — BUDESONIDE AND FORMOTEROL FUMARATE DIHYDRATE 160; 4.5 UG/1; UG/1
2 AEROSOL RESPIRATORY (INHALATION)
Qty: 10.2 G | Refills: 1 | Status: SHIPPED | OUTPATIENT
Start: 2024-06-20

## 2024-06-20 RX ORDER — FLUTICASONE PROPIONATE 0.5 MG/G
1 CREAM TOPICAL 2 TIMES DAILY
Qty: 60 G | Refills: 1 | Status: SHIPPED | OUTPATIENT
Start: 2024-06-20

## 2024-06-20 RX ORDER — CYCLOBENZAPRINE HCL 10 MG
10 TABLET ORAL NIGHTLY PRN
Qty: 30 TABLET | Refills: 0 | Status: SHIPPED | OUTPATIENT
Start: 2024-06-20 | End: 2024-08-19

## 2024-06-20 RX ORDER — ALBUTEROL SULFATE 90 UG/1
2 AEROSOL, METERED RESPIRATORY (INHALATION) EVERY 4 HOURS PRN
Qty: 18 G | Refills: 1 | Status: SHIPPED | OUTPATIENT
Start: 2024-06-20

## 2024-06-20 RX ORDER — FLUTICASONE PROPIONATE 50 MCG
1 SPRAY, SUSPENSION (ML) NASAL DAILY
Qty: 16 G | Refills: 1 | Status: SHIPPED | OUTPATIENT
Start: 2024-06-20

## 2024-06-20 RX ORDER — LEVOTHYROXINE SODIUM 125 UG/1
125 TABLET ORAL DAILY
Qty: 90 TABLET | Refills: 1 | Status: SHIPPED | OUTPATIENT
Start: 2024-06-20 | End: 2025-06-20

## 2024-09-04 DIAGNOSIS — E78.2 MIXED HYPERLIPIDEMIA: ICD-10-CM

## 2024-09-04 DIAGNOSIS — E03.8 OTHER SPECIFIED HYPOTHYROIDISM: ICD-10-CM

## 2024-09-04 DIAGNOSIS — I10 PRIMARY HYPERTENSION: ICD-10-CM

## 2024-09-10 ENCOUNTER — APPOINTMENT (OUTPATIENT)
Dept: OBSTETRICS AND GYNECOLOGY | Facility: CLINIC | Age: 69
End: 2024-09-10
Payer: COMMERCIAL

## 2024-09-10 DIAGNOSIS — Z12.31 ENCOUNTER FOR SCREENING MAMMOGRAM FOR BREAST CANCER: ICD-10-CM

## 2024-09-15 NOTE — PROGRESS NOTES
Annual-menopause  Subjective   Ade Fisher is a 68 y.o. female who is here for a routine exam.   Complaints:   denies vag bleed or discharge; denies pelvic  pain, pressure, or persistent bloating.  Last seen 11/2022 for evaluation of a small amount of tannish vaginal discharge seen last week with wiping; patient denies any ava blood or pelvic pain; she denies pelvic pressure or persistent bloating.         She has completed  UroGyn eval with  specialists Dr. Arita and Radha where cystoscopy was for performed to complete her workup for recurrent urinary tract infection symptoms, and only trace but persistent hematuria; patient reports the scope no abnormal findings. She was then prescribed  PFPT. I have previously counseled patient extensively on genitourinary syndrome of menopause; today we again review atrophic changes of mucosal tissue that can lead to superficial abrasion and small amounts of spotting and or detectable blood in the urine.         Ade completed a full hysteroscopic evaluation this summer which show global atrophy of the endometrium, and a partially submucosal fibroid that could not safely be resected through a hysteroscopic route.  Past Medical History:   Diagnosis Date    Chest pain 03/01/2023    Dizziness 03/01/2023    Elevated CPK 03/01/2023    Hyperlipidemia 03/01/2023    Hypertension 03/01/2023    Pain in right hip 12/02/2021    Hip pain, bilateral    Personal history of other diseases of the respiratory system     Personal history of asthma    Personal history of other endocrine, nutritional and metabolic disease     History of hyperlipidemia    Personal history of other specified conditions 03/30/2017    History of snoring    Personal history of urinary (tract) infections 01/18/2022    History of urinary tract infection    Pure hypercholesterolemia 03/01/2023    SOB (shortness of breath) on exertion 03/01/2023    Thyroid nodule 03/01/2023    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  No  Tobacco.No Alcohol.  Last pap 05/26/2022-neg, 09/11/2014-neg,hpv-neg, 10/19/2011 NEG ; 10/07/2010 NEG, NGE HPV.   Mamm 10/02/2020-neg.   Pelvic US 4/22/22 completed at [facility]: 6.6. x 3.8 x 5.5 cm uterus; 3cm fibroid RT wall/1.2cm fibroid LT wall; 2.2cm polyp inside enometrial cavity;both ovaries seen/each 2cm.   Menarche 12.  Last Period MALACHI.   STDs none.  not currently sexually active.   Total preg  0.   2 ADOPTED DAUGHTERS ; CARRIE AND LISA.   Review of Systems   Constitutional:  Negative for activity change, fatigue and unexpected weight change.   Respiratory:  Negative for chest tightness and shortness of breath.    Cardiovascular:  Negative for chest pain and leg swelling.   Gastrointestinal:  Negative for abdominal distention and abdominal pain.   Genitourinary:  Negative for difficulty urinating, dysuria, genital sores, pelvic pain, vaginal bleeding, vaginal discharge and vaginal pain.   Musculoskeletal:  Negative for gait problem and joint swelling.   Skin:  Negative for color change and rash.   Neurological:  Negative for dizziness, weakness and headaches.   Hematological:  Negative for adenopathy.   Objective   There were no vitals taken for this visit.   General:   Alert and oriented, in no acute distress   Neck: Supple. No visible thyromegaly.    Breast/Axilla: Normal to palpation bilaterally without masses, skin changes, or nipple discharge.    Abdomen: Soft, non-tender, without masses or organomegaly   Vulva: Normal architecture without erythema, masses, or lesions.    Vagina: Normal mucosa without lesions, masses.  Positive atrophy. No abnormal vaginal discharge.    Cervix: Normal without masses, lesions, or signs of cervicitis.    Uterus: Normal mobile, non-enlarged uterus    Adnexa: Normal without masses or lesions   Pelvic Floor No POP noted. No high tone pelvic floor    Psych  Rectal Normal affect. Normal mood.   Normal stool, no masses, guaiac negative   Assessment/Plan       Liudmila BENSON  MD Tuan

## 2024-09-16 ASSESSMENT — ENCOUNTER SYMPTOMS
SHORTNESS OF BREATH: 0
ADENOPATHY: 0
DIFFICULTY URINATING: 0
ABDOMINAL DISTENTION: 0
HEADACHES: 0
CHEST TIGHTNESS: 0
DYSURIA: 0
DIZZINESS: 0
UNEXPECTED WEIGHT CHANGE: 0
FATIGUE: 0
ACTIVITY CHANGE: 0
ABDOMINAL PAIN: 0
JOINT SWELLING: 0
WEAKNESS: 0
COLOR CHANGE: 0

## 2024-09-17 ENCOUNTER — APPOINTMENT (OUTPATIENT)
Dept: OBSTETRICS AND GYNECOLOGY | Facility: CLINIC | Age: 69
End: 2024-09-17
Payer: COMMERCIAL

## 2024-09-17 ENCOUNTER — TELEPHONE (OUTPATIENT)
Dept: OBSTETRICS AND GYNECOLOGY | Facility: CLINIC | Age: 69
End: 2024-09-17
Payer: COMMERCIAL

## 2024-09-17 DIAGNOSIS — Z78.0 MENOPAUSE: ICD-10-CM

## 2024-09-17 DIAGNOSIS — M81.0 POSTMENOPAUSAL BONE LOSS: ICD-10-CM

## 2024-09-17 DIAGNOSIS — Z12.31 ENCOUNTER FOR SCREENING MAMMOGRAM FOR MALIGNANT NEOPLASM OF BREAST: ICD-10-CM

## 2024-09-17 DIAGNOSIS — Z01.419 VISIT FOR GYNECOLOGIC EXAMINATION: Primary | ICD-10-CM

## 2024-09-17 DIAGNOSIS — N95.2 POSTMENOPAUSAL ATROPHIC VAGINITIS: ICD-10-CM

## 2024-09-17 DIAGNOSIS — Z12.11 SCREEN FOR COLON CANCER: ICD-10-CM

## 2024-09-17 NOTE — TELEPHONE ENCOUNTER
Est pt called office needing to r/s annual appt today due to daughter in ER / Pt resched to 08/08/2024 pt wanting Dr Dickerson to view CT scan from 02/11/2024 as a fibroid was noted / advised message to Dr Dickerson

## 2024-09-20 ENCOUNTER — LAB (OUTPATIENT)
Dept: LAB | Facility: LAB | Age: 69
End: 2024-09-20
Payer: COMMERCIAL

## 2024-09-20 DIAGNOSIS — E03.8 OTHER SPECIFIED HYPOTHYROIDISM: ICD-10-CM

## 2024-09-20 DIAGNOSIS — I10 PRIMARY HYPERTENSION: ICD-10-CM

## 2024-09-20 DIAGNOSIS — E78.2 MIXED HYPERLIPIDEMIA: ICD-10-CM

## 2024-09-20 PROBLEM — E87.1 HYPO-OSMOLALITY AND HYPONATREMIA: Status: ACTIVE | Noted: 2024-09-20

## 2024-09-20 LAB
ALBUMIN SERPL BCP-MCNC: 4.4 G/DL (ref 3.4–5)
ALP SERPL-CCNC: 99 U/L (ref 33–136)
ALT SERPL W P-5'-P-CCNC: 17 U/L (ref 7–45)
ANION GAP SERPL CALC-SCNC: 11 MMOL/L (ref 10–20)
AST SERPL W P-5'-P-CCNC: 20 U/L (ref 9–39)
BILIRUB SERPL-MCNC: 0.6 MG/DL (ref 0–1.2)
BUN SERPL-MCNC: 13 MG/DL (ref 6–23)
CALCIUM SERPL-MCNC: 10.1 MG/DL (ref 8.6–10.6)
CHLORIDE SERPL-SCNC: 100 MMOL/L (ref 98–107)
CHOLEST SERPL-MCNC: 256 MG/DL (ref 0–199)
CHOLESTEROL/HDL RATIO: 4.8
CO2 SERPL-SCNC: 30 MMOL/L (ref 21–32)
CREAT SERPL-MCNC: 0.66 MG/DL (ref 0.5–1.05)
EGFRCR SERPLBLD CKD-EPI 2021: >90 ML/MIN/1.73M*2
ERYTHROCYTE [DISTWIDTH] IN BLOOD BY AUTOMATED COUNT: 12.6 % (ref 11.5–14.5)
GLUCOSE SERPL-MCNC: 99 MG/DL (ref 74–99)
HCT VFR BLD AUTO: 40.4 % (ref 36–46)
HDLC SERPL-MCNC: 53.2 MG/DL
HGB BLD-MCNC: 13.4 G/DL (ref 12–16)
LDLC SERPL CALC-MCNC: 178 MG/DL
MCH RBC QN AUTO: 29.1 PG (ref 26–34)
MCHC RBC AUTO-ENTMCNC: 33.2 G/DL (ref 32–36)
MCV RBC AUTO: 88 FL (ref 80–100)
NON HDL CHOLESTEROL: 203 MG/DL (ref 0–149)
NRBC BLD-RTO: 0 /100 WBCS (ref 0–0)
PLATELET # BLD AUTO: 338 X10*3/UL (ref 150–450)
POTASSIUM SERPL-SCNC: 4.7 MMOL/L (ref 3.5–5.3)
PROT SERPL-MCNC: 7.3 G/DL (ref 6.4–8.2)
RBC # BLD AUTO: 4.6 X10*6/UL (ref 4–5.2)
SODIUM SERPL-SCNC: 136 MMOL/L (ref 136–145)
TRIGL SERPL-MCNC: 125 MG/DL (ref 0–149)
TSH SERPL-ACNC: 0.81 MIU/L (ref 0.44–3.98)
VLDL: 25 MG/DL (ref 0–40)
WBC # BLD AUTO: 4.2 X10*3/UL (ref 4.4–11.3)

## 2024-09-20 PROCEDURE — 80061 LIPID PANEL: CPT

## 2024-09-20 PROCEDURE — 84443 ASSAY THYROID STIM HORMONE: CPT

## 2024-09-20 PROCEDURE — 85027 COMPLETE CBC AUTOMATED: CPT

## 2024-09-20 PROCEDURE — 80053 COMPREHEN METABOLIC PANEL: CPT

## 2024-09-20 PROCEDURE — 36415 COLL VENOUS BLD VENIPUNCTURE: CPT

## 2024-09-23 ENCOUNTER — APPOINTMENT (OUTPATIENT)
Dept: CARDIOLOGY | Facility: CLINIC | Age: 69
End: 2024-09-23
Payer: COMMERCIAL

## 2024-09-23 VITALS
BODY MASS INDEX: 33.13 KG/M2 | DIASTOLIC BLOOD PRESSURE: 74 MMHG | RESPIRATION RATE: 16 BRPM | OXYGEN SATURATION: 95 % | HEART RATE: 81 BPM | WEIGHT: 180 LBS | HEIGHT: 62 IN | TEMPERATURE: 98 F | SYSTOLIC BLOOD PRESSURE: 138 MMHG

## 2024-09-23 DIAGNOSIS — E78.00 PURE HYPERCHOLESTEROLEMIA: ICD-10-CM

## 2024-09-23 DIAGNOSIS — E78.2 MIXED HYPERLIPIDEMIA: Primary | ICD-10-CM

## 2024-09-23 DIAGNOSIS — G47.33 OBSTRUCTIVE SLEEP APNEA SYNDROME: ICD-10-CM

## 2024-09-23 DIAGNOSIS — I10 PRIMARY HYPERTENSION: ICD-10-CM

## 2024-09-23 DIAGNOSIS — E66.09 CLASS 1 OBESITY DUE TO EXCESS CALORIES WITHOUT SERIOUS COMORBIDITY WITH BODY MASS INDEX (BMI) OF 30.0 TO 30.9 IN ADULT: ICD-10-CM

## 2024-09-23 PROCEDURE — 1159F MED LIST DOCD IN RCRD: CPT | Performed by: INTERNAL MEDICINE

## 2024-09-23 PROCEDURE — 3078F DIAST BP <80 MM HG: CPT | Performed by: INTERNAL MEDICINE

## 2024-09-23 PROCEDURE — 1036F TOBACCO NON-USER: CPT | Performed by: INTERNAL MEDICINE

## 2024-09-23 PROCEDURE — 1126F AMNT PAIN NOTED NONE PRSNT: CPT | Performed by: INTERNAL MEDICINE

## 2024-09-23 PROCEDURE — 1160F RVW MEDS BY RX/DR IN RCRD: CPT | Performed by: INTERNAL MEDICINE

## 2024-09-23 PROCEDURE — 3075F SYST BP GE 130 - 139MM HG: CPT | Performed by: INTERNAL MEDICINE

## 2024-09-23 PROCEDURE — 99214 OFFICE O/P EST MOD 30 MIN: CPT | Performed by: INTERNAL MEDICINE

## 2024-09-23 PROCEDURE — 3008F BODY MASS INDEX DOCD: CPT | Performed by: INTERNAL MEDICINE

## 2024-09-23 RX ORDER — OLMESARTAN MEDOXOMIL 40 MG/1
40 TABLET ORAL DAILY
Qty: 90 TABLET | Refills: 1 | Status: SHIPPED | OUTPATIENT
Start: 2024-09-23 | End: 2025-09-23

## 2024-09-23 RX ORDER — ATORVASTATIN CALCIUM 80 MG/1
80 TABLET, FILM COATED ORAL DAILY
Qty: 90 TABLET | Refills: 3 | Status: SHIPPED | OUTPATIENT
Start: 2024-09-23 | End: 2025-09-23

## 2024-09-23 ASSESSMENT — PAIN SCALES - GENERAL: PAINLEVEL: 0-NO PAIN

## 2024-09-23 ASSESSMENT — LIFESTYLE VARIABLES
HOW OFTEN DO YOU HAVE SIX OR MORE DRINKS ON ONE OCCASION: NEVER
SKIP TO QUESTIONS 9-10: 1
AUDIT TOTAL SCORE: 0
HAVE YOU OR SOMEONE ELSE BEEN INJURED AS A RESULT OF YOUR DRINKING: NO
HOW MANY STANDARD DRINKS CONTAINING ALCOHOL DO YOU HAVE ON A TYPICAL DAY: PATIENT DOES NOT DRINK
AUDIT-C TOTAL SCORE: 0
HAS A RELATIVE, FRIEND, DOCTOR, OR ANOTHER HEALTH PROFESSIONAL EXPRESSED CONCERN ABOUT YOUR DRINKING OR SUGGESTED YOU CUT DOWN: NO
HOW OFTEN DO YOU HAVE A DRINK CONTAINING ALCOHOL: NEVER

## 2024-09-23 ASSESSMENT — ENCOUNTER SYMPTOMS
LOSS OF SENSATION IN FEET: 0
OCCASIONAL FEELINGS OF UNSTEADINESS: 0
DEPRESSION: 0

## 2024-09-23 ASSESSMENT — PATIENT HEALTH QUESTIONNAIRE - PHQ9
2. FEELING DOWN, DEPRESSED OR HOPELESS: NOT AT ALL
SUM OF ALL RESPONSES TO PHQ9 QUESTIONS 1 AND 2: 0
1. LITTLE INTEREST OR PLEASURE IN DOING THINGS: NOT AT ALL

## 2024-09-23 NOTE — PROGRESS NOTES
Subjective   Chief Complaint   Patient presents with    Follow-up     Mrs\Ms. Fisher is present for her 6 month Follow up with Dr. Baker         68-year-old female patient with history of hypertension hyperlipidemia history of hypertension borderline.  Currently on olmesartan 40 mg tablet once a day.  So far stable cardiac rate does not a lot like to take her blood pressure medicine.  Like to reduce medication.  No active angina or CHF signs symptoms.  Patient had echocardiogram done in February of this year essentially showed underlying normal ejection fraction with mild TR seen.  Patient had a TSH was done recently bottom 3 days ago within normal range.  Lipid profile was done about 3 days ago showed total cholesterol 256, LDL is 178 elevated, triglycerides 125.  Comprehensive metabolic panel was unremarkable 3 days ago with LFT within normal range.  CBC also was within normal range.  Past Medical History:   Diagnosis Date    Chest pain 03/01/2023    Dizziness 03/01/2023    Elevated CPK 03/01/2023    Hyperlipidemia 03/01/2023    Hypertension 03/01/2023    Pain in right hip 12/02/2021    Hip pain, bilateral    Personal history of other diseases of the respiratory system     Personal history of asthma    Personal history of other endocrine, nutritional and metabolic disease     History of hyperlipidemia    Personal history of other specified conditions 03/30/2017    History of snoring    Personal history of urinary (tract) infections 01/18/2022    History of urinary tract infection    Pure hypercholesterolemia 03/01/2023    SOB (shortness of breath) on exertion 03/01/2023    Thyroid nodule 03/01/2023     Past Surgical History:   Procedure Laterality Date    CARPAL TUNNEL RELEASE Bilateral     TONSILLECTOMY       No relevant family history has been documented for this patient.  Current Outpatient Medications   Medication Sig Dispense Refill    albuterol 2.5 mg /3 mL (0.083 %) nebulizer solution Take 3 mL (2.5 mg) by  nebulization 4 times a day. USE 1 UNIT DOSE EVERY 4-6 HOURS AS NEEDED FOR WHEEZING . 2400 mL 0    albuterol 90 mcg/actuation inhaler Inhale 2 puffs every 4 hours if needed for wheezing or shortness of breath. 18 g 1    budesonide-formoteroL (Symbicort) 160-4.5 mcg/actuation inhaler Inhale 2 puffs 2 times a day. RINSE MOUTH AFTER USE. 10.2 g 1    fluticasone (Cutivate) 0.05 % cream Apply 1 Application topically 2 times a day. Apply and gentaly massage into affected area(s) twice daily 60 g 1    fluticasone (Flonase) 50 mcg/actuation nasal spray Administer 1 spray into each nostril once daily. 16 g 1    levocetirizine (Xyzal) 5 mg tablet Take 1 tablet (5 mg) by mouth once daily. 90 tablet 1    levothyroxine (Synthroid, Levoxyl) 125 mcg tablet Take 1 tablet (125 mcg) by mouth once daily. 90 tablet 1    nabumetone (Relafen) 750 mg tablet Take 1 tablet (750 mg) by mouth 2 times a day. 30 tablet 0    olmesartan (Benicar) 40 mg tablet Take 1 tablet (40 mg) by mouth once daily. 90 tablet 1    pantoprazole (ProtoNix) 40 mg EC tablet Take 1 tablet (40 mg) by mouth once daily. Do not crush, chew, or split. (Patient taking differently: Take 1 tablet (40 mg) by mouth if needed. Do not crush, chew, or split.) 90 tablet 0    atorvastatin (Lipitor) 80 mg tablet Take 1 tablet (80 mg) by mouth once daily. 90 tablet 3    cyclobenzaprine (Flexeril) 10 mg tablet Take 1 tablet (10 mg) by mouth as needed at bedtime for muscle spasms. 30 tablet 0     No current facility-administered medications for this visit.      reports that she has never smoked. She has never been exposed to tobacco smoke. She has never used smokeless tobacco. She reports that she does not drink alcohol and does not use drugs.  Allergies:  Sulfa (sulfonamide antibiotics)    ROS: See HPI  CONSTITUTIONAL: Chills- none. Fever- none. Weight change appropriate for age.  HEENT: Headache- Negative.  Change in vision- none.  Ear pain- none. Nasal congestion- none. Post-nasal  "drip-none.  Sore throat-none.  CARDIOLOGY: Chest pain- none.  Leg edema-trace.  Murmurs-soft systolic.  Palpitation- none.  RESPIRATORY: Denies any shortness of breath.  GI: Abdominal pain- none.  Change in bowel habits- none.  Constipation- none.  Diarrhea- none.  Nausea- none.  Vomiting- none.  MUSCULOSKELETAL: Joint pain- none.  Muscle aches- none.  DERMATOLOGY: Rash- none.  NEUROLOGY: Dizziness- none.   Headache- none.  PSYCHIATRY: Denies any depression or anxiety     Vitals:    09/23/24 1116   BP: 138/74   Pulse: 81   Resp: 16   Temp: 36.7 °C (98 °F)   TempSrc: Core   SpO2: 95%   Weight: 81.6 kg (180 lb)   Height: 1.562 m (5' 1.5\")   PainSc: 0-No pain      BMI:Body mass index is 33.46 kg/m².   General Cardiology:  General Appearance: Alert, oriented and in no acute distress.  HEENT: extra ocular movements intact (EOMI), pupils equal,  round, reactive to light and accommodation (PERRLA).  Carotid Upstroke: no bruit, normal.  Jugular Venous Distention (JVD): flat.  Chest: normal.  Lungs: Clear to auscultation,   Heart Sounds: no S3 or S4, normal S1, S2, regular rate.  Murmur, Click, Gallop: soft systolic murmur.  Abdomen: no hepatomegaly, no masses felt, soft.  Extremities: no leg edema.  Peripheral pulses: 2 plus bilateral.  NEUROLOGY Cranial nerves II-XII grossly intact.     Patient Active Problem List   Diagnosis    Allergic rhinitis    Asthma ([facility])    Atrophy of vagina    Back pain with radiation    Back pain    Cholelithiasis    Cough    COVID-19 virus infection    Dark brown-colored urine    Dermatitis, eczematoid    Disorder of bone and articular cartilage    Dizziness    Dry skin    Elevated CPK    Fatigue    Flu-like symptoms    Fungal rash of trunk    Hematuria    Abdominal pain    Chest pain    Hip pain, bilateral    Hyperglycemia    Hyperlipidemia    Hypertension    Hyponatremia    Hypothyroidism    Influenza    Low back pain    Myalgia    Neck pain    Oral thrush    Pain of left upper extremity "    Pelvic pain in female    Pure hypercholesterolemia    Snoring    SOB (shortness of breath) on exertion    Thyroid lump    Urine frequency    Uterine fibroid    UTI (urinary tract infection)    Candidiasis of vagina    Vitamin D deficiency    Sinusitis    Sinus congestion    Class 1 obesity with body mass index (BMI) of 30.0 to 30.9 in adult    Thyroid nodule    Pain in female pelvis    Acute bronchitis    Acute sinusitis    Stricture and stenosis of cervix uteri    Dyspareunia in female    COVID-19    Transaminasemia    Constipation    Fecal impaction (Multi)    Elevation of levels of liver transaminase levels    Daytime somnolence    Obstructive sleep apnea syndrome    Perennial allergic rhinitis with seasonal variation    Hypo-osmolality and hyponatremia       Problem List Items Addressed This Visit       Hyperlipidemia - Primary    Relevant Medications    atorvastatin (Lipitor) 80 mg tablet    Hypertension    Relevant Medications    atorvastatin (Lipitor) 80 mg tablet    Pure hypercholesterolemia    Relevant Medications    atorvastatin (Lipitor) 80 mg tablet    Class 1 obesity with body mass index (BMI) of 30.0 to 30.9 in adult    Relevant Medications    atorvastatin (Lipitor) 80 mg tablet    Obstructive sleep apnea syndrome    Relevant Medications    atorvastatin (Lipitor) 80 mg tablet      68-year-old female patient with history of hypertension hyperlipidemia.  So far stable at the moment.  Patient does have an elevated LDL and triglyceride.  1.  Hyper lipidemia: Will start patients on Lipitor 4 mg tablet once a day.  2.  Hypertension: Continue current olmesartan 4 mg tablet p.o. daily.  3.  Obesity: Weight loss advised.    Advised patient to avoid lunch meats, canned soups, pizzas, bread rolls, and sandwiches. Advised patient to limit salt intake 1,500 mg daily. Advised patient to exercise 30 mins/3 times a week including treadmill or aerobic type, Goal to achieve 65% target HR.    Diet and exercise  reviewed with patient..advice to walk about 10,000 steps or about 2 hours during day time. Cut back on salt, sugar and flour.    Pt. care time is spent includes review of diagnostic tests, labs, radiographs, EKGs, old echoes, cardiac work-up and coordination of care. Assessment, impression and plans are reflected in the note above as well as the orders.    Jaiden Baker MD

## 2024-10-06 ASSESSMENT — ENCOUNTER SYMPTOMS
FATIGUE: 0
CHEST TIGHTNESS: 0
DIFFICULTY URINATING: 0
COLOR CHANGE: 0
DYSURIA: 0
DIZZINESS: 0
JOINT SWELLING: 0
ADENOPATHY: 0
UNEXPECTED WEIGHT CHANGE: 0
WEAKNESS: 0
ACTIVITY CHANGE: 0
HEADACHES: 0
SHORTNESS OF BREATH: 0

## 2024-10-06 NOTE — PROGRESS NOTES
Annual-menopause  Subjective   Ade Fisher is a 69 y.o. last seen S 2022, who is here for a menopausal exam with concern of uterine fibroid/known to be present since 2022 (on pelvic ultrasound and confirmed at [facility] from 2022).  Complaints: recent ct scan for lower abdominal pain found colon diverticuli/COPIUS fecal material in distal colon as well as fluid/dilation of proximal colon. Also uterine fibroid still visible.   denies vag bleed or discharge; denies CURRENT pelvic  pain, pressure, or persistent bloating. Reports POOR bowel motility/constipation remains ongoing issue for pt; has been prescribed Miralax bid, but only uses sporadically.    PMHx: BMI 33       recurrent uti/AMH/pelvic pain; urogyn yesseniaal Lyla/S[place]co; prescribed PFPT       Hypothyroidism.     HTN.     DJD. High chol       Recurrent Sinusitis.  Asthma.       Submucosal uterine fibroid.  Surg Hx:  L CT Release ([facility])         Colonoscopy - benign 2020        D&C 2022,  partially submucosal fibroid seen/NOT resectable/see op note/photos  Father: , COLON CANCER; Mother:  65 yrs, LUNG CANCER  Positive for Hashimoto's, Ca Ov, Lung, OHD, HTN, CV  Last pap  2022-neg, 2014-neg,hpv-neg  Mamm: given order from PCP9/10/24, but not yet completed; 10/02/2020 NEG  Pelvic US 22: at [facility]: 6.6. x 3.8 x 5.5 cm uterus; 3cm fibroid RT wall/1.2cm fibroid LT wall; 2.2 cm polyp vs fibroid inside endometrial cavity;both ovaries seen/each 2cm.   Menarche 12.  Last Period MALACHI.   STDs none. not currently sexually active.   Total preg  0.  2 ADOPTED DAUGHTERS ; CARRIE AND LISA.   Review of Systems   Constitutional:  Negative for activity change, fatigue and unexpected weight change.   Respiratory:  Negative for chest tightness and shortness of breath.    Cardiovascular:  Negative for chest pain and leg swelling.   Gastrointestinal:  Positive for abdominal distention, abdominal pain and constipation.  "       Intermittent sxs   Genitourinary:  Negative for difficulty urinating, dysuria, genital sores, pelvic pain, vaginal bleeding, vaginal discharge and vaginal pain.   Musculoskeletal:  Positive for arthralgias, back pain and myalgias. Negative for gait problem and joint swelling.   Skin:  Negative for color change and rash.   Neurological:  Negative for dizziness, weakness and headaches.   Hematological:  Negative for adenopathy.   Objective Visit Vitals  /78   Ht 1.562 m (5' 1.5\")   Wt 80.4 kg (177 lb 3.2 oz)   BMI 32.94 kg/m²   OB Status Postmenopausal   Smoking Status Never   BSA 1.87 m²       General:   Alert and oriented, in no acute distress   Neck: Supple. No visible thyromegaly.    Breast/Axilla: Normal to palpation bilaterally without masses, skin changes, or nipple discharge.    Abdomen: Soft, non-tender, without masses or organomegaly   Vulva: Normal architecture without erythema, masses, or lesions.    Vagina:  mucosa without lesions, masses.  Positive atrophy. No abnormal vaginal discharge.    Cervix: Normal without masses, lesions, or signs of cervicitis.    Uterus: Grossly normal mobile, non-enlarged uterus ; exam limited by bmi   Adnexa: No palpable masses or tenderness   Pelvic Floor No POP noted. No high tone pelvic floor    Psych  Rectal Normal affect. Normal mood.      Assessment/Plan   Encounter Diagnoses   Name Primary?    Menopause; no suspicious findings on breast or pelvic exams. Yes    Encounter for screening mammogram for malignant neoplasm of breast; has order from PCP; agrees to schedule.     Intramural and submucous leiomyoma of uterus;  serial scans show stable lesions from 2021.     Postmenopausal atrophic vaginitis; reviewed treatment options.     Postmenopausal bone loss; due for DEXA; order placed     Screen for colon cancer; reports up-to-date and negative.    Procedures  Time Based Billing 2021:          Prep time:  ___10__ minutes.          Additional history:  __10___ " minutes.          Face to Face time w patient/family/caregiver:  ___20__ minutes.          Counseling/Coordination of care:  ___5__ minutes.          Ordering medication/testing/procedures:  2____ minutes.          External communications:  ___0__ minutes.          Documentation time:  ____5_ minutes.          Total time on date of encounter:             Total minutes:  35   Liudmila Dickerson MD

## 2024-10-08 ENCOUNTER — OFFICE VISIT (OUTPATIENT)
Dept: OBSTETRICS AND GYNECOLOGY | Facility: CLINIC | Age: 69
End: 2024-10-08
Payer: COMMERCIAL

## 2024-10-08 VITALS
WEIGHT: 177.2 LBS | BODY MASS INDEX: 32.61 KG/M2 | DIASTOLIC BLOOD PRESSURE: 78 MMHG | HEIGHT: 62 IN | SYSTOLIC BLOOD PRESSURE: 119 MMHG

## 2024-10-08 DIAGNOSIS — M81.0 POSTMENOPAUSAL BONE LOSS: ICD-10-CM

## 2024-10-08 DIAGNOSIS — Z12.11 SCREEN FOR COLON CANCER: ICD-10-CM

## 2024-10-08 DIAGNOSIS — Z12.31 ENCOUNTER FOR SCREENING MAMMOGRAM FOR MALIGNANT NEOPLASM OF BREAST: ICD-10-CM

## 2024-10-08 DIAGNOSIS — N95.2 POSTMENOPAUSAL ATROPHIC VAGINITIS: ICD-10-CM

## 2024-10-08 DIAGNOSIS — Z78.0 MENOPAUSE: Primary | ICD-10-CM

## 2024-10-08 DIAGNOSIS — D25.0 INTRAMURAL AND SUBMUCOUS LEIOMYOMA OF UTERUS: ICD-10-CM

## 2024-10-08 DIAGNOSIS — D25.1 INTRAMURAL AND SUBMUCOUS LEIOMYOMA OF UTERUS: ICD-10-CM

## 2024-10-08 PROCEDURE — 99214 OFFICE O/P EST MOD 30 MIN: CPT | Performed by: OBSTETRICS & GYNECOLOGY

## 2024-10-08 PROCEDURE — 1159F MED LIST DOCD IN RCRD: CPT | Performed by: OBSTETRICS & GYNECOLOGY

## 2024-10-08 PROCEDURE — 3078F DIAST BP <80 MM HG: CPT | Performed by: OBSTETRICS & GYNECOLOGY

## 2024-10-08 PROCEDURE — 1126F AMNT PAIN NOTED NONE PRSNT: CPT | Performed by: OBSTETRICS & GYNECOLOGY

## 2024-10-08 PROCEDURE — 1036F TOBACCO NON-USER: CPT | Performed by: OBSTETRICS & GYNECOLOGY

## 2024-10-08 PROCEDURE — 3008F BODY MASS INDEX DOCD: CPT | Performed by: OBSTETRICS & GYNECOLOGY

## 2024-10-08 PROCEDURE — 3074F SYST BP LT 130 MM HG: CPT | Performed by: OBSTETRICS & GYNECOLOGY

## 2024-10-08 PROCEDURE — 1160F RVW MEDS BY RX/DR IN RCRD: CPT | Performed by: OBSTETRICS & GYNECOLOGY

## 2024-10-08 ASSESSMENT — PAIN SCALES - GENERAL: PAINLEVEL: 0-NO PAIN

## 2024-10-08 ASSESSMENT — LIFESTYLE VARIABLES
HOW OFTEN DO YOU HAVE SIX OR MORE DRINKS ON ONE OCCASION: NEVER
HOW MANY STANDARD DRINKS CONTAINING ALCOHOL DO YOU HAVE ON A TYPICAL DAY: PATIENT DOES NOT DRINK
AUDIT-C TOTAL SCORE: 0
SKIP TO QUESTIONS 9-10: 1
HOW OFTEN DO YOU HAVE A DRINK CONTAINING ALCOHOL: NEVER

## 2024-10-08 ASSESSMENT — PATIENT HEALTH QUESTIONNAIRE - PHQ9
1. LITTLE INTEREST OR PLEASURE IN DOING THINGS: NOT AT ALL
SUM OF ALL RESPONSES TO PHQ9 QUESTIONS 1 & 2: 0
2. FEELING DOWN, DEPRESSED OR HOPELESS: NOT AT ALL

## 2024-10-08 ASSESSMENT — ENCOUNTER SYMPTOMS
BACK PAIN: 1
OCCASIONAL FEELINGS OF UNSTEADINESS: 0
ARTHRALGIAS: 1
DEPRESSION: 0
LOSS OF SENSATION IN FEET: 0
CONSTIPATION: 1
MYALGIAS: 1
ABDOMINAL DISTENTION: 1
ABDOMINAL PAIN: 1

## 2024-10-09 ENCOUNTER — APPOINTMENT (OUTPATIENT)
Dept: RADIOLOGY | Facility: HOSPITAL | Age: 69
End: 2024-10-09
Payer: COMMERCIAL

## 2024-10-21 ENCOUNTER — HOSPITAL ENCOUNTER (OUTPATIENT)
Dept: RADIOLOGY | Facility: HOSPITAL | Age: 69
Discharge: HOME | End: 2024-10-21
Payer: COMMERCIAL

## 2024-10-21 VITALS — HEIGHT: 61 IN | BODY MASS INDEX: 33.04 KG/M2 | WEIGHT: 175 LBS

## 2024-10-21 DIAGNOSIS — M81.0 POSTMENOPAUSAL BONE LOSS: ICD-10-CM

## 2024-10-21 DIAGNOSIS — Z12.31 ENCOUNTER FOR SCREENING MAMMOGRAM FOR BREAST CANCER: ICD-10-CM

## 2024-10-21 DIAGNOSIS — Z78.0 MENOPAUSE: ICD-10-CM

## 2024-10-21 PROCEDURE — 77067 SCR MAMMO BI INCL CAD: CPT

## 2024-10-21 PROCEDURE — 77067 SCR MAMMO BI INCL CAD: CPT | Performed by: RADIOLOGY

## 2024-10-21 PROCEDURE — 77063 BREAST TOMOSYNTHESIS BI: CPT | Performed by: RADIOLOGY

## 2024-10-21 PROCEDURE — 77080 DXA BONE DENSITY AXIAL: CPT | Performed by: RADIOLOGY

## 2024-10-21 PROCEDURE — 77080 DXA BONE DENSITY AXIAL: CPT

## 2024-11-25 ENCOUNTER — OFFICE VISIT (OUTPATIENT)
Dept: PRIMARY CARE | Facility: CLINIC | Age: 69
End: 2024-11-25
Payer: COMMERCIAL

## 2024-11-25 VITALS
SYSTOLIC BLOOD PRESSURE: 136 MMHG | DIASTOLIC BLOOD PRESSURE: 72 MMHG | WEIGHT: 181 LBS | HEIGHT: 61 IN | BODY MASS INDEX: 34.17 KG/M2

## 2024-11-25 DIAGNOSIS — R21 RASH: ICD-10-CM

## 2024-11-25 DIAGNOSIS — E78.2 MIXED HYPERLIPIDEMIA: ICD-10-CM

## 2024-11-25 DIAGNOSIS — J30.1 ALLERGIC RHINITIS DUE TO POLLEN, UNSPECIFIED SEASONALITY: ICD-10-CM

## 2024-11-25 DIAGNOSIS — I10 PRIMARY HYPERTENSION: ICD-10-CM

## 2024-11-25 DIAGNOSIS — R05.9 COUGH, UNSPECIFIED TYPE: ICD-10-CM

## 2024-11-25 DIAGNOSIS — K21.9 GASTROESOPHAGEAL REFLUX DISEASE WITHOUT ESOPHAGITIS: ICD-10-CM

## 2024-11-25 DIAGNOSIS — E03.8 OTHER SPECIFIED HYPOTHYROIDISM: ICD-10-CM

## 2024-11-25 PROCEDURE — 3078F DIAST BP <80 MM HG: CPT | Performed by: INTERNAL MEDICINE

## 2024-11-25 PROCEDURE — 3075F SYST BP GE 130 - 139MM HG: CPT | Performed by: INTERNAL MEDICINE

## 2024-11-25 PROCEDURE — 3008F BODY MASS INDEX DOCD: CPT | Performed by: INTERNAL MEDICINE

## 2024-11-25 PROCEDURE — 99214 OFFICE O/P EST MOD 30 MIN: CPT | Performed by: INTERNAL MEDICINE

## 2024-11-25 RX ORDER — LEVOCETIRIZINE DIHYDROCHLORIDE 5 MG/1
5 TABLET, FILM COATED ORAL DAILY
Qty: 90 TABLET | Refills: 1 | Status: SHIPPED | OUTPATIENT
Start: 2024-11-25

## 2024-11-25 RX ORDER — BUDESONIDE AND FORMOTEROL FUMARATE DIHYDRATE 160; 4.5 UG/1; UG/1
2 AEROSOL RESPIRATORY (INHALATION)
Qty: 10.2 G | Refills: 1 | Status: SHIPPED | OUTPATIENT
Start: 2024-11-25

## 2024-11-25 RX ORDER — ALBUTEROL SULFATE 0.83 MG/ML
2.5 SOLUTION RESPIRATORY (INHALATION) 4 TIMES DAILY
Qty: 2400 ML | Refills: 0 | Status: SHIPPED | OUTPATIENT
Start: 2024-11-25

## 2024-11-25 RX ORDER — PANTOPRAZOLE SODIUM 40 MG/1
40 TABLET, DELAYED RELEASE ORAL
Qty: 90 TABLET | Refills: 1 | Status: SHIPPED | OUTPATIENT
Start: 2024-11-25 | End: 2025-05-24

## 2024-11-25 RX ORDER — ALBUTEROL SULFATE 90 UG/1
2 INHALANT RESPIRATORY (INHALATION) EVERY 4 HOURS PRN
Qty: 18 G | Refills: 1 | Status: SHIPPED | OUTPATIENT
Start: 2024-11-25

## 2024-11-25 RX ORDER — FLUTICASONE PROPIONATE 50 MCG
1 SPRAY, SUSPENSION (ML) NASAL DAILY
Qty: 16 G | Refills: 1 | Status: SHIPPED | OUTPATIENT
Start: 2024-11-25

## 2024-11-25 RX ORDER — LEVOTHYROXINE SODIUM 125 UG/1
125 TABLET ORAL DAILY
Qty: 90 TABLET | Refills: 1 | Status: SHIPPED | OUTPATIENT
Start: 2024-11-25 | End: 2025-11-25

## 2024-11-26 ENCOUNTER — LAB (OUTPATIENT)
Dept: LAB | Facility: LAB | Age: 69
End: 2024-11-26
Payer: COMMERCIAL

## 2024-11-26 DIAGNOSIS — E03.8 OTHER SPECIFIED HYPOTHYROIDISM: ICD-10-CM

## 2024-11-26 DIAGNOSIS — I10 PRIMARY HYPERTENSION: ICD-10-CM

## 2024-11-26 DIAGNOSIS — E78.2 MIXED HYPERLIPIDEMIA: ICD-10-CM

## 2024-11-26 LAB
ALBUMIN SERPL BCP-MCNC: 4.5 G/DL (ref 3.4–5)
ALP SERPL-CCNC: 102 U/L (ref 33–136)
ALT SERPL W P-5'-P-CCNC: 20 U/L (ref 7–45)
ANION GAP SERPL CALC-SCNC: 11 MMOL/L (ref 10–20)
AST SERPL W P-5'-P-CCNC: 21 U/L (ref 9–39)
BILIRUB SERPL-MCNC: 0.8 MG/DL (ref 0–1.2)
BUN SERPL-MCNC: 13 MG/DL (ref 6–23)
CALCIUM SERPL-MCNC: 10.5 MG/DL (ref 8.6–10.6)
CHLORIDE SERPL-SCNC: 101 MMOL/L (ref 98–107)
CHOLEST SERPL-MCNC: 258 MG/DL (ref 0–199)
CHOLESTEROL/HDL RATIO: 5
CO2 SERPL-SCNC: 30 MMOL/L (ref 21–32)
CREAT SERPL-MCNC: 0.72 MG/DL (ref 0.5–1.05)
EGFRCR SERPLBLD CKD-EPI 2021: >90 ML/MIN/1.73M*2
ERYTHROCYTE [DISTWIDTH] IN BLOOD BY AUTOMATED COUNT: 13.1 % (ref 11.5–14.5)
GLUCOSE SERPL-MCNC: 99 MG/DL (ref 74–99)
HCT VFR BLD AUTO: 43.4 % (ref 36–46)
HDLC SERPL-MCNC: 51.6 MG/DL
HGB BLD-MCNC: 14.1 G/DL (ref 12–16)
LDLC SERPL CALC-MCNC: 166 MG/DL
MCH RBC QN AUTO: 28.7 PG (ref 26–34)
MCHC RBC AUTO-ENTMCNC: 32.5 G/DL (ref 32–36)
MCV RBC AUTO: 88 FL (ref 80–100)
NON HDL CHOLESTEROL: 206 MG/DL (ref 0–149)
NRBC BLD-RTO: 0 /100 WBCS (ref 0–0)
PLATELET # BLD AUTO: 336 X10*3/UL (ref 150–450)
POTASSIUM SERPL-SCNC: 4.4 MMOL/L (ref 3.5–5.3)
PROT SERPL-MCNC: 7.5 G/DL (ref 6.4–8.2)
RBC # BLD AUTO: 4.91 X10*6/UL (ref 4–5.2)
SODIUM SERPL-SCNC: 138 MMOL/L (ref 136–145)
TRIGL SERPL-MCNC: 204 MG/DL (ref 0–149)
TSH SERPL-ACNC: 0.26 MIU/L (ref 0.44–3.98)
VLDL: 41 MG/DL (ref 0–40)
WBC # BLD AUTO: 5.2 X10*3/UL (ref 4.4–11.3)

## 2024-11-26 PROCEDURE — 85027 COMPLETE CBC AUTOMATED: CPT

## 2024-11-26 PROCEDURE — 36415 COLL VENOUS BLD VENIPUNCTURE: CPT

## 2024-11-26 PROCEDURE — 80061 LIPID PANEL: CPT

## 2024-11-26 PROCEDURE — 84443 ASSAY THYROID STIM HORMONE: CPT

## 2024-11-26 PROCEDURE — 80053 COMPREHEN METABOLIC PANEL: CPT

## 2024-11-26 RX ORDER — DOXYCYCLINE 100 MG/1
100 CAPSULE ORAL 2 TIMES DAILY
Qty: 60 CAPSULE | Refills: 0 | Status: SHIPPED | OUTPATIENT
Start: 2024-11-26 | End: 2024-12-26

## 2024-11-26 RX ORDER — CLINDAMYCIN PHOSPHATE 11.9 MG/ML
SOLUTION TOPICAL 2 TIMES DAILY
Qty: 60 ML | Refills: 5 | Status: SHIPPED | OUTPATIENT
Start: 2024-11-26 | End: 2025-07-24

## 2024-11-26 RX ORDER — CLINDAMYCIN PHOSPHATE 20 MG/G
1 CREAM VAGINAL NIGHTLY
Qty: 40 G | Refills: 0 | Status: CANCELLED | OUTPATIENT
Start: 2024-11-26 | End: 2025-12-31

## 2024-12-03 NOTE — PROGRESS NOTES
Subjective   Patient ID: Ade Fisher is a 69 y.o. female who presents for Rash.    Rash       Patient is here for follow-up  Follow-up on hypertension, hypothyroidism, high cholesterol  Complaining of rash under the chin area and around the lips for the past 1 month  Due for blood work  Needs medication refill  Otherwise doing well     Past recap  patient is here for follow-up on hypertension hypothyroidism, high cholesterol  Complaining of neck pain.  She was painting a week ago now her neck hurts right upper arm hurts having difficulty sleeping    Patient is here with complaints of having sinus congestion.  She finished her antibiotic last week but she is getting sinus headaches again having postnasal drainage feeling headaches     patient is here for ER follow-up.  She has COVID and she was drinking a lot of fluid became very weak.  In the hospital she was found to have low sodium.  She was treated for COVID hyponatremia hypertension sinusitis.  She ended up in the ER again because she got constipated with abdominal cramping.  In the hospital given GI cocktail and her symptoms resolved.  She is feeling much better now      patient is here for follow-up hypertension high cholesterol hypothyroidism  Not taking statin  Follow-up on hypothyroidism  Did blood work  Over the weekend having fleeting chest pains a lot of stress not sleeping heart was beating weird        Follow-up on hypertension high cholesterol hypothyroidism  Did blood work  She has to go off the statins because it gave her severe muscle pain,  Wants refill on allergy medications  Needs refill on eczema cream  Her asthma is flaring up as she moved to new house and lot of work of remodeling going on refill of nebulizer solution  She feels her ears stuffy      Patient presents with complaints of urinary symptoms having some frequency dark urine and back pain over the weekend  She had colonoscopy done couple of months ago by Dr. Chris diagnosed with  "diverticular disease     She did not do sleep study     Follow-up on hypertension high cholesterol hypothyroidism  Needs medication refill  Did blood work  She feels very tired because she does not get enough sleep  She does snore but does not think she has sleep apnea did not do sleep study  She is off the statins could not tolerate the side effects  She was on vacation and was not watching her diet     Nonsmoker nondrinker  Family history mother  of lung cancer father  of colon cancer at 85 grandmother had stroke  Review of Systems   Skin:  Positive for rash.       Objective   /72   Ht 1.549 m (5' 1\")   Wt 82.1 kg (181 lb)   BMI 34.20 kg/m²     Physical Exam  Vitals reviewed.   Constitutional:       Appearance: Normal appearance.   HENT:      Head: Normocephalic and atraumatic.      Right Ear: Tympanic membrane, ear canal and external ear normal.      Left Ear: Tympanic membrane, ear canal and external ear normal.      Nose: Nose normal.      Mouth/Throat:      Pharynx: Oropharynx is clear.   Eyes:      Extraocular Movements: Extraocular movements intact.      Conjunctiva/sclera: Conjunctivae normal.      Pupils: Pupils are equal, round, and reactive to light.   Cardiovascular:      Rate and Rhythm: Normal rate and regular rhythm.      Pulses: Normal pulses.      Heart sounds: Normal heart sounds.   Pulmonary:      Effort: Pulmonary effort is normal.      Breath sounds: Normal breath sounds.   Abdominal:      General: Abdomen is flat. Bowel sounds are normal.      Palpations: Abdomen is soft.   Musculoskeletal:         General: Tenderness present.      Cervical back: Normal range of motion and neck supple.      Comments: Tenderness in neck muscles and upper Shoulder muscles   Skin:     General: Skin is warm and dry.      Findings: Rash present.   Neurological:      General: No focal deficit present.      Mental Status: She is alert and oriented to person, place, and time.   Psychiatric:         " Mood and Affect: Mood normal.         Assessment/Plan   Problem List Items Addressed This Visit             ICD-10-CM       Cardiac and Vasculature    Hyperlipidemia E78.5    Relevant Orders    CBC (Completed)    Comprehensive Metabolic Panel (Completed)    Lipid Panel (Completed)    Thyroid Stimulating Hormone (Completed)    CBC    Comprehensive Metabolic Panel    Thyroid Stimulating Hormone    Lipid Panel    Hypertension I10    Relevant Orders    CBC (Completed)    Comprehensive Metabolic Panel (Completed)    Lipid Panel (Completed)    Thyroid Stimulating Hormone (Completed)    CBC    Comprehensive Metabolic Panel    Thyroid Stimulating Hormone    Lipid Panel       ENT    Allergic rhinitis J30.9    Relevant Medications    levocetirizine (Xyzal) 5 mg tablet    fluticasone (Flonase) 50 mcg/actuation nasal spray       Endocrine/Metabolic    Hypothyroidism E03.9    Relevant Medications    levothyroxine (Synthroid, Levoxyl) 125 mcg tablet    Other Relevant Orders    CBC (Completed)    Comprehensive Metabolic Panel (Completed)    Lipid Panel (Completed)    Thyroid Stimulating Hormone (Completed)    CBC    Comprehensive Metabolic Panel    Thyroid Stimulating Hormone    Lipid Panel       Pulmonary and Pneumonias    Cough R05.9    Relevant Medications    budesonide-formoteroL (Symbicort) 160-4.5 mcg/actuation inhaler    albuterol 90 mcg/actuation inhaler    albuterol 2.5 mg /3 mL (0.083 %) nebulizer solution     Other Visit Diagnoses         Codes    Gastroesophageal reflux disease without esophagitis     K21.9    Relevant Medications    pantoprazole (ProtoNix) 40 mg EC tablet    Rash     R21    Relevant Medications    doxycycline (Vibramycin) 100 mg capsule    clindamycin (Cleocin T) 1 % external solution          Past recap  Blood pressure is stable  Will decrease levothyroxine 100 mcg  Ultrasound thyroid for the thyroid nodule yearly follow-up  Cholesterol is extremely high but patient does not want to take  medications  She understands risks  She is cannot do it with diet and exercise  For hematuria she saw the urologist cystoscopy normal  Patient thinks that years she was going through a lot of stress and anxiety could have been contributing to a lot of her symptoms  Follow-up blood work in 3 months     3/6/23  Patient tympanic membrane clear  She has some tenderness in the TMJ  Flonase nasal spray to help with the eustachian tube dysfunction  Blood work reviewed  TSH little elevated I think patient was missing few dosages which she accepted  We will continue levothyroxine 100 mcg  Patient is again refusing to take statins for high cholesterol  We will do CT cardiac scoring to assess the risk  Follow-up blood work in 6 months        6/23/2023  I am currently not convinced if patient has sinus infection  I think patient is having rhinitis and allergies  Advised to use Flonase twice a day  Use Claritin-D  If not better then only try antibiotic  Also offered patient to do CAT scan of the sinuses but she wants to wait     8/15/2023     Patient has mild eustachian tube dysfunction on the right side  Sinuses passages clean  I am wondering if patient has acid reflux causing chest congestion  Treat with Protonix Z-Jez and Medrol Dosepak  We will do CT of the sinuses for recurrent sinusitis  Keep her follow-up with the ENT     11/13/2023  Blood work reviewed  TSH elevated at 8.64  Increase levothyroxine 125 mcg  Cholesterol is high   Patient is intolerant to statins and does not want to take it  She will do with diet and exercise  Cholesterol diet chart given  LFT still elevated  Again emphasized low-carb diet  Follow-up blood work in 3 months     1/11/2024  EKG done shows normal sinus rhythm  Troponin ordered  Blood work reviewed all in normal range  Continue current medications  Advised to go to the emergency room if chest pain happens again  Stress test done in 2019 was normal  Patient has not done CT cardiac scoring  advised to do  Discussed diet exercise and lifestyle modification to    2/13/2024  Hospital records reviewed ER records reviewed  Advised patient to cut down nonessential liquids and diet.  Cut down soups  Cut down soft drinks  BMP q. weekly for 4 times  Routine follow-up    2/26/2024  Clinically patient does not look like she has any sinus congestion  Will get x-ray sinuses to see if she needs antibiotics or if that headache giving her symptoms  Augmentin pending the x-ray results  Addendum x-ray of sinuses does not show any infection  Advised patient not to take medication  Treat headache with anti-inflammatory    6/18/2024  Neck pain most likely from neck spasm  Treat with Relafen and Flexeril  Heat stretching exercises ice  Blood pressure stable  Blood work ordered  Medications refilled    11/25/2024  Patient has perioral dermatitis  Pathophysiology discussed  Doxycycline 100 mg twice a day  Clindamycin cream topically  Blood work ordered  Blood pressure stable

## 2024-12-13 ENCOUNTER — OFFICE VISIT (OUTPATIENT)
Dept: URGENT CARE | Age: 69
End: 2024-12-13
Payer: COMMERCIAL

## 2024-12-13 VITALS
SYSTOLIC BLOOD PRESSURE: 106 MMHG | TEMPERATURE: 98.1 F | OXYGEN SATURATION: 95 % | RESPIRATION RATE: 20 BRPM | BODY MASS INDEX: 33.99 KG/M2 | HEIGHT: 61 IN | DIASTOLIC BLOOD PRESSURE: 74 MMHG | HEART RATE: 79 BPM | WEIGHT: 180 LBS

## 2024-12-13 DIAGNOSIS — J32.9 BACTERIAL SINUSITIS: Primary | ICD-10-CM

## 2024-12-13 DIAGNOSIS — B96.89 BACTERIAL SINUSITIS: Primary | ICD-10-CM

## 2024-12-13 RX ORDER — CEFDINIR 300 MG/1
300 CAPSULE ORAL 2 TIMES DAILY
Qty: 20 CAPSULE | Refills: 0 | Status: SHIPPED | OUTPATIENT
Start: 2024-12-13 | End: 2024-12-23

## 2024-12-13 ASSESSMENT — PAIN SCALES - GENERAL: PAINLEVEL_OUTOF10: 6

## 2024-12-13 NOTE — PROGRESS NOTES
Chief Complaint   Patient presents with    Sinusitis     Sinus problems x 2 w. Postnasal drainage. Sinus pressure. Popping painful ears. Saline and Flonase used to treat sx.     Headache     Intermittent.       Physical Exam:     GEN: No acute distress    ENT: Bilateral TMs bulging with serous effusions, canals unremarkable, sinus congestion present. Frontal and maxillary sinus tender to finger tap. Pharynx and tonsils mildly hyperemic but without exudate.     Resp: Lungs clear to auscultation bilaterally           Encounter Diagnosis   Name Primary?    Bacterial sinusitis Yes        Medical Decision Making & Plan:     Rx Cefdinir         12/13/24 at 10:04 AM - Aura Domínguez DO

## 2024-12-18 ENCOUNTER — TELEPHONE (OUTPATIENT)
Dept: URGENT CARE | Age: 69
End: 2024-12-18

## 2024-12-18 DIAGNOSIS — B96.89 BACTERIAL SINUSITIS: Primary | ICD-10-CM

## 2024-12-18 DIAGNOSIS — J32.9 BACTERIAL SINUSITIS: Primary | ICD-10-CM

## 2024-12-18 RX ORDER — AMOXICILLIN AND CLAVULANATE POTASSIUM 875; 125 MG/1; MG/1
1 TABLET, FILM COATED ORAL 2 TIMES DAILY
Qty: 20 TABLET | Refills: 0 | Status: SHIPPED | OUTPATIENT
Start: 2024-12-18 | End: 2024-12-28

## 2024-12-18 NOTE — TELEPHONE ENCOUNTER
I spoke with patient and she confirmed her last name and date of birth for me.  She states she was getting mild nausea with cefdinir that was given to her for sinus infection on 12/13/2024.  States last dose was yesterday morning and states the nausea is improving.  Denies any other reactions with this medication including facial/tongue/lip/throat swelling, hives, rash, voice hoarseness, abdominal pain, vomiting or diarrhea, fevers or chills.  States she has no other new symptoms.  States she still is having some sinus pressure.  States she is actually not taking doxycycline due to it drying out her skin.  Patient states she has taken Augmentin in the past without any complications.  Instructed patient to discontinue cefdinir.  Prescribed Augmentin.  Instructed to continue using Flonase daily.  Maintain adequate hydration nutrition.  Instructed to follow-up with PCP in 1 to 2 weeks.  Return/ER precautions.  Patient agrees with plan.

## 2025-01-29 ENCOUNTER — TELEMEDICINE (OUTPATIENT)
Dept: PRIMARY CARE | Facility: CLINIC | Age: 70
End: 2025-01-29
Payer: COMMERCIAL

## 2025-01-29 DIAGNOSIS — J32.9 SINUSITIS, UNSPECIFIED CHRONICITY, UNSPECIFIED LOCATION: ICD-10-CM

## 2025-01-29 DIAGNOSIS — R79.89 LOW SERUM SODIUM: ICD-10-CM

## 2025-01-29 DIAGNOSIS — J41.0 SIMPLE CHRONIC BRONCHITIS (MULTI): ICD-10-CM

## 2025-01-29 DIAGNOSIS — E22.2 SYNDROME OF INAPPROPRIATE SECRETION OF ANTIDIURETIC HORMONE (MULTI): ICD-10-CM

## 2025-01-29 DIAGNOSIS — J11.1 FLU: ICD-10-CM

## 2025-01-29 DIAGNOSIS — R09.82 POSTNASAL DRIP: ICD-10-CM

## 2025-01-29 DIAGNOSIS — R05.8 OTHER COUGH: Primary | ICD-10-CM

## 2025-01-29 DIAGNOSIS — E78.2 MIXED HYPERLIPIDEMIA: ICD-10-CM

## 2025-01-29 PROCEDURE — 99213 OFFICE O/P EST LOW 20 MIN: CPT | Performed by: INTERNAL MEDICINE

## 2025-01-29 RX ORDER — OSELTAMIVIR PHOSPHATE 75 MG/1
75 CAPSULE ORAL 2 TIMES DAILY
Qty: 10 CAPSULE | Refills: 0 | Status: SHIPPED | OUTPATIENT
Start: 2025-01-29 | End: 2025-02-03

## 2025-01-29 RX ORDER — AMOXICILLIN AND CLAVULANATE POTASSIUM 875; 125 MG/1; MG/1
875 TABLET, FILM COATED ORAL 2 TIMES DAILY
Qty: 20 TABLET | Refills: 0 | Status: SHIPPED | OUTPATIENT
Start: 2025-01-29 | End: 2025-02-08

## 2025-01-30 LAB
ALBUMIN SERPL-MCNC: 4.4 G/DL (ref 3.6–5.1)
ALP SERPL-CCNC: 79 U/L (ref 37–153)
ALT SERPL-CCNC: 17 U/L (ref 6–29)
ANION GAP SERPL CALCULATED.4IONS-SCNC: 13 MMOL/L (CALC) (ref 7–17)
AST SERPL-CCNC: 30 U/L (ref 10–35)
BILIRUB SERPL-MCNC: 0.6 MG/DL (ref 0.2–1.2)
BUN SERPL-MCNC: 18 MG/DL (ref 7–25)
CALCIUM SERPL-MCNC: 9.6 MG/DL (ref 8.6–10.4)
CHLORIDE SERPL-SCNC: 101 MMOL/L (ref 98–110)
CHOLEST SERPL-MCNC: 232 MG/DL
CHOLEST/HDLC SERPL: 4.7 (CALC)
CO2 SERPL-SCNC: 24 MMOL/L (ref 20–32)
CREAT SERPL-MCNC: 0.69 MG/DL (ref 0.5–1.05)
EGFRCR SERPLBLD CKD-EPI 2021: 94 ML/MIN/1.73M2
ERYTHROCYTE [DISTWIDTH] IN BLOOD BY AUTOMATED COUNT: 12.4 % (ref 11–15)
GLUCOSE SERPL-MCNC: 101 MG/DL (ref 65–99)
HCT VFR BLD AUTO: 43.2 % (ref 35–45)
HDLC SERPL-MCNC: 49 MG/DL
HGB BLD-MCNC: 13.9 G/DL (ref 11.7–15.5)
LDLC SERPL CALC-MCNC: 157 MG/DL (CALC)
MCH RBC QN AUTO: 28.3 PG (ref 27–33)
MCHC RBC AUTO-ENTMCNC: 32.2 G/DL (ref 32–36)
MCV RBC AUTO: 88 FL (ref 80–100)
NONHDLC SERPL-MCNC: 183 MG/DL (CALC)
PLATELET # BLD AUTO: 359 THOUSAND/UL (ref 140–400)
PMV BLD REES-ECKER: 9.3 FL (ref 7.5–12.5)
POTASSIUM SERPL-SCNC: 3.9 MMOL/L (ref 3.5–5.3)
PROT SERPL-MCNC: 7.2 G/DL (ref 6.1–8.1)
RBC # BLD AUTO: 4.91 MILLION/UL (ref 3.8–5.1)
SODIUM SERPL-SCNC: 138 MMOL/L (ref 135–146)
TRIGL SERPL-MCNC: 140 MG/DL
TSH SERPL-ACNC: 0.36 MIU/L (ref 0.4–4.5)
WBC # BLD AUTO: 3.6 THOUSAND/UL (ref 3.8–10.8)

## 2025-01-30 NOTE — PROGRESS NOTES
Subjective   Patient ID: Ade Fisher is a 69 y.o. female who presents for Follow-up.    Ade Fisher today came here for multiple medical issues.  1. Fever, chills, not feeling good, headache since yesterday.  She got flu, she is throwing up.  Other family members have same.  2. She also has sinus congestion, postnasal drip going on for last few days.  She is concerned with that.  She came for follow-up on various conditions.    I have personally reviewed the patient's Past Medical History, Medications, Allergies, Social History, and Family History in the EMR.    Review of Systems   All other systems reviewed and are negative.  Feeling very weak and lethargic.    Objective   Virtual. There were no vitals taken for this visit.    Physical Exam  HENT:      Nose:      Comments: The patient has sinus congestion, postnasal drip, looks under weather.    LAB WORK: Laboratory testing discussed.    Assessment/Plan   Problem List Items Addressed This Visit             ICD-10-CM       Cardiac and Vasculature    Hyperlipidemia E78.5    Relevant Orders    Basic Metabolic Panel       ENT    Sinusitis J32.9       Pulmonary and Pneumonias    Cough - Primary R05.9    Relevant Medications    amoxicillin-pot clavulanate (Augmentin) 875-125 mg tablet     Other Visit Diagnoses         Codes    Flu     J11.1    Relevant Medications    oseltamivir (Tamiflu) 75 mg capsule    Postnasal drip     R09.82    Low serum sodium     R79.89        1. Flu, clinically sounds like.  Tamiflu 75 mg twice a day.  2. Oral hydration ______.  3. Sinusitis, postnasal drip, congestion.  She told me she has taken amoxicillin before, it helped. Amoxicillin given.  4. Follow-up in two weeks if not better.  5. History of low sodium.  Sodium check ordered.  6. ______.  Monitor.    Scribe Attestation  By signing my name below, IMery Scribe attest that this documentation has been prepared under the direction and in the presence of Catalina Ellis MD.        All medical record entries made by the scribe were personally dictated by me I have reviewed the chart and agree the record accurately reflects my personal performance of his history physical examination and management

## 2025-01-31 PROBLEM — E22.2 SYNDROME OF INAPPROPRIATE SECRETION OF ANTIDIURETIC HORMONE (MULTI): Status: ACTIVE | Noted: 2025-01-31

## 2025-01-31 PROBLEM — J41.0 SIMPLE CHRONIC BRONCHITIS (MULTI): Status: ACTIVE | Noted: 2025-01-31

## 2025-03-05 ENCOUNTER — OFFICE VISIT (OUTPATIENT)
Dept: PRIMARY CARE | Facility: CLINIC | Age: 70
End: 2025-03-05
Payer: COMMERCIAL

## 2025-03-05 ENCOUNTER — HOSPITAL ENCOUNTER (OUTPATIENT)
Dept: RADIOLOGY | Facility: CLINIC | Age: 70
Discharge: HOME | End: 2025-03-05
Payer: COMMERCIAL

## 2025-03-05 VITALS
DIASTOLIC BLOOD PRESSURE: 70 MMHG | BODY MASS INDEX: 32.47 KG/M2 | SYSTOLIC BLOOD PRESSURE: 122 MMHG | WEIGHT: 172 LBS | HEIGHT: 61 IN

## 2025-03-05 DIAGNOSIS — R09.89 CHEST CONGESTION: ICD-10-CM

## 2025-03-05 DIAGNOSIS — J30.1 ALLERGIC RHINITIS DUE TO POLLEN, UNSPECIFIED SEASONALITY: ICD-10-CM

## 2025-03-05 DIAGNOSIS — J01.90 ACUTE SINUSITIS, RECURRENCE NOT SPECIFIED, UNSPECIFIED LOCATION: Primary | ICD-10-CM

## 2025-03-05 DIAGNOSIS — R09.82 POSTNASAL DRIP: ICD-10-CM

## 2025-03-05 PROCEDURE — 3078F DIAST BP <80 MM HG: CPT | Performed by: INTERNAL MEDICINE

## 2025-03-05 PROCEDURE — 3074F SYST BP LT 130 MM HG: CPT | Performed by: INTERNAL MEDICINE

## 2025-03-05 PROCEDURE — 99214 OFFICE O/P EST MOD 30 MIN: CPT | Performed by: INTERNAL MEDICINE

## 2025-03-05 PROCEDURE — 3008F BODY MASS INDEX DOCD: CPT | Performed by: INTERNAL MEDICINE

## 2025-03-05 PROCEDURE — 71046 X-RAY EXAM CHEST 2 VIEWS: CPT

## 2025-03-05 PROCEDURE — 1159F MED LIST DOCD IN RCRD: CPT | Performed by: INTERNAL MEDICINE

## 2025-03-05 RX ORDER — LEVOFLOXACIN 500 MG/1
500 TABLET, FILM COATED ORAL DAILY
Qty: 10 TABLET | Refills: 0 | Status: SHIPPED | OUTPATIENT
Start: 2025-03-05 | End: 2025-03-15

## 2025-03-05 RX ORDER — GUAIFENESIN 100 MG/5ML
200 SOLUTION ORAL 3 TIMES DAILY PRN
Qty: 120 ML | Refills: 0 | Status: SHIPPED | OUTPATIENT
Start: 2025-03-05 | End: 2025-03-15

## 2025-03-05 RX ORDER — FLUTICASONE PROPIONATE 50 MCG
1 SPRAY, SUSPENSION (ML) NASAL DAILY
Qty: 16 G | Refills: 1 | Status: SHIPPED | OUTPATIENT
Start: 2025-03-05

## 2025-03-05 RX ORDER — FLUTICASONE FUROATE 27.5 UG/1
1 SPRAY, METERED NASAL
Qty: 10 G | Refills: 2 | Status: SHIPPED | OUTPATIENT
Start: 2025-03-05 | End: 2026-03-05

## 2025-03-05 RX ORDER — LORATADINE 10 MG/1
10 TABLET ORAL DAILY
Qty: 30 TABLET | Refills: 2 | Status: SHIPPED | OUTPATIENT
Start: 2025-03-05 | End: 2025-06-03

## 2025-03-05 RX ORDER — BENZONATATE 100 MG/1
100 CAPSULE ORAL 3 TIMES DAILY PRN
Qty: 42 CAPSULE | Refills: 0 | Status: SHIPPED | OUTPATIENT
Start: 2025-03-05 | End: 2025-04-04

## 2025-03-05 ASSESSMENT — ENCOUNTER SYMPTOMS
LOSS OF SENSATION IN FEET: 0
OCCASIONAL FEELINGS OF UNSTEADINESS: 0
DEPRESSION: 0

## 2025-03-05 NOTE — PROGRESS NOTES
"Subjective   Patient ID: Ade Fisher is a 69 y.o. female who presents for Illness.    Ms. Ade Fisher today came here for sinus congestion, postnasal drip, congestion, both ears are hurting.  She just finished course of antibiotics.  Not feeling well, cough, congestion, shortness of breath.  She thinks it is a recurrent infection.  She came for follow-up.  Normally she sees Dr. Jennifer Ellis.    I have personally reviewed the patient's Past Medical History, Medications, Allergies, Social History, and Family History in the EMR.    Review of Systems   All other systems reviewed and are negative.    Objective   /70   Ht 1.549 m (5' 1\")   Wt 78 kg (172 lb)   BMI 32.50 kg/m²     Physical Exam  Vitals reviewed.   HENT:      Right Ear: Tympanic membrane is retracted.      Left Ear: Tympanic membrane is retracted.      Nose: Congestion present.      Comments: Postnasal drip. Sinus tenderness.     Mouth/Throat:      Comments: Throat congested.  Cardiovascular:      Heart sounds: Normal heart sounds, S1 normal and S2 normal. No murmur heard.     No friction rub.   Pulmonary:      Effort: Pulmonary effort is normal.      Breath sounds: Normal breath sounds and air entry.   Abdominal:      Palpations: There is no hepatomegaly, splenomegaly or mass.   Musculoskeletal:      Right lower leg: No edema.      Left lower leg: No edema.   Lymphadenopathy:      Lower Body: No right inguinal adenopathy. No left inguinal adenopathy.   Neurological:      Cranial Nerves: Cranial nerves 2-12 are intact.      Sensory: No sensory deficit.      Motor: Motor function is intact.      Deep Tendon Reflexes: Reflexes are normal and symmetric.     LAB WORK: Laboratory testing discussed.    Assessment/Plan   Problem List Items Addressed This Visit             ICD-10-CM    Allergic rhinitis J30.9    Relevant Medications    fluticasone (Flonase) 50 mcg/actuation nasal spray    levoFLOXacin (Levaquin) 500 mg tablet    loratadine (Claritin) 10 mg " tablet    guaiFENesin (Robitussin) 100 mg/5 mL syrup    fluticasone (Flonase Sensimist) 27.5 mcg/actuation nasal spray    benzonatate (Tessalon) 100 mg capsule    Other Relevant Orders    CT sinus wo IV contrast    Acute sinusitis - Primary J01.90     Other Visit Diagnoses         Codes    Chest congestion     R09.89    Relevant Medications    levoFLOXacin (Levaquin) 500 mg tablet    loratadine (Claritin) 10 mg tablet    guaiFENesin (Robitussin) 100 mg/5 mL syrup    fluticasone (Flonase Sensimist) 27.5 mcg/actuation nasal spray    benzonatate (Tessalon) 100 mg capsule    Other Relevant Orders    XR chest 2 views    Postnasal drip     R09.82        1. Acute sinusitis, postnasal drip, congestion, recurrent problem.  CT sinuses, chest x-ray.  Levaquin, Claritin, Robitussin, Flonase, Tessalon Perles, albuterol.  2. Follow-up in a week after CT scan.  She will see Dr. Jennifer Ellis.  3. Continue to follow.    Sushma Attestation  By signing my name below, I, Sushma Grant attest that this documentation has been prepared under the direction and in the presence of Catalina Ellis MD.     All medical record entries made by the sushma were personally dictated by me I have reviewed the chart and agree the record accurately reflects my personal performance of his history physical examination and management

## 2025-03-11 ENCOUNTER — APPOINTMENT (OUTPATIENT)
Dept: OBSTETRICS AND GYNECOLOGY | Facility: CLINIC | Age: 70
End: 2025-03-11
Payer: COMMERCIAL

## 2025-03-19 ENCOUNTER — APPOINTMENT (OUTPATIENT)
Dept: RADIOLOGY | Facility: CLINIC | Age: 70
End: 2025-03-19
Payer: COMMERCIAL

## 2025-03-20 ENCOUNTER — HOSPITAL ENCOUNTER (OUTPATIENT)
Dept: RADIOLOGY | Facility: CLINIC | Age: 70
Discharge: HOME | End: 2025-03-20
Payer: COMMERCIAL

## 2025-03-20 DIAGNOSIS — J30.1 ALLERGIC RHINITIS DUE TO POLLEN, UNSPECIFIED SEASONALITY: ICD-10-CM

## 2025-03-20 PROCEDURE — 70486 CT MAXILLOFACIAL W/O DYE: CPT

## 2025-04-22 ENCOUNTER — APPOINTMENT (OUTPATIENT)
Dept: OBSTETRICS AND GYNECOLOGY | Facility: CLINIC | Age: 70
End: 2025-04-22
Payer: COMMERCIAL

## 2025-04-22 VITALS — WEIGHT: 174 LBS | BODY MASS INDEX: 32.88 KG/M2 | DIASTOLIC BLOOD PRESSURE: 60 MMHG | SYSTOLIC BLOOD PRESSURE: 110 MMHG

## 2025-04-22 DIAGNOSIS — N39.46 MIXED STRESS AND URGE INCONTINENCE: Primary | ICD-10-CM

## 2025-04-22 DIAGNOSIS — R31.21 ASYMPTOMATIC MICROSCOPIC HEMATURIA: ICD-10-CM

## 2025-04-22 DIAGNOSIS — M79.18 MYOFASCIAL PAIN: ICD-10-CM

## 2025-04-22 LAB
POC APPEARANCE, URINE: CLEAR
POC BILIRUBIN, URINE: NEGATIVE
POC BLOOD, URINE: NEGATIVE
POC COLOR, URINE: YELLOW
POC GLUCOSE, URINE: NEGATIVE MG/DL
POC KETONES, URINE: NEGATIVE MG/DL
POC LEUKOCYTES, URINE: NEGATIVE
POC NITRITE,URINE: NEGATIVE
POC PH, URINE: 7 PH
POC PROTEIN, URINE: NEGATIVE MG/DL
POC SPECIFIC GRAVITY, URINE: 1.01
POC UROBILINOGEN, URINE: 0.2 EU/DL

## 2025-04-22 PROCEDURE — 1126F AMNT PAIN NOTED NONE PRSNT: CPT | Performed by: OBSTETRICS & GYNECOLOGY

## 2025-04-22 PROCEDURE — 1159F MED LIST DOCD IN RCRD: CPT | Performed by: OBSTETRICS & GYNECOLOGY

## 2025-04-22 PROCEDURE — 3078F DIAST BP <80 MM HG: CPT | Performed by: OBSTETRICS & GYNECOLOGY

## 2025-04-22 PROCEDURE — 3074F SYST BP LT 130 MM HG: CPT | Performed by: OBSTETRICS & GYNECOLOGY

## 2025-04-22 PROCEDURE — 81003 URINALYSIS AUTO W/O SCOPE: CPT | Performed by: OBSTETRICS & GYNECOLOGY

## 2025-04-22 PROCEDURE — 99214 OFFICE O/P EST MOD 30 MIN: CPT | Performed by: OBSTETRICS & GYNECOLOGY

## 2025-04-22 ASSESSMENT — ENCOUNTER SYMPTOMS
GASTROINTESTINAL NEGATIVE: 1
ENDOCRINE NEGATIVE: 1
EYES NEGATIVE: 1
RESPIRATORY NEGATIVE: 1
CARDIOVASCULAR NEGATIVE: 1
NEUROLOGICAL NEGATIVE: 1
PSYCHIATRIC NEGATIVE: 1
CONSTITUTIONAL NEGATIVE: 1
MUSCULOSKELETAL NEGATIVE: 1

## 2025-04-22 ASSESSMENT — PAIN SCALES - GENERAL: PAINLEVEL_OUTOF10: 0-NO PAIN

## 2025-04-22 NOTE — PROGRESS NOTES
[facility] Department of Urogynecology   Yolie Garcia MD, MPH   [phone]    ASSESSMENT AND PLAN:   69 year old female with AMH, MPP, and CHRIS with stress > urge. Comorbidities include: HTN and hypothyroidism.      Diagnoses:  #1 Asymptomatic microscopic hematuria  #2 Recurrent UTI  #3 Myofascial pelvic pain  #4 Mixed urinary incontinence, urge > stress     Plan:  1. AMH, hx of Crow  - No recent UTI and no recent positive urine cultures within the past x2 years.   - POCT UA negative and will send urine for UA microscopy.   - Hx of normal cystoscopy on 9/15/2022 and unremarkable kidney U/S with no obstruction at that time to complete AMH work up; will need repeat AMH work up in 2027 if she is still having blood on UA.   - Plan to continue yearly urinalysis for surveillance of AMH. Negative urine dip today     2. MPP  - We encouraged her to restart her HEP from PFPT more regularly around 2-3x per week as this has adequately reduced her MPP in the past.   > If PFPT does not optimize her MPP relief or if PFPT loses efficacy over time we may consider other treatment options in the future such as PF Botox injections, pelvic floor TPI with pudendal nerve blocks, etc.     3. UUI, OAB  - We discussed OAB lifestyle changes (i.e., trying to limit fluids to 60oz in total per day, timed voiding every 2-3 hours, stop drinking fluids 3 hours prior to bedtime, and avoiding/limiting bladder irritants such as caffeine, nicotine, artificial sweeteners, acidic/spicy foods, and alcohol).  - Discussed OAB treatment options such as lifestyle changes, PFPT, medications, PTNS, intradetrusor Botox injections, or SNM.   - We discussed that PFPT may help with bladder/pelvic floor restrengthening exercises with an overall goal to better control the bladder and improve urinary symptoms. PFPT includes attending sessions with a specialized licensed female pelvic floor physical therapist who does external with internal vaginal work to  ensure she is doing the correct exercises to obtain the most benefit of physical therapy. We reviewed the importance of continuing these home exercises to receive maximum benefit from PFPT. They also teach mind over bladder strategies/urge suppression techniques to reduce the intensity of the urge to void.   - We discussed that with starting an OAB medication the goal would be to allow the detrusor muscle around the bladder to relax and prevent the muscles from spasm/squeezing too frequently to allow the bladder to store more urine which reduces the urge, frequency, and incontinent episodes.   > She is interested in trying PFPT to help manage her bladder control and OAB lifestyle changes.   - PFPT requisition sent today and gave her the list of local physical therapists with their corresponding locations/contact information.    4. GREYSON  - Plan to focus on managing UUI as it is more bothersome than GREYSON but understands that PFPT can address both types of CHRIS.     Follow up in 3-4 months with Dr. Yolie Garcia after completing some PPFT to manage CHRIS with U > S.     Scribe Attestation  By signing my name below, I, Cruz Garcia, Scribe, attest that this documentation has been prepared under the direction and in the presence of Yolie Garcia MD MPH on 04/22/2025 at 1:26 PM.     Problem List Items Addressed This Visit          Genitourinary and Reproductive    Hematuria     Other Visit Diagnoses         Mixed stress and urge incontinence    -  Primary    Relevant Orders    Referral to Physical Therapy    POCT UA Automated manually resulted (Completed)      Myofascial pain               I spent a total of 30 minutes in face to face and non face to face time.   I Dr. Garcia, personally performed the services described in the documentation as scribed in my presence and confirm it is both complete and accurate.  Yolie Garcia MD, MPH, FACOG       Yolie Garcia MD, MPH, FACOG     Established    HISTORY OF PRESENT  ILLNESS:   69 year old female with hx of AMH who has an annual UA to monitor, MPP, and is now experiencing CHRIS.      Record Review:   - 3/12/2024 Dr. Yolie Garcia note re: 68 year old female with AMH, Crow, and pelvic pain. AMH and Crow - Recently had a negative AMH work up with cystoscopy with DR. Arita on 9/15/2022 and negative renal U/S imaging on 7/27/2022. Plan to continue annual UA to monitor and next AMH work up will be due again in 2027 or sooner if she experiences any gross hematuria episodes before that time. MPP - Continue HEP from PFPT.   - 3/14/2023 Urine culture: Normal urethral macario  - 3/14/2023 Dr. Yolie Garcia note Re: Crow and hematuria - Normal cystoscopy on 9/15/2022 and normal kidney U/S with no obstruction. Pelvic pain - Referred her to PFPT. Follow up in 1 year.   - She is a patient of Dr. Arita's and was seen on 9/15/2022. Hx of hematuria, kidney stones, and RLQ abdominal pain. She had a normal cystoscopy and normal prior bilateral kidney U/S with normal upper tract imaging. She was planning to f/u with Dr. Garcia in 6 months.   - She has a hx of pelvic pain and urinary frequency, for which she was referred to PFPT in July 2022. She declined a medication in July 2022.   - Bilateral kidney U/S on 7/27/2022 IMPRESSION:                                                1. No nephrolithiasis. No obstructive uropathy.                                                       2. Cholelithiasis demonstrated.     Pelvic Symptoms:  - She does endorse some MPP that is exacerbated when she spends a lot of time on her feet at work. However, she has continued to do her HEP from PPFT but has not done these exercises recently.      Urinary Symptoms:   - Denies any recent UTI within the past x1 year.  - No episodes of gross hematuria.   - Patient endorses more frequent urinary dribbling and has to wear a panty liner to manage this.   - Urinary dribbling is described as more urgency > stress describing key  in the door incontinence.   - GREYSON occurs with coughing, laughing, and sneezing.   - She reports that taking apple cider vinegar has improved her bladder control and attempts to have 1-2 tbsp of ACV in water to drink daily.   - UI occurs 2-3x during the day.   - UUI > GREYSON  - Nocturia 2-3x per night and sometimes gets up during the night due to other factors/insomnia instead of the urge to void.   - Denies flank pain, fevers, chills, nausea, or vomiting.     Family History:  - Negative for renal cancer.       Past Medical History:     Medical History[1]       Past Surgical History:     Surgical History[2]      Medications:     Prior to Admission medications    Medication Sig Start Date End Date Taking? Authorizing Provider   albuterol 2.5 mg /3 mL (0.083 %) nebulizer solution Take 3 mL (2.5 mg) by nebulization 4 times a day. USE 1 UNIT DOSE EVERY 4-6 HOURS AS NEEDED FOR WHEEZING . 11/25/24   Jennifer Ellis MD   albuterol 90 mcg/actuation inhaler Inhale 2 puffs every 4 hours if needed for wheezing or shortness of breath. 11/25/24   Jennifer Ellis MD   atorvastatin (Lipitor) 80 mg tablet Take 1 tablet (80 mg) by mouth once daily. 9/23/24 9/23/25  Jaiden Baker MD   budesonide-formoteroL (Symbicort) 160-4.5 mcg/actuation inhaler Inhale 2 puffs 2 times a day. RINSE MOUTH AFTER USE. 11/25/24   eJnnifer Ellis MD   clindamycin (Cleocin T) 1 % external solution Apply topically 2 times a day. apply to affected area 11/26/24 7/24/25  Jennifer Ellis MD   cyclobenzaprine (Flexeril) 10 mg tablet Take 1 tablet (10 mg) by mouth as needed at bedtime for muscle spasms. 6/20/24 8/19/24  Jennifer Ellis MD   fluticasone (Cutivate) 0.05 % cream Apply 1 Application topically 2 times a day. Apply and gentaly massage into affected area(s) twice daily 6/20/24   Jennifer Ellis MD   fluticasone (Flonase Sensimist) 27.5 mcg/actuation nasal spray Administer 1 spray into each nostril once daily. 3/5/25 3/5/26  Catalina Ellis MD   fluticasone (Flonase) 50  "mcg/actuation nasal spray Administer 1 spray into each nostril once daily. 3/5/25   Catalina Ellis MD   levocetirizine (Xyzal) 5 mg tablet Take 1 tablet (5 mg) by mouth once daily. 11/25/24   Jennifer Ellis MD   levothyroxine (Synthroid, Levoxyl) 125 mcg tablet Take 1 tablet (125 mcg) by mouth once daily. 11/25/24 11/25/25  Jennifer Ellis MD   loratadine (Claritin) 10 mg tablet Take 1 tablet (10 mg) by mouth once daily. 3/5/25 6/3/25  Catalina Ellis MD   nabumetone (Relafen) 750 mg tablet Take 1 tablet (750 mg) by mouth 2 times a day. 6/20/24 6/20/25  Jennifer Ellis MD   olmesartan (Benicar) 40 mg tablet Take 1 tablet (40 mg) by mouth once daily. 9/23/24 9/23/25  Jaiden Baker MD   pantoprazole (ProtoNix) 40 mg EC tablet Take 1 tablet (40 mg) by mouth once daily in the morning. Take before meals. Do not crush, chew, or split. 11/25/24 5/24/25  Jennifer Ellis MD        ROS  Review of Systems   Constitutional: Negative.    HENT: Negative.     Eyes: Negative.    Respiratory: Negative.     Cardiovascular: Negative.    Gastrointestinal: Negative.    Endocrine: Negative.    Genitourinary:  Positive for enuresis, pelvic pain and urgency.   Musculoskeletal: Negative.    Neurological: Negative.    Psychiatric/Behavioral: Negative.            PHYSICAL EXAM:    /60   Wt 78.9 kg (174 lb)   BMI 32.88 kg/m²   No LMP recorded. Patient is postmenopausal.    Declines chaperone for physical exam.      Well developed, well nourished, in no apparent distress.   Neurologic/Psychiatric:  Awake, Alert and Oriented times 3.  Affect normal.           Data and DIAGNOSTIC STUDIES REVIEWED   Imaging  No results found.    Cardiology, Vascular, and Other Imaging  No other imaging results found for the past 7 days     No results found for: \"URINECULTURE\", \"UAMICCOMM\"   Lab Results   Component Value Date    GLUCOSE 101 (H) 01/30/2025    CALCIUM 9.6 01/30/2025     01/30/2025    K 3.9 01/30/2025    CO2 24 01/30/2025     01/30/2025 "    BUN 18 01/30/2025    CREATININE 0.69 01/30/2025     Lab Results   Component Value Date    WBC 3.6 (L) 01/30/2025    HGB 13.9 01/30/2025    HCT 43.2 01/30/2025    MCV 88.0 01/30/2025     01/30/2025             [1]   Past Medical History:  Diagnosis Date    Chest pain 03/01/2023    Dizziness 03/01/2023    Elevated CPK 03/01/2023    Hyperlipidemia 03/01/2023    Hypertension 03/01/2023    Pain in right hip 12/02/2021    Hip pain, bilateral    Personal history of other diseases of the respiratory system     Personal history of asthma    Personal history of other endocrine, nutritional and metabolic disease     History of hyperlipidemia    Personal history of other specified conditions 03/30/2017    History of snoring    Personal history of urinary (tract) infections 01/18/2022    History of urinary tract infection    Pure hypercholesterolemia 03/01/2023    SOB (shortness of breath) on exertion 03/01/2023    Thyroid nodule 03/01/2023   [2]   Past Surgical History:  Procedure Laterality Date    CARPAL TUNNEL RELEASE Left 2003    COLONOSCOPY  2020    DILATION AND CURETTAGE OF UTERUS  07/19/2022    TONSILLECTOMY

## 2025-04-22 NOTE — PATIENT INSTRUCTIONS
We discussed lifestyle changes includin) Aiming to drink around 60 oz total of fluids per day   2) Avoiding bladder irritants such as caffeine, nicotine, artificial sweeteners   3) Stop drinking fluids 3 hours before bedtime   4) Timed voids every 2-3 hours.

## 2025-05-03 ENCOUNTER — OFFICE VISIT (OUTPATIENT)
Dept: URGENT CARE | Age: 70
End: 2025-05-03
Payer: COMMERCIAL

## 2025-05-03 VITALS
SYSTOLIC BLOOD PRESSURE: 109 MMHG | WEIGHT: 170 LBS | OXYGEN SATURATION: 98 % | HEIGHT: 61 IN | DIASTOLIC BLOOD PRESSURE: 76 MMHG | RESPIRATION RATE: 18 BRPM | TEMPERATURE: 98.2 F | HEART RATE: 92 BPM | BODY MASS INDEX: 32.1 KG/M2

## 2025-05-03 DIAGNOSIS — J01.10 ACUTE NON-RECURRENT FRONTAL SINUSITIS: Primary | ICD-10-CM

## 2025-05-03 RX ORDER — AMOXICILLIN AND CLAVULANATE POTASSIUM 875; 125 MG/1; MG/1
875 TABLET, FILM COATED ORAL 2 TIMES DAILY
Qty: 20 TABLET | Refills: 0 | Status: SHIPPED | OUTPATIENT
Start: 2025-05-03

## 2025-05-03 ASSESSMENT — ENCOUNTER SYMPTOMS
HEADACHES: 1
SINUS COMPLAINT: 1

## 2025-05-03 NOTE — PROGRESS NOTES
"Subjective   Patient ID: Ade Fisher is a 69 y.o. female. They present today with a chief complaint of Sinus Problem and Headache (Pt c/o sinus pressure, lots of drainage, headache, off and on chills x 1+ week.  Did take sinus advil this morning, seems to be helping.).    History of Present Illness  Does report she has chronic sinus fullness  Has tried OTC sinus medications with some relief        Sinus Problem  Associated symptoms: headaches    Headache      Past Medical History  Allergies as of 05/03/2025 - Reviewed 05/03/2025   Allergen Reaction Noted    Sulfa (sulfonamide antibiotics) Unknown 03/01/2023       Prescriptions Prior to Admission[1]     Medical History[2]    Surgical History[3]     reports that she has never smoked. She has never been exposed to tobacco smoke. She has never used smokeless tobacco. She reports that she does not drink alcohol and does not use drugs.    Review of Systems  Review of Systems   Neurological:  Positive for headaches.   All other systems reviewed and are negative.                                 Objective    Vitals:    05/03/25 1113   BP: 109/76   BP Location: Left arm   Patient Position: Sitting   BP Cuff Size: Adult   Pulse: 92   Resp: 18   Temp: 36.8 °C (98.2 °F)   TempSrc: Oral   SpO2: 98%   Weight: 77.1 kg (170 lb)   Height: 1.549 m (5' 1\")     No LMP recorded. Patient is postmenopausal.    Physical Exam  Vitals reviewed.   Constitutional:       Appearance: Normal appearance.   HENT:      Right Ear: Tympanic membrane, ear canal and external ear normal.      Left Ear: Tympanic membrane, ear canal and external ear normal.      Nose:      Right Sinus: Frontal sinus tenderness present.      Left Sinus: Frontal sinus tenderness present.      Mouth/Throat:      Mouth: Mucous membranes are moist.   Cardiovascular:      Rate and Rhythm: Normal rate and regular rhythm.      Pulses: Normal pulses.      Heart sounds: Normal heart sounds.   Pulmonary:      Effort: Pulmonary " effort is normal.      Breath sounds: Normal breath sounds.   Neurological:      Mental Status: She is alert.         Procedures    Point of Care Test & Imaging Results from this visit  No results found for this visit on 05/03/25.   Imaging  No results found.    Cardiology, Vascular, and Other Imaging  No other imaging results found for the past 2 days      Diagnostic study results (if any) were reviewed by JOEL Woodward.    Assessment/Plan   Allergies, medications, history, and pertinent labs/EKGs/Imaging reviewed by JOEL Woodward.     Medical Decision Making  Start Augmentin  Please followup with ENT for persistent or worsening symptoms    Orders and Diagnoses  Encounter Diagnosis   Name Primary?    Acute non-recurrent frontal sinusitis Yes         Medical Admin Record      Patient disposition: Home    Electronically signed by JOEL Woodward  11:18 AM           [1] (Not in a hospital admission)  [2]   Past Medical History:  Diagnosis Date    Chest pain 03/01/2023    Dizziness 03/01/2023    Elevated CPK 03/01/2023    Hyperlipidemia 03/01/2023    Hypertension 03/01/2023    Pain in right hip 12/02/2021    Hip pain, bilateral    Personal history of other diseases of the respiratory system     Personal history of asthma    Personal history of other endocrine, nutritional and metabolic disease     History of hyperlipidemia    Personal history of other specified conditions 03/30/2017    History of snoring    Personal history of urinary (tract) infections 01/18/2022    History of urinary tract infection    Pure hypercholesterolemia 03/01/2023    SOB (shortness of breath) on exertion 03/01/2023    Thyroid nodule 03/01/2023   [3]   Past Surgical History:  Procedure Laterality Date    CARPAL TUNNEL RELEASE Left 2003    COLONOSCOPY  2020    DILATION AND CURETTAGE OF UTERUS  07/19/2022    TONSILLECTOMY

## 2025-05-19 ENCOUNTER — TELEPHONE (OUTPATIENT)
Dept: CARDIOLOGY | Facility: CLINIC | Age: 70
End: 2025-05-19
Payer: COMMERCIAL

## 2025-05-19 NOTE — TELEPHONE ENCOUNTER
Left vm informing patient that her appointment with Dr. Baker on 5/28/25 was rescheduled to 6/20/25 at 2:15 pm due to a provider schedule change. I also sent a [employer] message.

## 2025-05-28 ENCOUNTER — APPOINTMENT (OUTPATIENT)
Age: 70
End: 2025-05-28
Payer: COMMERCIAL

## 2025-06-20 ENCOUNTER — APPOINTMENT (OUTPATIENT)
Dept: CARDIOLOGY | Facility: CLINIC | Age: 70
End: 2025-06-20
Payer: COMMERCIAL

## 2025-06-27 ENCOUNTER — OFFICE VISIT (OUTPATIENT)
Dept: PRIMARY CARE | Facility: CLINIC | Age: 70
End: 2025-06-27
Payer: COMMERCIAL

## 2025-06-27 VITALS
HEIGHT: 61 IN | WEIGHT: 172 LBS | SYSTOLIC BLOOD PRESSURE: 124 MMHG | BODY MASS INDEX: 32.47 KG/M2 | DIASTOLIC BLOOD PRESSURE: 62 MMHG

## 2025-06-27 DIAGNOSIS — G47.33 OBSTRUCTIVE SLEEP APNEA SYNDROME: ICD-10-CM

## 2025-06-27 DIAGNOSIS — E03.8 OTHER SPECIFIED HYPOTHYROIDISM: ICD-10-CM

## 2025-06-27 DIAGNOSIS — E78.2 MIXED HYPERLIPIDEMIA: ICD-10-CM

## 2025-06-27 DIAGNOSIS — E66.811 CLASS 1 OBESITY DUE TO EXCESS CALORIES WITHOUT SERIOUS COMORBIDITY WITH BODY MASS INDEX (BMI) OF 30.0 TO 30.9 IN ADULT: ICD-10-CM

## 2025-06-27 DIAGNOSIS — E66.09 CLASS 1 OBESITY DUE TO EXCESS CALORIES WITHOUT SERIOUS COMORBIDITY WITH BODY MASS INDEX (BMI) OF 30.0 TO 30.9 IN ADULT: ICD-10-CM

## 2025-06-27 DIAGNOSIS — I10 PRIMARY HYPERTENSION: ICD-10-CM

## 2025-06-27 DIAGNOSIS — K21.9 GASTROESOPHAGEAL REFLUX DISEASE WITHOUT ESOPHAGITIS: ICD-10-CM

## 2025-06-27 DIAGNOSIS — J30.1 ALLERGIC RHINITIS DUE TO POLLEN, UNSPECIFIED SEASONALITY: ICD-10-CM

## 2025-06-27 DIAGNOSIS — E78.00 PURE HYPERCHOLESTEROLEMIA: ICD-10-CM

## 2025-06-27 DIAGNOSIS — R05.9 COUGH, UNSPECIFIED TYPE: ICD-10-CM

## 2025-06-27 PROCEDURE — 3074F SYST BP LT 130 MM HG: CPT | Performed by: INTERNAL MEDICINE

## 2025-06-27 PROCEDURE — 3078F DIAST BP <80 MM HG: CPT | Performed by: INTERNAL MEDICINE

## 2025-06-27 PROCEDURE — 3008F BODY MASS INDEX DOCD: CPT | Performed by: INTERNAL MEDICINE

## 2025-06-27 PROCEDURE — 1159F MED LIST DOCD IN RCRD: CPT | Performed by: INTERNAL MEDICINE

## 2025-06-27 PROCEDURE — 99214 OFFICE O/P EST MOD 30 MIN: CPT | Performed by: INTERNAL MEDICINE

## 2025-06-27 RX ORDER — BUDESONIDE AND FORMOTEROL FUMARATE DIHYDRATE 160; 4.5 UG/1; UG/1
2 AEROSOL RESPIRATORY (INHALATION)
Qty: 10.2 G | Refills: 1 | Status: SHIPPED | OUTPATIENT
Start: 2025-06-27

## 2025-06-27 RX ORDER — FLUTICASONE PROPIONATE 50 MCG
1 SPRAY, SUSPENSION (ML) NASAL DAILY
Qty: 16 G | Refills: 1 | Status: SHIPPED | OUTPATIENT
Start: 2025-06-27

## 2025-06-27 RX ORDER — LEVOCETIRIZINE DIHYDROCHLORIDE 5 MG/1
5 TABLET, FILM COATED ORAL DAILY
Qty: 90 TABLET | Refills: 1 | Status: SHIPPED | OUTPATIENT
Start: 2025-06-27

## 2025-06-27 RX ORDER — PANTOPRAZOLE SODIUM 40 MG/1
40 TABLET, DELAYED RELEASE ORAL
Qty: 90 TABLET | Refills: 1 | Status: SHIPPED | OUTPATIENT
Start: 2025-06-27 | End: 2025-12-24

## 2025-06-27 RX ORDER — OLMESARTAN MEDOXOMIL 40 MG/1
40 TABLET ORAL DAILY
Qty: 90 TABLET | Refills: 1 | Status: SHIPPED | OUTPATIENT
Start: 2025-06-27 | End: 2026-06-27

## 2025-06-27 RX ORDER — LEVOTHYROXINE SODIUM 125 UG/1
125 TABLET ORAL DAILY
Qty: 90 TABLET | Refills: 1 | Status: SHIPPED | OUTPATIENT
Start: 2025-06-27 | End: 2026-06-27

## 2025-06-27 RX ORDER — ALBUTEROL SULFATE 90 UG/1
2 INHALANT RESPIRATORY (INHALATION) EVERY 4 HOURS PRN
Qty: 18 G | Refills: 1 | Status: SHIPPED | OUTPATIENT
Start: 2025-06-27

## 2025-06-28 NOTE — PROGRESS NOTES
Subjective   Patient ID: dAe Fisher is a 69 y.o. female who presents for Med Refill.    Rash       Patient is here for follow-up on hypertension high cholesterol hypothyroidism  Overall patient is doing well  The medication blood work  Needs medication refill    Past recap  Patient is here for follow-up  Follow-up on hypertension, hypothyroidism, high cholesterol  Complaining of rash under the chin area and around the lips for the past 1 month  Due for blood work  Needs medication refill  Otherwise doing well     Past recap  patient is here for follow-up on hypertension hypothyroidism, high cholesterol  Complaining of neck pain.  She was painting a week ago now her neck hurts right upper arm hurts having difficulty sleeping    Patient is here with complaints of having sinus congestion.  She finished her antibiotic last week but she is getting sinus headaches again having postnasal drainage feeling headaches     patient is here for ER follow-up.  She has COVID and she was drinking a lot of fluid became very weak.  In the hospital she was found to have low sodium.  She was treated for COVID hyponatremia hypertension sinusitis.  She ended up in the ER again because she got constipated with abdominal cramping.  In the hospital given GI cocktail and her symptoms resolved.  She is feeling much better now      patient is here for follow-up hypertension high cholesterol hypothyroidism  Not taking statin  Follow-up on hypothyroidism  Did blood work  Over the weekend having fleeting chest pains a lot of stress not sleeping heart was beating weird        Follow-up on hypertension high cholesterol hypothyroidism  Did blood work  She has to go off the statins because it gave her severe muscle pain,  Wants refill on allergy medications  Needs refill on eczema cream  Her asthma is flaring up as she moved to new house and lot of work of remodeling going on refill of nebulizer solution  She feels her ears stuffy      Patient  "presents with complaints of urinary symptoms having some frequency dark urine and back pain over the weekend  She had colonoscopy done couple of months ago by Dr. Chris diagnosed with diverticular disease     She did not do sleep study     Follow-up on hypertension high cholesterol hypothyroidism  Needs medication refill  Did blood work  She feels very tired because she does not get enough sleep  She does snore but does not think she has sleep apnea did not do sleep study  She is off the statins could not tolerate the side effects  She was on vacation and was not watching her diet     Nonsmoker nondrinker  Family history mother  of lung cancer father  of colon cancer at 85 grandmother had stroke  Review of Systems   Skin:  Positive for rash.       Objective   /62   Ht (!) 1.549 m (5' 1\")   Wt 78 kg (172 lb)   BMI 32.50 kg/m²     Physical Exam  Vitals reviewed.   Constitutional:       Appearance: Normal appearance.   HENT:      Head: Normocephalic and atraumatic.      Right Ear: Tympanic membrane, ear canal and external ear normal.      Left Ear: Tympanic membrane, ear canal and external ear normal.      Nose: Nose normal.      Mouth/Throat:      Pharynx: Oropharynx is clear.   Eyes:      Extraocular Movements: Extraocular movements intact.      Conjunctiva/sclera: Conjunctivae normal.      Pupils: Pupils are equal, round, and reactive to light.   Cardiovascular:      Rate and Rhythm: Normal rate and regular rhythm.      Pulses: Normal pulses.      Heart sounds: Normal heart sounds.   Pulmonary:      Effort: Pulmonary effort is normal.      Breath sounds: Normal breath sounds.   Abdominal:      General: Abdomen is flat. Bowel sounds are normal.      Palpations: Abdomen is soft.   Musculoskeletal:         General: Tenderness present.      Cervical back: Normal range of motion and neck supple.      Comments: Tenderness in neck muscles and upper Shoulder muscles   Skin:     General: Skin is warm and " dry.      Findings: Rash present.   Neurological:      General: No focal deficit present.      Mental Status: She is alert and oriented to person, place, and time.   Psychiatric:         Mood and Affect: Mood normal.         Assessment/Plan   Problem List Items Addressed This Visit           ICD-10-CM       Cardiac and Vasculature    Hyperlipidemia E78.5    Relevant Orders    Lipid Panel    Hypertension I10    Relevant Medications    olmesartan (Benicar) 40 mg tablet    Other Relevant Orders    CBC    Comprehensive Metabolic Panel    Pure hypercholesterolemia E78.00       ENT    Allergic rhinitis J30.9    Relevant Medications    levocetirizine (Xyzal) 5 mg tablet    fluticasone (Flonase) 50 mcg/actuation nasal spray       Endocrine/Metabolic    Hypothyroidism E03.9    Relevant Medications    levothyroxine (Synthroid, Levoxyl) 125 mcg tablet    Other Relevant Orders    Thyroid Stimulating Hormone    Class 1 obesity with body mass index (BMI) of 30.0 to 30.9 in adult E66.811, Z68.30       Pulmonary and Pneumonias    Cough R05.9    Relevant Medications    budesonide-formoterol (Symbicort) 160-4.5 mcg/actuation inhaler    albuterol 90 mcg/actuation inhaler       Sleep    Obstructive sleep apnea syndrome G47.33     Other Visit Diagnoses         Codes      Gastroesophageal reflux disease without esophagitis     K21.9    Relevant Medications    pantoprazole (ProtoNix) 40 mg EC tablet          Past recap  Blood pressure is stable  Will decrease levothyroxine 100 mcg  Ultrasound thyroid for the thyroid nodule yearly follow-up  Cholesterol is extremely high but patient does not want to take medications  She understands risks  She is cannot do it with diet and exercise  For hematuria she saw the urologist cystoscopy normal  Patient thinks that years she was going through a lot of stress and anxiety could have been contributing to a lot of her symptoms  Follow-up blood work in 3 months     3/6/23  Patient tympanic membrane  clear  She has some tenderness in the TMJ  Flonase nasal spray to help with the eustachian tube dysfunction  Blood work reviewed  TSH little elevated I think patient was missing few dosages which she accepted  We will continue levothyroxine 100 mcg  Patient is again refusing to take statins for high cholesterol  We will do CT cardiac scoring to assess the risk  Follow-up blood work in 6 months        6/23/2023  I am currently not convinced if patient has sinus infection  I think patient is having rhinitis and allergies  Advised to use Flonase twice a day  Use Claritin-D  If not better then only try antibiotic  Also offered patient to do CAT scan of the sinuses but she wants to wait     8/15/2023     Patient has mild eustachian tube dysfunction on the right side  Sinuses passages clean  I am wondering if patient has acid reflux causing chest congestion  Treat with Protonix Z-Jez and Medrol Dosepak  We will do CT of the sinuses for recurrent sinusitis  Keep her follow-up with the ENT     11/13/2023  Blood work reviewed  TSH elevated at 8.64  Increase levothyroxine 125 mcg  Cholesterol is high   Patient is intolerant to statins and does not want to take it  She will do with diet and exercise  Cholesterol diet chart given  LFT still elevated  Again emphasized low-carb diet  Follow-up blood work in 3 months     1/11/2024  EKG done shows normal sinus rhythm  Troponin ordered  Blood work reviewed all in normal range  Continue current medications  Advised to go to the emergency room if chest pain happens again  Stress test done in 2019 was normal  Patient has not done CT cardiac scoring advised to do  Discussed diet exercise and lifestyle modification to    2/13/2024  Hospital records reviewed ER records reviewed  Advised patient to cut down nonessential liquids and diet.  Cut down soups  Cut down soft drinks  BMP q. weekly for 4 times  Routine follow-up    2/26/2024  Clinically patient does not look like she has any  sinus congestion  Will get x-ray sinuses to see if she needs antibiotics or if that headache giving her symptoms  Augmentin pending the x-ray results  Addendum x-ray of sinuses does not show any infection  Advised patient not to take medication  Treat headache with anti-inflammatory    6/18/2024  Neck pain most likely from neck spasm  Treat with Relafen and Flexeril  Heat stretching exercises ice  Blood pressure stable  Blood work ordered  Medications refilled    11/25/2024  Patient has perioral dermatitis  Pathophysiology discussed  Doxycycline 100 mg twice a day  Clindamycin cream topically  Blood work ordered  Blood pressure stable    6/27/2025  Last blood work patient had elevated cholesterol  Patient with slightly elevated blood sugar  Blood work ordered  Blood work ordered for thyroid cholesterol  Asthma under control refills given on medications  Follow-up in 6 months if blood work is normal

## 2025-06-30 ENCOUNTER — APPOINTMENT (OUTPATIENT)
Dept: PRIMARY CARE | Facility: CLINIC | Age: 70
End: 2025-06-30
Payer: COMMERCIAL

## 2025-07-03 LAB
ALBUMIN SERPL-MCNC: 4.3 G/DL (ref 3.6–5.1)
ALP SERPL-CCNC: 92 U/L (ref 37–153)
ALT SERPL-CCNC: 15 U/L (ref 6–29)
ANION GAP SERPL CALCULATED.4IONS-SCNC: 8 MMOL/L (CALC) (ref 7–17)
AST SERPL-CCNC: 15 U/L (ref 10–35)
BILIRUB SERPL-MCNC: 0.9 MG/DL (ref 0.2–1.2)
BUN SERPL-MCNC: 15 MG/DL (ref 7–25)
CALCIUM SERPL-MCNC: 9.9 MG/DL (ref 8.6–10.4)
CHLORIDE SERPL-SCNC: 102 MMOL/L (ref 98–110)
CHOLEST SERPL-MCNC: 221 MG/DL
CHOLEST/HDLC SERPL: 4.2 (CALC)
CO2 SERPL-SCNC: 27 MMOL/L (ref 20–32)
CREAT SERPL-MCNC: 0.59 MG/DL (ref 0.5–1.05)
EGFRCR SERPLBLD CKD-EPI 2021: 97 ML/MIN/1.73M2
ERYTHROCYTE [DISTWIDTH] IN BLOOD BY AUTOMATED COUNT: 13.6 % (ref 11–15)
GLUCOSE SERPL-MCNC: 95 MG/DL (ref 65–99)
HCT VFR BLD AUTO: 40.4 % (ref 35–45)
HDLC SERPL-MCNC: 53 MG/DL
HGB BLD-MCNC: 13 G/DL (ref 11.7–15.5)
LDLC SERPL CALC-MCNC: 143 MG/DL (CALC)
MCH RBC QN AUTO: 28.7 PG (ref 27–33)
MCHC RBC AUTO-ENTMCNC: 32.2 G/DL (ref 32–36)
MCV RBC AUTO: 89.2 FL (ref 80–100)
NONHDLC SERPL-MCNC: 168 MG/DL (CALC)
PLATELET # BLD AUTO: 326 THOUSAND/UL (ref 140–400)
PMV BLD REES-ECKER: 9.2 FL (ref 7.5–12.5)
POTASSIUM SERPL-SCNC: 4.5 MMOL/L (ref 3.5–5.3)
PROT SERPL-MCNC: 7.2 G/DL (ref 6.1–8.1)
RBC # BLD AUTO: 4.53 MILLION/UL (ref 3.8–5.1)
SODIUM SERPL-SCNC: 137 MMOL/L (ref 135–146)
TRIGL SERPL-MCNC: 126 MG/DL
TSH SERPL-ACNC: 0.34 MIU/L (ref 0.4–4.5)
WBC # BLD AUTO: 5.2 THOUSAND/UL (ref 3.8–10.8)

## 2025-07-09 ENCOUNTER — TELEPHONE (OUTPATIENT)
Dept: PRIMARY CARE | Facility: CLINIC | Age: 70
End: 2025-07-09
Payer: COMMERCIAL

## 2025-07-09 DIAGNOSIS — E03.9 HYPOTHYROIDISM, UNSPECIFIED TYPE: ICD-10-CM

## 2025-07-09 RX ORDER — LEVOTHYROXINE SODIUM 112 UG/1
112 TABLET ORAL DAILY
Qty: 90 TABLET | Refills: 0 | Status: SHIPPED | OUTPATIENT
Start: 2025-07-09

## 2025-08-06 ENCOUNTER — OFFICE VISIT (OUTPATIENT)
Dept: URGENT CARE | Age: 70
End: 2025-08-06
Payer: COMMERCIAL

## 2025-08-06 VITALS
TEMPERATURE: 97.7 F | DIASTOLIC BLOOD PRESSURE: 81 MMHG | HEART RATE: 82 BPM | BODY MASS INDEX: 32.47 KG/M2 | RESPIRATION RATE: 16 BRPM | HEIGHT: 61 IN | WEIGHT: 172 LBS | SYSTOLIC BLOOD PRESSURE: 135 MMHG | OXYGEN SATURATION: 99 %

## 2025-08-06 DIAGNOSIS — J01.10 ACUTE NON-RECURRENT FRONTAL SINUSITIS: Primary | ICD-10-CM

## 2025-08-06 PROCEDURE — 99213 OFFICE O/P EST LOW 20 MIN: CPT

## 2025-08-06 PROCEDURE — 3075F SYST BP GE 130 - 139MM HG: CPT

## 2025-08-06 PROCEDURE — 1036F TOBACCO NON-USER: CPT

## 2025-08-06 PROCEDURE — 3008F BODY MASS INDEX DOCD: CPT

## 2025-08-06 PROCEDURE — 1159F MED LIST DOCD IN RCRD: CPT

## 2025-08-06 PROCEDURE — 3079F DIAST BP 80-89 MM HG: CPT

## 2025-08-06 PROCEDURE — 1126F AMNT PAIN NOTED NONE PRSNT: CPT

## 2025-08-06 RX ORDER — AMOXICILLIN AND CLAVULANATE POTASSIUM 875; 125 MG/1; MG/1
875 TABLET, FILM COATED ORAL 2 TIMES DAILY
Qty: 14 TABLET | Refills: 0 | Status: SHIPPED | OUTPATIENT
Start: 2025-08-06 | End: 2025-08-13

## 2025-08-06 ASSESSMENT — PAIN SCALES - GENERAL: PAINLEVEL_OUTOF10: 0-NO PAIN

## 2025-08-06 NOTE — PROGRESS NOTES
"Subjective   Patient ID: Ade Fisher is a 69 y.o. female. They present today with a chief complaint of Headache and Sinus Problem (Sinus drainage with bloody discharge for 1 week but worse the last few days).    History of Present Illness  HPI  Patient presents with complaints of sinus headache, sinus pressure, congestion for 1 week.  Patient states that when this started she was having other viral upper respiratory symptoms such as ear pain and sore throat.  She states that this resolved but over the last 3 days her sinus congestion, pressure and fever has started up.  Patient denies nausea, vomiting, chest pain, shortness of breath, abdominal pain.  She states she is prone to sinus infections.  Past Medical History  Allergies as of 08/06/2025 - Reviewed 08/06/2025   Allergen Reaction Noted    Sulfa (sulfonamide antibiotics) Unknown 03/01/2023       Prescriptions Prior to Admission[1]     Medical History[2]    Surgical History[3]     reports that she has never smoked. She has never been exposed to tobacco smoke. She has never used smokeless tobacco. She reports that she does not drink alcohol and does not use drugs.    Review of Systems  Review of Systems  ROS is negative unless otherwise stated in HPI.                                Objective    Vitals:    08/06/25 1351   BP: 135/81   BP Location: Left arm   Patient Position: Sitting   Pulse: 82   Resp: 16   Temp: 36.5 °C (97.7 °F)   TempSrc: Oral   SpO2: 99%   Weight: 78 kg (172 lb)   Height: (!) 1.549 m (5' 1\")     No LMP recorded. Patient is postmenopausal.    Physical Exam  VS: As documented in the triage note and EMR flowsheet from this visit was reviewed  General: Well appearing. No acute distress.   Eyes:  Extraocular movements grossly intact. No scleral icterus.   Head: Atraumatic. Normocephalic.     Neck: No meningismus. No gross masses. Full movement through range of motion  ENT: Posterior oropharynx shows no erythema, exudate or edema.  Uvula is " midline without edema.  No stridor or trismus. No cervical lymphadenopathy.  Mild frontal sinus tenderness. No mastoid tenderness.  Bilateral tympanic membranes are intact with slight bulging and no erythema.  Bilateral external auditory canals are clear without exudate or edema.  Nasal turbinates are slightly inflamed with clear nasal discharge present.  No signs of obstruction.  MSK: Symmetric muscle bulk. No gross step offs or deformities.  Skin: Warm, dry. No rashes  Neuro: CN II-VII intact. A&O x3. Speech fluent. Alert. Moving all extremities. Ambulates with normal gait  Psych: Appropriate mood and affect for situation  Procedures    Point of Care Test & Imaging Results from this visit  No results found for this visit on 08/06/25.   Imaging  No results found.    Cardiology, Vascular, and Other Imaging  No other imaging results found for the past 2 days      Diagnostic study results (if any) were reviewed by Vidya Suazo PA-C.    Assessment/Plan   Allergies, medications, history, and pertinent labs/EKGs/Imaging reviewed by Vidya Suazo PA-C.     Medical Decision Making  The patient was evaluated for above complaints in [facility]. The patient has acute bacterial frontal sinusitis. Patient educated on treatment plan consisting of Augmentin x 7 days. Patient provided with return precaution and can follow up with PCP if no improvement.       Orders and Diagnoses  Diagnoses and all orders for this visit:  Acute non-recurrent frontal sinusitis  -     amoxicillin-clavulanate (Augmentin) 875-125 mg tablet; Take 1 tablet (875 mg of amoxicillin) by mouth 2 times a day for 7 days.      Medical Admin Record      Patient disposition: Home    Electronically signed by Vidya Suazo PA-C  2:09 PM           [1] (Not in a hospital admission)   [2]   Past Medical History:  Diagnosis Date    Chest pain 03/01/2023    Dizziness 03/01/2023    Elevated CPK 03/01/2023    Hyperlipidemia 03/01/2023    Hypertension 03/01/2023    Pain in  right hip 12/02/2021    Hip pain, bilateral    Personal history of other diseases of the respiratory system     Personal history of asthma    Personal history of other endocrine, nutritional and metabolic disease     History of hyperlipidemia    Personal history of other specified conditions 03/30/2017    History of snoring    Personal history of urinary (tract) infections 01/18/2022    History of urinary tract infection    Pure hypercholesterolemia 03/01/2023    SOB (shortness of breath) on exertion 03/01/2023    Thyroid nodule 03/01/2023   [3]   Past Surgical History:  Procedure Laterality Date    CARPAL TUNNEL RELEASE Left 2003    COLONOSCOPY  2020    DILATION AND CURETTAGE OF UTERUS  07/19/2022    TONSILLECTOMY

## 2025-08-12 ENCOUNTER — APPOINTMENT (OUTPATIENT)
Dept: OBSTETRICS AND GYNECOLOGY | Facility: CLINIC | Age: 70
End: 2025-08-12
Payer: COMMERCIAL

## 2025-08-19 ENCOUNTER — APPOINTMENT (OUTPATIENT)
Dept: OBSTETRICS AND GYNECOLOGY | Facility: CLINIC | Age: 70
End: 2025-08-19
Payer: COMMERCIAL

## 2025-08-19 VITALS — BODY MASS INDEX: 32.31 KG/M2 | WEIGHT: 171 LBS | SYSTOLIC BLOOD PRESSURE: 136 MMHG | DIASTOLIC BLOOD PRESSURE: 66 MMHG

## 2025-08-19 DIAGNOSIS — R31.21 ASYMPTOMATIC MICROSCOPIC HEMATURIA: ICD-10-CM

## 2025-08-19 DIAGNOSIS — M79.18 MYOFASCIAL PAIN: ICD-10-CM

## 2025-08-19 DIAGNOSIS — N39.0 RECURRENT UTI: ICD-10-CM

## 2025-08-19 DIAGNOSIS — N32.81 OAB (OVERACTIVE BLADDER): Primary | ICD-10-CM

## 2025-08-19 PROCEDURE — 3078F DIAST BP <80 MM HG: CPT | Performed by: OBSTETRICS & GYNECOLOGY

## 2025-08-19 PROCEDURE — 99214 OFFICE O/P EST MOD 30 MIN: CPT | Performed by: OBSTETRICS & GYNECOLOGY

## 2025-08-19 PROCEDURE — 1159F MED LIST DOCD IN RCRD: CPT | Performed by: OBSTETRICS & GYNECOLOGY

## 2025-08-19 PROCEDURE — 3075F SYST BP GE 130 - 139MM HG: CPT | Performed by: OBSTETRICS & GYNECOLOGY

## 2026-03-24 ENCOUNTER — APPOINTMENT (OUTPATIENT)
Dept: OBSTETRICS AND GYNECOLOGY | Facility: CLINIC | Age: 71
End: 2026-03-24
Payer: COMMERCIAL